# Patient Record
Sex: FEMALE | Race: WHITE | NOT HISPANIC OR LATINO | Employment: OTHER | URBAN - METROPOLITAN AREA
[De-identification: names, ages, dates, MRNs, and addresses within clinical notes are randomized per-mention and may not be internally consistent; named-entity substitution may affect disease eponyms.]

---

## 2017-11-27 RX ORDER — ATORVASTATIN CALCIUM 20 MG/1
TABLET, FILM COATED ORAL
Qty: 90 TABLET | Refills: 0 | Status: SHIPPED | OUTPATIENT
Start: 2017-11-27 | End: 2018-03-01 | Stop reason: SDUPTHER

## 2017-11-27 RX ORDER — METFORMIN HYDROCHLORIDE 500 MG/1
TABLET ORAL
Qty: 90 TABLET | Refills: 0 | Status: SHIPPED | OUTPATIENT
Start: 2017-11-27 | End: 2018-03-29 | Stop reason: SDUPTHER

## 2018-01-22 RX ORDER — ESCITALOPRAM OXALATE 20 MG/1
TABLET ORAL
Qty: 90 TABLET | Refills: 3 | Status: SHIPPED | OUTPATIENT
Start: 2018-01-22 | End: 2018-12-29 | Stop reason: SDUPTHER

## 2018-01-26 ENCOUNTER — OFFICE VISIT (OUTPATIENT)
Dept: PRIMARY CARE CLINIC | Facility: CLINIC | Age: 73
End: 2018-01-26
Payer: MEDICARE

## 2018-01-26 VITALS
BODY MASS INDEX: 27.42 KG/M2 | SYSTOLIC BLOOD PRESSURE: 122 MMHG | HEIGHT: 62 IN | WEIGHT: 149 LBS | RESPIRATION RATE: 16 BRPM | TEMPERATURE: 98 F | DIASTOLIC BLOOD PRESSURE: 71 MMHG | HEART RATE: 80 BPM | OXYGEN SATURATION: 98 %

## 2018-01-26 DIAGNOSIS — E78.5 HYPERLIPIDEMIA, UNSPECIFIED HYPERLIPIDEMIA TYPE: ICD-10-CM

## 2018-01-26 DIAGNOSIS — F41.9 ANXIETY: ICD-10-CM

## 2018-01-26 DIAGNOSIS — R41.3 MEMORY LOSS: ICD-10-CM

## 2018-01-26 DIAGNOSIS — R59.0 SUPRACLAVICULAR ADENOPATHY: Primary | ICD-10-CM

## 2018-01-26 DIAGNOSIS — E11.69 TYPE 2 DIABETES MELLITUS WITH HYPERLIPIDEMIA: ICD-10-CM

## 2018-01-26 DIAGNOSIS — E78.5 TYPE 2 DIABETES MELLITUS WITH HYPERLIPIDEMIA: ICD-10-CM

## 2018-01-26 PROCEDURE — 99214 OFFICE O/P EST MOD 30 MIN: CPT | Mod: S$GLB,,, | Performed by: FAMILY MEDICINE

## 2018-01-26 PROCEDURE — 99999 PR PBB SHADOW E&M-EST. PATIENT-LVL IV: CPT | Mod: PBBFAC,,, | Performed by: FAMILY MEDICINE

## 2018-01-26 NOTE — PROGRESS NOTES
"Subjective:       Patient ID: Melissa Sood is a 72 y.o. female.    Chief Complaint: Mass (Pt. c/o lump to right side of neck/chest x 2 days. Pt. denies pain and change in size. Mobile. No redness or fever noted. )    Discovered nontender lump to right side of neck 2 days ago. No recent illness. Has been generally feeling good. Last mammogram 9/27 in [place], normal.  Patient and family also concerned about progressively worsening memory loss. Has tried Aricept and Namzaric in the past, both of which she could not tolerate due to GI side effects. Saw neurologist in April 2014, felt to have no evidence of dementia at that time.      Review of Systems   Constitutional: Negative for chills, diaphoresis, fever and unexpected weight change.   HENT: Negative for sore throat and trouble swallowing.    Eyes: Negative for visual disturbance.   Respiratory: Negative for shortness of breath.    Cardiovascular: Negative for chest pain.   Gastrointestinal: Negative for diarrhea, nausea and vomiting.   Genitourinary: Negative for difficulty urinating.   Musculoskeletal: Negative for joint swelling.   Skin: Negative for rash.   Neurological: Negative for weakness.   Hematological: Does not bruise/bleed easily.   Psychiatric/Behavioral: Positive for sleep disturbance.       Objective:      Vitals:    01/26/18 1614   BP: 122/71   BP Location: Left arm   Patient Position: Sitting   BP Method: Medium (Automatic)   Pulse: 80   Resp: 16   Temp: 98 °F (36.7 °C)   TempSrc: Oral   SpO2: 98%   Weight: 67.6 kg (149 lb)   Height: 5' 2" (1.575 m)     Physical Exam   Constitutional: She is oriented to person, place, and time. She appears well-developed and well-nourished.   HENT:   Head: Normocephalic and atraumatic.   Neck: Neck supple. No JVD present.   Cardiovascular: Normal rate, regular rhythm and normal heart sounds.    Pulmonary/Chest: Effort normal and breath sounds normal.   Musculoskeletal: She exhibits no edema.   Lymphadenopathy:     "    Right: Supraclavicular (1cm, firm, mobile, nontender) adenopathy present.   Neurological: She is alert and oriented to person, place, and time.   Skin: Skin is warm and dry.   Psychiatric: She has a normal mood and affect. Her behavior is normal.   Nursing note and vitals reviewed.      Assessment:       1. Supraclavicular adenopathy    2. Type 2 diabetes mellitus with hyperlipidemia    3. Anxiety    4. Hyperlipidemia, unspecified hyperlipidemia type    5. Memory loss        Plan:       Supraclavicular adenopathy  -     CT Soft Tissue Neck With Contrast; Future; Expected date: 02/02/2018  -     CBC auto differential; Future; Expected date: 01/26/2018  -     TSH; Future; Expected date: 01/26/2018  -     T4, free; Future; Expected date: 01/26/2018  -     Sedimentation rate, manual; Future; Expected date: 01/26/2018  -     C-reactive protein; Future; Expected date: 01/26/2018    Type 2 diabetes mellitus with hyperlipidemia  Comments:  instructed needs to hold metformin for 24 before and 48 hours after CT scan with IV contrast  Orders:  -     Comprehensive metabolic panel; Future; Expected date: 01/26/2018  -     Hemoglobin A1c; Future; Expected date: 01/26/2018  -     Microalbumin/creatinine urine ratio; Future; Expected date: 01/26/2018  -     TSH; Future; Expected date: 01/26/2018  -     T4, free; Future; Expected date: 01/26/2018    Anxiety    Hyperlipidemia, unspecified hyperlipidemia type  -     Lipid panel; Future; Expected date: 01/26/2018    Memory loss  Comments:  I would like her to get a second opinion regarding her memory problems  Orders:  -     Ambulatory referral to Neurology      Medication List with Changes/Refills   Current Medications    ASPIRIN (ECOTRIN) 81 MG EC TABLET    Take 81 mg by mouth once daily.    ATORVASTATIN (LIPITOR) 20 MG TABLET    TAKE 1 TABLET BY MOUTH DAILY    ESCITALOPRAM OXALATE (LEXAPRO) 20 MG TABLET    TAKE 1 TABLET BY MOUTH EVERY DAY    METFORMIN (GLUCOPHAGE) 500 MG TABLET     TAKE 1 TABLET BY MOUTH DAILY WITH A MEAL

## 2018-01-30 ENCOUNTER — CLINICAL SUPPORT (OUTPATIENT)
Dept: PRIMARY CARE CLINIC | Facility: CLINIC | Age: 73
End: 2018-01-30
Payer: MEDICARE

## 2018-01-30 DIAGNOSIS — E78.5 TYPE 2 DIABETES MELLITUS WITH HYPERLIPIDEMIA: ICD-10-CM

## 2018-01-30 DIAGNOSIS — E78.5 HYPERLIPIDEMIA, UNSPECIFIED HYPERLIPIDEMIA TYPE: ICD-10-CM

## 2018-01-30 DIAGNOSIS — E11.69 TYPE 2 DIABETES MELLITUS WITH HYPERLIPIDEMIA: ICD-10-CM

## 2018-01-30 DIAGNOSIS — R59.0 SUPRACLAVICULAR ADENOPATHY: ICD-10-CM

## 2018-01-30 LAB
ALBUMIN SERPL BCP-MCNC: 3.6 G/DL
ALP SERPL-CCNC: 105 U/L
ALT SERPL W/O P-5'-P-CCNC: 12 U/L
ANION GAP SERPL CALC-SCNC: 8 MMOL/L
AST SERPL-CCNC: 24 U/L
BASOPHILS # BLD AUTO: 0.06 K/UL
BASOPHILS NFR BLD: 1.1 %
BILIRUB SERPL-MCNC: 0.7 MG/DL
BUN SERPL-MCNC: 14 MG/DL
CALCIUM SERPL-MCNC: 9.3 MG/DL
CHLORIDE SERPL-SCNC: 105 MMOL/L
CHOLEST SERPL-MCNC: 146 MG/DL
CHOLEST/HDLC SERPL: 2.9 {RATIO}
CO2 SERPL-SCNC: 27 MMOL/L
CREAT SERPL-MCNC: 0.8 MG/DL
CREAT UR-MCNC: 309 MG/DL
CRP SERPL-MCNC: 1.1 MG/L
DIFFERENTIAL METHOD: NORMAL
EOSINOPHIL # BLD AUTO: 0.2 K/UL
EOSINOPHIL NFR BLD: 3 %
ERYTHROCYTE [DISTWIDTH] IN BLOOD BY AUTOMATED COUNT: 13.1 %
ERYTHROCYTE [SEDIMENTATION RATE] IN BLOOD BY WESTERGREN METHOD: 28 MM/HR
EST. GFR  (AFRICAN AMERICAN): >60 ML/MIN/1.73 M^2
EST. GFR  (NON AFRICAN AMERICAN): >60 ML/MIN/1.73 M^2
ESTIMATED AVG GLUCOSE: 123 MG/DL
GLUCOSE SERPL-MCNC: 116 MG/DL
HBA1C MFR BLD HPLC: 5.9 %
HCT VFR BLD AUTO: 41.1 %
HDLC SERPL-MCNC: 51 MG/DL
HDLC SERPL: 34.9 %
HGB BLD-MCNC: 13.4 G/DL
IMM GRANULOCYTES # BLD AUTO: 0.01 K/UL
IMM GRANULOCYTES NFR BLD AUTO: 0.2 %
LDLC SERPL CALC-MCNC: 78.6 MG/DL
LYMPHOCYTES # BLD AUTO: 1.6 K/UL
LYMPHOCYTES NFR BLD: 28.4 %
MCH RBC QN AUTO: 30 PG
MCHC RBC AUTO-ENTMCNC: 32.6 G/DL
MCV RBC AUTO: 92 FL
MICROALBUMIN UR DL<=1MG/L-MCNC: 26 UG/ML
MICROALBUMIN/CREATININE RATIO: 8.4 UG/MG
MONOCYTES # BLD AUTO: 0.4 K/UL
MONOCYTES NFR BLD: 6.2 %
NEUTROPHILS # BLD AUTO: 3.5 K/UL
NEUTROPHILS NFR BLD: 61.1 %
NONHDLC SERPL-MCNC: 95 MG/DL
NRBC BLD-RTO: 0 /100 WBC
PLATELET # BLD AUTO: 236 K/UL
PMV BLD AUTO: 10 FL
POTASSIUM SERPL-SCNC: 5.2 MMOL/L
PROT SERPL-MCNC: 7.5 G/DL
RBC # BLD AUTO: 4.46 M/UL
SODIUM SERPL-SCNC: 140 MMOL/L
T4 FREE SERPL-MCNC: 1.16 NG/DL
TRIGL SERPL-MCNC: 82 MG/DL
TSH SERPL DL<=0.005 MIU/L-ACNC: 1.35 UIU/ML
WBC # BLD AUTO: 5.67 K/UL

## 2018-01-30 PROCEDURE — 84443 ASSAY THYROID STIM HORMONE: CPT

## 2018-01-30 PROCEDURE — 85651 RBC SED RATE NONAUTOMATED: CPT

## 2018-01-30 PROCEDURE — 99999 PR PBB SHADOW E&M-EST. PATIENT-LVL II: CPT | Mod: PBBFAC,,,

## 2018-01-30 PROCEDURE — 84439 ASSAY OF FREE THYROXINE: CPT

## 2018-01-30 PROCEDURE — 80053 COMPREHEN METABOLIC PANEL: CPT

## 2018-01-30 PROCEDURE — 82043 UR ALBUMIN QUANTITATIVE: CPT

## 2018-01-30 PROCEDURE — 83036 HEMOGLOBIN GLYCOSYLATED A1C: CPT

## 2018-01-30 PROCEDURE — 80061 LIPID PANEL: CPT

## 2018-01-30 PROCEDURE — 85025 COMPLETE CBC W/AUTO DIFF WBC: CPT

## 2018-01-30 PROCEDURE — 86140 C-REACTIVE PROTEIN: CPT

## 2018-02-06 ENCOUNTER — TELEPHONE (OUTPATIENT)
Dept: PRIMARY CARE CLINIC | Facility: CLINIC | Age: 73
End: 2018-02-06

## 2018-02-06 NOTE — TELEPHONE ENCOUNTER
Nurse called patient with results , labs normal but ct showed enlarged lymph nodes and we need to do another ct with contrast. RN instructed patient to hold metformin 1 day prior and two days after ct and to make appt with Dr Lopez 2-3 days after ct to discuss. Nurse further stated that [facility] will call tomorrow to set up date and time for ct. Patient acknowledged.

## 2018-02-06 NOTE — TELEPHONE ENCOUNTER
----- Message from Carla Bynum sent at 2/6/2018  4:41 PM CST -----  Contact: [phone] Britney Vazquez (Daughter)  [phone] Britney Vazquez (Daughter), requesting results

## 2018-02-07 ENCOUNTER — TELEPHONE (OUTPATIENT)
Dept: PRIMARY CARE CLINIC | Facility: CLINIC | Age: 73
End: 2018-02-07

## 2018-02-07 NOTE — TELEPHONE ENCOUNTER
----- Message from Natasha Roa sent at 2/7/2018  8:57 AM CST -----  Contact: Britney  Daughter called regarding the patient's ct scan and lab results. Please contact [phone]

## 2018-02-07 NOTE — TELEPHONE ENCOUNTER
Labs all look okay except minimally elevated sedimentation rate, which is a nonspecific marker of inflammation.  A1c 5.9%, excellent control diabetes.  Lipid profile looks great.  Continue current medications.

## 2018-02-09 ENCOUNTER — TELEPHONE (OUTPATIENT)
Dept: PRIMARY CARE CLINIC | Facility: CLINIC | Age: 73
End: 2018-02-09

## 2018-02-09 NOTE — TELEPHONE ENCOUNTER
----- Message from Carla Bynum sent at 2/9/2018 12:33 PM CST -----  Contact: [phone] Britney Vazquez (Daughter)  [phone] Britney Vazquez (Daughter), requesting results  Patient daughter is upset because nursing staff is calling the patient with the results when she left messages with her phone number to call her and not the patient, the patient have memory issues and don't understand the call, she don't understand why someone calling her, she don't understand results or anything.

## 2018-02-14 DIAGNOSIS — M89.9 LYTIC BONE LESIONS ON XRAY: Primary | ICD-10-CM

## 2018-02-14 DIAGNOSIS — C79.51 BONE METASTASES: ICD-10-CM

## 2018-02-14 DIAGNOSIS — D49.2 MUSCULOSKELETAL TUMOR: ICD-10-CM

## 2018-02-16 ENCOUNTER — TELEPHONE (OUTPATIENT)
Dept: PRIMARY CARE CLINIC | Facility: CLINIC | Age: 73
End: 2018-02-16

## 2018-02-16 NOTE — TELEPHONE ENCOUNTER
----- Message from Vicky Edge sent at 2/16/2018  8:37 AM CST -----  Contact: Daughter  Britney, daughter [phone], Calling to find out if her mother needs to see the Oncologist before she has the bone scan done. Please call her. Thanks.

## 2018-02-16 NOTE — TELEPHONE ENCOUNTER
Spoke with patient's daughter (maximilian), asking how long will it take to get the results back from the bone scan. Patient has it scheduled for Tuesday 2/20/18. Verbalized to daughter results should be back within a few days to make the F/U appointment with oncology for 1 week after scan. Daughter verbalized understanding.

## 2018-02-16 NOTE — TELEPHONE ENCOUNTER
Britney notified that the pt needs to do the bone scan before seeing the Oncologist. She states understanding but wants to talk to the nurse she spoke to last night

## 2018-02-28 ENCOUNTER — TELEPHONE (OUTPATIENT)
Dept: HEMATOLOGY/ONCOLOGY | Facility: CLINIC | Age: 73
End: 2018-02-28

## 2018-02-28 NOTE — TELEPHONE ENCOUNTER
----- Message from Kacie Medel MD sent at 2/27/2018  4:16 PM CST -----  Contact: Arcenio (Son) [phone]   Please get her in to see me   LM  And call son   ----- Message -----  From: RT Anjali  Sent: 2/23/2018   3:45 PM  To: Kacie Medel MD    Arcenio (Son) [phone], requesting an appt for the pt, thanks.

## 2018-03-01 RX ORDER — ATORVASTATIN CALCIUM 20 MG/1
TABLET, FILM COATED ORAL
Qty: 90 TABLET | Refills: 1 | Status: SHIPPED | OUTPATIENT
Start: 2018-03-01 | End: 2018-08-30 | Stop reason: SDUPTHER

## 2018-03-08 ENCOUNTER — TELEPHONE (OUTPATIENT)
Dept: PRIMARY CARE CLINIC | Facility: CLINIC | Age: 73
End: 2018-03-08

## 2018-03-08 DIAGNOSIS — F41.9 ANXIETY: Primary | ICD-10-CM

## 2018-03-08 RX ORDER — ALPRAZOLAM 0.25 MG/1
0.25 TABLET ORAL DAILY PRN
Qty: 30 TABLET | Refills: 1 | Status: SHIPPED | OUTPATIENT
Start: 2018-03-08 | End: 2018-12-17

## 2018-03-08 NOTE — TELEPHONE ENCOUNTER
Spoke to Britney and she states over last month patient has become more anxious.  The more stressed she gets the more she forgets things.  She said she has gotten lost twice in the last couple weeks.  She wants to know if her Lexapro can be increased.

## 2018-03-08 NOTE — TELEPHONE ENCOUNTER
----- Message from Luz Elena Goodman sent at 3/8/2018 11:58 AM CST -----  Contact: Isidra Tan [phone]  Please call her regarding her mother's anxiety.  She thinks the lexapro dose needs to be increased.  Thank you!

## 2018-03-08 NOTE — TELEPHONE ENCOUNTER
Already on max dose Lexapro.  Can try low-dose Xanax as needed, prescription sent to the pharmacy.  Make sure patient sees neurologist as advised in January.

## 2018-03-08 NOTE — TELEPHONE ENCOUNTER
Tried calling Britney, she did not answer and does not have a voicemail set up. I will try again later

## 2018-03-26 ENCOUNTER — OFFICE VISIT (OUTPATIENT)
Dept: HEMATOLOGY/ONCOLOGY | Facility: CLINIC | Age: 73
End: 2018-03-26
Payer: MEDICARE

## 2018-03-26 VITALS
TEMPERATURE: 98 F | RESPIRATION RATE: 18 BRPM | HEART RATE: 79 BPM | DIASTOLIC BLOOD PRESSURE: 61 MMHG | WEIGHT: 148.19 LBS | SYSTOLIC BLOOD PRESSURE: 132 MMHG | BODY MASS INDEX: 27.11 KG/M2

## 2018-03-26 DIAGNOSIS — Z85.3 PERSONAL HISTORY OF BREAST CANCER: ICD-10-CM

## 2018-03-26 DIAGNOSIS — R59.0 SUPRACLAVICULAR LYMPHADENOPATHY: ICD-10-CM

## 2018-03-26 PROCEDURE — 99214 OFFICE O/P EST MOD 30 MIN: CPT | Mod: ,,, | Performed by: INTERNAL MEDICINE

## 2018-03-26 NOTE — ASSESSMENT & PLAN NOTE
Patient has new lymphadenopathy which is palpable and easily visible on CT scan viewed with patient.  Need biopsy asap and will refer to general surgery for this.  Will also get tumor markers and a PET scan while we are doing this.  This was discussed in detail with patient and family.

## 2018-03-29 ENCOUNTER — OFFICE VISIT (OUTPATIENT)
Dept: SURGERY | Facility: CLINIC | Age: 73
End: 2018-03-29
Payer: MEDICARE

## 2018-03-29 VITALS
SYSTOLIC BLOOD PRESSURE: 132 MMHG | DIASTOLIC BLOOD PRESSURE: 61 MMHG | WEIGHT: 148 LBS | HEIGHT: 62 IN | BODY MASS INDEX: 27.23 KG/M2

## 2018-03-29 DIAGNOSIS — R59.0 MEDIASTINAL LYMPHADENOPATHY: ICD-10-CM

## 2018-03-29 DIAGNOSIS — R59.0 SUPRACLAVICULAR LYMPHADENOPATHY: Primary | ICD-10-CM

## 2018-03-29 DIAGNOSIS — Z85.3 PERSONAL HISTORY OF BREAST CANCER: ICD-10-CM

## 2018-03-29 LAB
BUN SERPL-MCNC: 15 MG/DL (ref 8–20)
CALCIUM SERPL-MCNC: 9.5 MG/DL (ref 7.7–10.4)
CEA: 4.2 NG/ML
CHLORIDE: 103 MMOL/L (ref 98–110)
CO2 SERPL-SCNC: 26.4 MMOL/L (ref 22.8–31.6)
CREATININE: 0.74 MG/DL (ref 0.6–1.4)
GLUCOSE: 118 MG/DL (ref 70–99)
HCT VFR BLD AUTO: 41.1 % (ref 36–48)
HGB BLD-MCNC: 13.5 G/DL (ref 12–15)
MCH RBC QN AUTO: 30.5 PG (ref 25–35)
MCHC RBC AUTO-ENTMCNC: 32.8 G/DL (ref 31–36)
MCV RBC AUTO: 93 FL (ref 79–98)
NUCLEATED RBCS: 0 %
PLATELET # BLD AUTO: 220 K/UL (ref 140–440)
POTASSIUM SERPL-SCNC: 4.9 MMOL/L (ref 3.5–5)
RBC # BLD AUTO: 4.42 M/UL (ref 3.5–5.5)
SODIUM: 138 MMOL/L (ref 134–144)
WBC # BLD AUTO: 5.6 K/UL (ref 5–10)

## 2018-03-29 PROCEDURE — 99203 OFFICE O/P NEW LOW 30 MIN: CPT | Mod: ,,, | Performed by: SURGERY

## 2018-03-29 RX ORDER — METFORMIN HYDROCHLORIDE 500 MG/1
TABLET ORAL
Qty: 90 TABLET | Refills: 0 | Status: SHIPPED | OUTPATIENT
Start: 2018-03-29 | End: 2018-04-25 | Stop reason: SDUPTHER

## 2018-03-29 RX ORDER — METFORMIN HYDROCHLORIDE 500 MG/1
TABLET ORAL
Qty: 90 TABLET | Refills: 0 | Status: SHIPPED | OUTPATIENT
Start: 2018-03-29 | End: 2019-04-08 | Stop reason: SDUPTHER

## 2018-03-29 NOTE — LETTER
March 29, 2018      Rudolph Barton MD  [address]  [address]  [place] [address]           [facility]  [address]  [address]  [place] [address]  Phone: [phone]  Fax: [phone]          Patient: Melissa Sood   MR Number: [id number]   YOB: 1945   Date of Visit: 3/29/2018       Dear Dr. Rudolph Barton:    Thank you for referring Melissa Sood to me for evaluation. Attached you will find relevant portions of my assessment and plan of care.    If you have questions, please do not hesitate to call me. I look forward to following Melissa Sood along with you.    Sincerely,    Aster Torres MD    Enclosure  CC:  No Recipients    If you would like to receive this communication electronically, please contact [email or web address] or [phone] to request more information on [employer] [facility] access.    For providers and/or their staff who would like to refer a patient to [facility], please contact us through our one-stop-shop provider referral line, [facility], at [phone].    If you feel you have received this communication in error or would no longer like to receive these types of communications, please e-mail [email or web address]

## 2018-03-29 NOTE — PROGRESS NOTES
Subjective:       Patient ID: Melissa Sood is a 72 y.o. female.    Chief Complaint: Other (Referred by  to [facility] for lymph node biopsy)      HPI:  Pt with hx of R BRCA in 2001, s/p Lump/ALND, s/p chemo/XRT.  C/o 6 wk hx of mass on right side of neck.    CT - enlarged right supraclavicular mediastinal,and right hilar lymph nodes    Referred for bx to establish dx.      Past Medical History:   Diagnosis Date    Anxiety     Diabetes mellitus, type 2     History of breast cancer     Hyperlipidemia      Past Surgical History:   Procedure Laterality Date    BREAST LUMPECTOMY Right 2001    HYSTERECTOMY       Review of patient's allergies indicates:   Allergen Reactions    Codeine      Medication List with Changes/Refills   Current Medications    ALPRAZOLAM (XANAX) 0.25 MG TABLET    Take 1 tablet (0.25 mg total) by mouth daily as needed for Anxiety.    ASPIRIN (ECOTRIN) 81 MG EC TABLET    Take 81 mg by mouth once daily.    ATORVASTATIN (LIPITOR) 20 MG TABLET    TAKE 1 TABLET BY MOUTH ONCE DAILY    ESCITALOPRAM OXALATE (LEXAPRO) 20 MG TABLET    TAKE 1 TABLET BY MOUTH EVERY DAY    METFORMIN (GLUCOPHAGE) 500 MG TABLET    TAKE 1 TABLET BY MOUTH DAILY WITH A MEAL     Family History   Problem Relation Age of Onset    Heart disease Mother     Heart disease Father     Cancer Brother      breast     Social History     Social History    Marital status:      Spouse name: N/A    Number of children: N/A    Years of education: N/A     Occupational History    retired resturant manager       Social History Main Topics    Smoking status: Never Smoker    Smokeless tobacco: Never Used    Alcohol use 0.6 oz/week     1 Glasses of wine per week      Comment: a glass per week    Drug use: No    Sexual activity: Not Currently     Partners: Male     Birth control/ protection: None     Other Topics Concern    None     Social History Narrative    None         Review of Systems   Constitutional: Negative for  appetite change, chills, fever and unexpected weight change.   HENT: Negative for hearing loss, rhinorrhea, sore throat and voice change.    Eyes: Negative for photophobia and visual disturbance.   Respiratory: Negative for cough, choking and shortness of breath.    Cardiovascular: Negative for chest pain, palpitations and leg swelling.   Gastrointestinal: Negative for abdominal pain, blood in stool, constipation, diarrhea, nausea and vomiting.   Endocrine: Negative for cold intolerance, heat intolerance, polydipsia and polyuria.   Musculoskeletal: Negative for arthralgias, back pain, joint swelling and neck stiffness.   Skin: Negative for color change, pallor and rash.   Neurological: Negative for dizziness, seizures, syncope and headaches.   Hematological: Negative for adenopathy. Does not bruise/bleed easily.   Psychiatric/Behavioral: Negative for agitation, behavioral problems and confusion.       Objective:      Physical Exam   Constitutional: She appears well-developed and well-nourished.  Non-toxic appearance. No distress.   HENT:   Head: Normocephalic and atraumatic. Head is without abrasion and without laceration.   Right Ear: External ear normal.   Left Ear: External ear normal.   Nose: Nose normal.   Mouth/Throat: Oropharynx is clear and moist.   Eyes: EOM are normal. Pupils are equal, round, and reactive to light.   Neck: Trachea normal. No tracheal deviation and normal range of motion present. No thyroid mass and no thyromegaly present.   Cardiovascular: Normal rate and regular rhythm.    Pulmonary/Chest: Effort normal. No accessory muscle usage. No tachypnea. No respiratory distress.   Abdominal: Soft. Normal appearance and bowel sounds are normal. She exhibits no distension and no mass. There is no hepatosplenomegaly. There is no tenderness. There is no tenderness at McBurney's point and negative Jaquez's sign. No hernia.   Lymphadenopathy:     She has cervical adenopathy.        Right cervical:  Superficial cervical, deep cervical and posterior cervical adenopathy present.     She has no axillary adenopathy.   Neurological: She is alert. Coordination and gait normal.   Skin: Skin is warm and intact.   Psychiatric: She has a normal mood and affect. Her speech is normal and behavior is normal.       Assessment/Plan:   Supraclavicular lymphadenopathy  -     Ambulatory Referral to External Surgery  -     Basic metabolic panel; Future; Expected date: 03/29/2018  -     CBC Without Differential; Future; Expected date: 03/29/2018  -     X-Ray Chest PA And Lateral    Personal history of breast cancer    Mediastinal lymphadenopathy        Planned procedure: Right supraclavicular lymph node biopsy    Ancef 2 gm IV on call to OR    NPO past midnight    Scott cloth scrub per protocol    SCDs Bilateral Lower Extremities    I discussed the proposed procedures the the patient including risks, benefits, indications, alternatives and special concerns.  The patient appears to understand and agrees to go ahead with surgery.  I have made no promises, warranties or verbal agreements beyond what was discussed above.    No Follow-up on file.

## 2018-03-30 LAB
CA 27.29: 287.1 U/ML (ref 0–38.6)
CANCER AG15-3 SERPL-ACNC: 240.7 U/ML (ref 0–25)

## 2018-04-02 LAB — GLUCOSE SERPL-MCNC: 112 MG/DL (ref 70–99)

## 2018-04-04 LAB — FLOW CYTOMETRY ANTIBODIES ANALYZED: NORMAL

## 2018-04-09 LAB — GLUCOSE SERPL-MCNC: 116 MG/DL (ref 70–99)

## 2018-04-11 ENCOUNTER — OFFICE VISIT (OUTPATIENT)
Dept: HEMATOLOGY/ONCOLOGY | Facility: CLINIC | Age: 73
End: 2018-04-11
Payer: MEDICARE

## 2018-04-11 VITALS
WEIGHT: 146.5 LBS | SYSTOLIC BLOOD PRESSURE: 134 MMHG | DIASTOLIC BLOOD PRESSURE: 72 MMHG | BODY MASS INDEX: 26.8 KG/M2 | RESPIRATION RATE: 18 BRPM | TEMPERATURE: 98 F | HEART RATE: 80 BPM

## 2018-04-11 DIAGNOSIS — Z85.3 PERSONAL HISTORY OF BREAST CANCER: Primary | ICD-10-CM

## 2018-04-11 DIAGNOSIS — C50.911 RECURRENT MALIGNANT NEOPLASM OF RIGHT BREAST: Chronic | ICD-10-CM

## 2018-04-11 DIAGNOSIS — Z85.3 PERSONAL HISTORY OF BREAST CANCER: ICD-10-CM

## 2018-04-11 DIAGNOSIS — C50.911 RECURRENT MALIGNANT NEOPLASM OF RIGHT BREAST: ICD-10-CM

## 2018-04-11 PROCEDURE — 99215 OFFICE O/P EST HI 40 MIN: CPT | Mod: ,,, | Performed by: INTERNAL MEDICINE

## 2018-04-11 RX ORDER — EXEMESTANE 25 MG/1
25 TABLET ORAL DAILY
COMMUNITY
End: 2018-07-29 | Stop reason: SDUPTHER

## 2018-04-11 RX ORDER — HYDROCODONE BITARTRATE AND ACETAMINOPHEN 5; 325 MG/1; MG/1
TABLET ORAL
COMMUNITY
Start: 2018-04-02 | End: 2019-06-24

## 2018-04-11 NOTE — LETTER
April 11, 2018      Cameron Lopez MD  [address]  [address]  [facility] [address]           [facility]  [address]  [address]  [facility] [address]  Phone: [phone]  Fax: [phone]          Patient: Melissa Sood   MR Number: [id number]   YOB: 1945   Date of Visit: 4/11/2018       Dear Dr. Cameron Lopez:    Thank you for referring Melissa Sood to me for evaluation. Attached you will find relevant portions of my assessment and plan of care.    If you have questions, please do not hesitate to call me. I look forward to following Melissa Sood along with you.    Sincerely,    Rudolph Barton MD    Enclosure  CC:  No Recipients    If you would like to receive this communication electronically, please contact [email or web address] or [phone] to request more information on [employer] Link access.    For providers and/or their staff who would like to refer a patient to [facility], please contact us through our one-stop-shop provider referral line, [facility], at [phone].    If you feel you have received this communication in error or would no longer like to receive these types of communications, please e-mail [email or web address]

## 2018-04-11 NOTE — ASSESSMENT & PLAN NOTE
Biopsy is positive for adenocarcinoma.  Final path is pending and I will add ER/WI and her 2 receptors to this.  I discussed the options today with the assumption this will be ER pos/Her 2 negative.  In this situation I feel the use of Ibrance/exemestane is the best approach to begin.  I reviewed the risks, benefits and side effects of these medications. Will also arrange chemo education.  I also discussed the life threatening and terminal/non-curable nature of this disease.  My hope is that we can control this cancer into the foreseeable future with the use of oral and iv cancer medications.  Will prescribe the Exemestane today but will wait on Ibrance until the receptors are known.

## 2018-04-11 NOTE — PROGRESS NOTES
PROGRESS NOTE    Subjective:       Patient ID: Melissa Sood is a 72 y.o. female.    Breast Cancer:  Dx: 2003  T2N1M0 stage II  neoadj AC-T, surgery-XRT Arimidex-Femara till 2/2011  BRCA 1/2 neg  ER pos, Her 2 neg    Chief Complaint:  Follow-up and Results  breast cancer follow up.      History of Present Illness:   Melissa Sood is a 72 y.o. female who presents for follow up after 1.5 years.  Patient was found to have abnormal bone scan done.         Family and Social history reviewed and is unchanged from 9/22/2014      ROS:  Review of Systems   Constitutional: Negative for fever.   Respiratory: Negative for shortness of breath.    Cardiovascular: Negative for chest pain and leg swelling.   Gastrointestinal: Negative for abdominal pain and blood in stool.   Genitourinary: Negative for hematuria.   Skin: Negative for rash.          Current Outpatient Prescriptions:     ALPRAZolam (XANAX) 0.25 MG tablet, Take 1 tablet (0.25 mg total) by mouth daily as needed for Anxiety., Disp: 30 tablet, Rfl: 1    aspirin (ECOTRIN) 81 MG EC tablet, Take 81 mg by mouth once daily., Disp: , Rfl:     atorvastatin (LIPITOR) 20 MG tablet, TAKE 1 TABLET BY MOUTH ONCE DAILY, Disp: 90 tablet, Rfl: 1    escitalopram oxalate (LEXAPRO) 20 MG tablet, TAKE 1 TABLET BY MOUTH EVERY DAY, Disp: 90 tablet, Rfl: 3    hydrocodone-acetaminophen 5-325mg (NORCO) 5-325 mg per tablet, , Disp: , Rfl:     metFORMIN (GLUCOPHAGE) 500 MG tablet, TAKE 1 TABLET BY MOUTH ONCE DAILY WITH A MEAL, Disp: 90 tablet, Rfl: 0    metFORMIN (GLUCOPHAGE) 500 MG tablet, TAKE 1 TABLET BY MOUTH ONCE DAILY WITH A MEAL, Disp: 90 tablet, Rfl: 0    exemestane (AROMASIN) 25 mg tablet, Take 25 mg by mouth once daily., Disp: , Rfl:         Objective:       Physical Examination:     /72   Pulse 80   Temp 97.6 °F (36.4 °C)   Resp 18   Wt 66.5 kg (146 lb 8 oz)   BMI 26.80 kg/m²     Physical Exam   Constitutional:  She appears well-developed and well-nourished.   HENT:   Head: Normocephalic and atraumatic.   Right Ear: External ear normal.   Left Ear: External ear normal.   Mouth/Throat: Oropharynx is clear and moist.   Eyes: Conjunctivae are normal. Pupils are equal, round, and reactive to light.   Neck: No tracheal deviation present. No thyromegaly present.   Cardiovascular: Normal rate, regular rhythm and normal heart sounds.    Pulmonary/Chest: Effort normal and breath sounds normal.   Abdominal: Soft. Bowel sounds are normal. She exhibits no distension and no mass. There is no tenderness.   Musculoskeletal: She exhibits no edema.   Neurological:   Neuro intact througout   Skin: No rash noted.   Psychiatric: She has a normal mood and affect. Her behavior is normal. Judgment and thought content normal.       Labs:   No results found for this or any previous visit (from the past 336 hour(s)).  CMP  Sodium   Date Value Ref Range Status   03/29/2018 138 134 - 144 mmol/L      Potassium   Date Value Ref Range Status   03/29/2018 4.9 3.5 - 5.0 mmol/L      Chloride   Date Value Ref Range Status   03/29/2018 103 98 - 110 mmol/L      CO2   Date Value Ref Range Status   03/29/2018 26.4 22.8 - 31.6 mmol/L      Glucose   Date Value Ref Range Status   03/29/2018 118 (H) 70 - 99 mg/dL      BUN, Bld   Date Value Ref Range Status   03/29/2018 15 8 - 20 mg/dL      Creatinine   Date Value Ref Range Status   03/29/2018 0.74 0.60 - 1.40 mg/dL      Calcium   Date Value Ref Range Status   03/29/2018 9.5 7.7 - 10.4 mg/dL      Total Protein   Date Value Ref Range Status   01/30/2018 7.5 6.0 - 8.4 g/dL Final     Albumin   Date Value Ref Range Status   01/30/2018 3.6 3.5 - 5.2 g/dL Final     Total Bilirubin   Date Value Ref Range Status   01/30/2018 0.7 0.1 - 1.0 mg/dL Final     Comment:     For infants and newborns, interpretation of results should be based  on gestational age, weight and in agreement with clinical  observations.  Premature Infant  recommended reference ranges:  Up to 24 hours.............<8.0 mg/dL  Up to 48 hours............<12.0 mg/dL  3-5 days..................<15.0 mg/dL  6-29 days.................<15.0 mg/dL       Alkaline Phosphatase   Date Value Ref Range Status   01/30/2018 105 55 - 135 U/L Final     AST   Date Value Ref Range Status   01/30/2018 24 10 - 40 U/L Final     ALT   Date Value Ref Range Status   01/30/2018 12 10 - 44 U/L Final     Anion Gap   Date Value Ref Range Status   01/30/2018 8 8 - 16 mmol/L Final     eGFR if    Date Value Ref Range Status   01/30/2018 >60.0 >60 mL/min/1.73 m^2 Final     eGFR if non    Date Value Ref Range Status   01/30/2018 >60.0 >60 mL/min/1.73 m^2 Final     Comment:     Calculation used to obtain the estimated glomerular filtration  rate (eGFR) is the CKD-EPI equation.        Lab Results   Component Value Date    CEA 4.2 03/29/2018     No results found for: PSA        Assessment/Plan:     Problem List Items Addressed This Visit     Recurrent malignant neoplasm of right breast (Chronic)     Biopsy is positive for adenocarcinoma.  Final path is pending and I will add ER/SD and her 2 receptors to this.  I discussed the options today with the assumption this will be ER pos/Her 2 negative.  In this situation I feel the use of Ibrance/exemestane is the best approach to begin.  I reviewed the risks, benefits and side effects of these medications. Will also arrange chemo education.  I also discussed the life threatening and terminal/non-curable nature of this disease.  My hope is that we can control this cancer into the foreseeable future with the use of oral and iv cancer medications.  Will prescribe the Exemestane today but will wait on Ibrance until the receptors are known.           Personal history of breast cancer          Discussion:     Follow-up in about 3 weeks (around 5/2/2018).      Electronically signed by Rudolph Bryan

## 2018-04-12 RX ORDER — EXEMESTANE 25 MG/1
25 TABLET ORAL DAILY
Qty: 30 TABLET | Refills: 2 | Status: CANCELLED | OUTPATIENT
Start: 2018-04-12 | End: 2019-04-12

## 2018-04-16 ENCOUNTER — PATIENT MESSAGE (OUTPATIENT)
Dept: SURGERY | Facility: CLINIC | Age: 73
End: 2018-04-16

## 2018-04-19 ENCOUNTER — TELEPHONE (OUTPATIENT)
Dept: HEMATOLOGY/ONCOLOGY | Facility: CLINIC | Age: 73
End: 2018-04-19

## 2018-04-19 DIAGNOSIS — Z17.0 MALIGNANT NEOPLASM OF UPPER-OUTER QUADRANT OF RIGHT BREAST IN FEMALE, ESTROGEN RECEPTOR POSITIVE: Primary | ICD-10-CM

## 2018-04-19 DIAGNOSIS — C50.411 MALIGNANT NEOPLASM OF UPPER-OUTER QUADRANT OF RIGHT BREAST IN FEMALE, ESTROGEN RECEPTOR POSITIVE: Primary | ICD-10-CM

## 2018-04-19 NOTE — TELEPHONE ENCOUNTER
----- Message from Aster Ortez sent at 4/19/2018 11:12 AM CDT -----  Britney patient's daughter called in stating that the patient was in to see Dr. Barton on 4/11. She said that he prescribed her a medication that was going to be mailed to them (she did not know the name of the medication). She also said that she was told the insurance company would be calling her to find out some more information. She has not heard from anyone regarding this. Please advise and contact Britney at [phone]. Thanks       Contacted Britney and she said the the aromasin was picked up at a local pharmacy but so far they have not heard from the speciality pharmacy about delivery of Ibrance. I told her I will check on it.

## 2018-04-24 ENCOUNTER — TELEPHONE (OUTPATIENT)
Dept: HEMATOLOGY/ONCOLOGY | Facility: CLINIC | Age: 73
End: 2018-04-24

## 2018-04-24 NOTE — TELEPHONE ENCOUNTER
Spoke to daughter and let her know Ibrance has been approved. Waiting on pharmacy to process and send to patient.

## 2018-04-24 NOTE — TELEPHONE ENCOUNTER
Spoke to Britney. I let her know that the Ibrance has been approved. [facility] will call her to set up delivery

## 2018-04-25 ENCOUNTER — OFFICE VISIT (OUTPATIENT)
Dept: HEMATOLOGY/ONCOLOGY | Facility: CLINIC | Age: 73
End: 2018-04-25
Payer: MEDICARE

## 2018-04-25 VITALS
SYSTOLIC BLOOD PRESSURE: 133 MMHG | HEIGHT: 62 IN | TEMPERATURE: 98 F | WEIGHT: 141.5 LBS | BODY MASS INDEX: 26.04 KG/M2 | HEART RATE: 75 BPM | DIASTOLIC BLOOD PRESSURE: 67 MMHG

## 2018-04-25 DIAGNOSIS — C50.911 RECURRENT MALIGNANT NEOPLASM OF RIGHT BREAST: Chronic | ICD-10-CM

## 2018-04-25 PROCEDURE — 99214 OFFICE O/P EST MOD 30 MIN: CPT | Mod: ,,, | Performed by: INTERNAL MEDICINE

## 2018-04-25 NOTE — ASSESSMENT & PLAN NOTE
Patient is doing well on exemestane.  Ibrance is on order and will begin this when it arrives.  I reviewed the PET findings which are consistent with mets in the bones of the spine, pelvis and right supraclav/MS areas.  Discussed this in detail with the patient and family.  Will have her back in six weeks.

## 2018-04-25 NOTE — LETTER
April 25, 2018      Cameron Lopez MD  [address]  [address]  [facility] [address]           [facility]  [address]  [address]  [facility] [address]  Phone: [phone]  Fax: [phone]          Patient: Melissa Sood   MR Number: [id number]   YOB: 1945   Date of Visit: 4/25/2018       Dear Dr. Cameron Lopez:    Thank you for referring Melissa Sood to me for evaluation. Attached you will find relevant portions of my assessment and plan of care.    If you have questions, please do not hesitate to call me. I look forward to following Melissa Sood along with you.    Sincerely,    Rudolph Barton MD    Enclosure  CC:  No Recipients    If you would like to receive this communication electronically, please contact [email or web address] or [phone] to request more information on [employer] Link access.    For providers and/or their staff who would like to refer a patient to [facility], please contact us through our one-stop-shop provider referral line, [facility] [place], at [phone].    If you feel you have received this communication in error or would no longer like to receive these types of communications, please e-mail [email or web address]

## 2018-04-25 NOTE — PROGRESS NOTES
"                                                         PROGRESS NOTE    Subjective:       Patient ID: Melissa Sood is a 72 y.o. female.    Breast Cancer:  Dx: 2003  T2N1M0 stage II  neoadj AC-T, surgery-XRT Arimidex-Femara till 2/2011  BRCA 1/2 neg  ER pos, Her 2 neg    Chief Complaint:  Results and Follow-up  breast cancer follow up.      History of Present Illness:   Melissa Sood is a 72 y.o. female who presents for follow up after 1.5 years.  Patient is doing well.  Started exemestane but not yet Ibrance.       Family and Social history reviewed and is unchanged from 9/22/2014      ROS:  Review of Systems   Constitutional: Negative for fever.   Respiratory: Negative for shortness of breath.    Cardiovascular: Negative for chest pain and leg swelling.   Gastrointestinal: Negative for abdominal pain and blood in stool.   Genitourinary: Negative for hematuria.   Skin: Negative for rash.          Current Outpatient Prescriptions:     ALPRAZolam (XANAX) 0.25 MG tablet, Take 1 tablet (0.25 mg total) by mouth daily as needed for Anxiety., Disp: 30 tablet, Rfl: 1    aspirin (ECOTRIN) 81 MG EC tablet, Take 81 mg by mouth once daily., Disp: , Rfl:     atorvastatin (LIPITOR) 20 MG tablet, TAKE 1 TABLET BY MOUTH ONCE DAILY, Disp: 90 tablet, Rfl: 1    escitalopram oxalate (LEXAPRO) 20 MG tablet, TAKE 1 TABLET BY MOUTH EVERY DAY, Disp: 90 tablet, Rfl: 3    exemestane (AROMASIN) 25 mg tablet, Take 25 mg by mouth once daily., Disp: , Rfl:     hydrocodone-acetaminophen 5-325mg (NORCO) 5-325 mg per tablet, , Disp: , Rfl:     metFORMIN (GLUCOPHAGE) 500 MG tablet, TAKE 1 TABLET BY MOUTH ONCE DAILY WITH A MEAL, Disp: 90 tablet, Rfl: 0    palbociclib 125 mg Cap, Take 125 mg by mouth once daily. Take one tablet daily for 21 days then off for 7 days., Disp: 21 capsule, Rfl: 6        Objective:       Physical Examination:     /67   Pulse 75   Temp 98.2 °F (36.8 °C)   Ht 5' 2" (1.575 m)   Wt 64.2 kg (141 lb 8 oz)   " BMI 25.88 kg/m²     Physical Exam   Constitutional: She appears well-developed and well-nourished.   HENT:   Head: Normocephalic and atraumatic.   Right Ear: External ear normal.   Left Ear: External ear normal.   Mouth/Throat: Oropharynx is clear and moist.   Eyes: Conjunctivae are normal. Pupils are equal, round, and reactive to light.   Neck: No tracheal deviation present. No thyromegaly present.   Cardiovascular: Normal rate, regular rhythm and normal heart sounds.    Pulmonary/Chest: Effort normal and breath sounds normal.   Abdominal: Soft. Bowel sounds are normal. She exhibits no distension and no mass. There is no tenderness.   Musculoskeletal: She exhibits no edema.   Neurological:   Neuro intact througout   Skin: No rash noted.   Psychiatric: She has a normal mood and affect. Her behavior is normal. Judgment and thought content normal.       Labs:   No results found for this or any previous visit (from the past 336 hour(s)).  CMP  Sodium   Date Value Ref Range Status   03/29/2018 138 134 - 144 mmol/L      Potassium   Date Value Ref Range Status   03/29/2018 4.9 3.5 - 5.0 mmol/L      Chloride   Date Value Ref Range Status   03/29/2018 103 98 - 110 mmol/L      CO2   Date Value Ref Range Status   03/29/2018 26.4 22.8 - 31.6 mmol/L      Glucose   Date Value Ref Range Status   03/29/2018 118 (H) 70 - 99 mg/dL      BUN, Bld   Date Value Ref Range Status   03/29/2018 15 8 - 20 mg/dL      Creatinine   Date Value Ref Range Status   03/29/2018 0.74 0.60 - 1.40 mg/dL      Calcium   Date Value Ref Range Status   03/29/2018 9.5 7.7 - 10.4 mg/dL      Total Protein   Date Value Ref Range Status   01/30/2018 7.5 6.0 - 8.4 g/dL Final     Albumin   Date Value Ref Range Status   01/30/2018 3.6 3.5 - 5.2 g/dL Final     Total Bilirubin   Date Value Ref Range Status   01/30/2018 0.7 0.1 - 1.0 mg/dL Final     Comment:     For infants and newborns, interpretation of results should be based  on gestational age, weight and in  agreement with clinical  observations.  Premature Infant recommended reference ranges:  Up to 24 hours.............<8.0 mg/dL  Up to 48 hours............<12.0 mg/dL  3-5 days..................<15.0 mg/dL  6-29 days.................<15.0 mg/dL       Alkaline Phosphatase   Date Value Ref Range Status   01/30/2018 105 55 - 135 U/L Final     AST   Date Value Ref Range Status   01/30/2018 24 10 - 40 U/L Final     ALT   Date Value Ref Range Status   01/30/2018 12 10 - 44 U/L Final     Anion Gap   Date Value Ref Range Status   01/30/2018 8 8 - 16 mmol/L Final     eGFR if    Date Value Ref Range Status   01/30/2018 >60.0 >60 mL/min/1.73 m^2 Final     eGFR if non    Date Value Ref Range Status   01/30/2018 >60.0 >60 mL/min/1.73 m^2 Final     Comment:     Calculation used to obtain the estimated glomerular filtration  rate (eGFR) is the CKD-EPI equation.        Lab Results   Component Value Date    CEA 4.2 03/29/2018     No results found for: PSA        Assessment/Plan:     Problem List Items Addressed This Visit     Recurrent malignant neoplasm of right breast (Chronic)     Patient is doing well on exemestane.  Ibrance is on order and will begin this when it arrives.  I reviewed the PET findings which are consistent with mets in the bones of the spine, pelvis and right supraclav/MS areas.  Discussed this in detail with the patient and family.  Will have her back in six weeks.                 Discussion:     Follow-up in about 6 weeks (around 6/6/2018).      Electronically signed by Rudolph Bryan

## 2018-05-01 ENCOUNTER — TELEPHONE (OUTPATIENT)
Dept: HEMATOLOGY/ONCOLOGY | Facility: CLINIC | Age: 73
End: 2018-05-01

## 2018-05-01 NOTE — TELEPHONE ENCOUNTER
Spoke to [facility] today. The Ibrance shipped out yesterday 4/30/18 for delivery today 5/1/18

## 2018-06-06 ENCOUNTER — OFFICE VISIT (OUTPATIENT)
Dept: HEMATOLOGY/ONCOLOGY | Facility: CLINIC | Age: 73
End: 2018-06-06
Payer: MEDICARE

## 2018-06-06 VITALS
SYSTOLIC BLOOD PRESSURE: 130 MMHG | BODY MASS INDEX: 26.65 KG/M2 | HEART RATE: 69 BPM | DIASTOLIC BLOOD PRESSURE: 64 MMHG | WEIGHT: 144.81 LBS | TEMPERATURE: 98 F | HEIGHT: 62 IN

## 2018-06-06 DIAGNOSIS — F41.9 ANXIETY: ICD-10-CM

## 2018-06-06 DIAGNOSIS — C50.911 RECURRENT MALIGNANT NEOPLASM OF RIGHT BREAST: Chronic | ICD-10-CM

## 2018-06-06 LAB
ALBUMIN SERPL-MCNC: 4.4 G/DL (ref 3.1–4.7)
ALP SERPL-CCNC: 138 IU/L (ref 40–104)
ALT (SGPT): 28 IU/L (ref 3–33)
AST SERPL-CCNC: 26 IU/L (ref 10–40)
BASOPHILS NFR BLD: 0.1 K/UL (ref 0–0.2)
BASOPHILS NFR BLD: 3 %
BILIRUB SERPL-MCNC: 0.5 MG/DL (ref 0.3–1)
BUN SERPL-MCNC: 15 MG/DL (ref 8–20)
CALCIUM SERPL-MCNC: 9.5 MG/DL (ref 7.7–10.4)
CHLORIDE: 104 MMOL/L (ref 98–110)
CO2 SERPL-SCNC: 26.1 MMOL/L (ref 22.8–31.6)
CREATININE: 0.79 MG/DL (ref 0.6–1.4)
EOSINOPHIL NFR BLD: 0 K/UL (ref 0–0.7)
EOSINOPHIL NFR BLD: 1.8 %
ERYTHROCYTE [DISTWIDTH] IN BLOOD BY AUTOMATED COUNT: 15.9 % (ref 12.5–14.5)
GLUCOSE: 111 MG/DL (ref 70–99)
GRAN #: 1.1 K/UL (ref 1.4–6.5)
GRAN%: 49.1 %
HCT VFR BLD AUTO: 35.2 % (ref 36–48)
HGB BLD-MCNC: 11.6 G/DL (ref 12–15)
IMMATURE GRANS (ABS): 0 K/UL (ref 0–1)
IMMATURE GRANULOCYTES: 0.5 %
LYMPH #: 0.9 K/UL (ref 1.2–3.4)
LYMPH%: 39.6 %
MCH RBC QN AUTO: 31.1 PG (ref 25–35)
MCHC RBC AUTO-ENTMCNC: 33 G/DL (ref 31–36)
MCV RBC AUTO: 94.4 FL (ref 79–98)
MONO #: 0.1 K/UL (ref 0.1–0.6)
MONO%: 5.4 %
NUCLEATED RBCS: 0 %
NUCLEATED RED BLOOD CELLS: 0 /100 WBC
PERFORMED BY:: ABNORMAL
PLATELET # BLD AUTO: 221 K/UL (ref 140–440)
PMV BLD AUTO: 8.7 FL (ref 8.8–12.7)
POTASSIUM SERPL-SCNC: 4.4 MMOL/L (ref 3.5–5)
PROT SERPL-MCNC: 7.7 G/DL (ref 6–8.2)
RBC # BLD AUTO: 3.73 M/UL (ref 3.5–5.5)
SODIUM: 138 MMOL/L (ref 134–144)
WBC # BLD: 2.2 K/UL (ref 5–10)

## 2018-06-06 PROCEDURE — 99214 OFFICE O/P EST MOD 30 MIN: CPT | Mod: ,,, | Performed by: INTERNAL MEDICINE

## 2018-06-06 NOTE — LETTER
June 6, 2018      Cameron Lopez MD  [address]  [address]  [facility] [address]           [facility] - Hematology On[facility]  [address]  [address]  [facility] [address]  Phone: [phone]  Fax: [phone]          Patient: Melissa Sood   MR Number: [id number]   YOB: 1945   Date of Visit: 6/6/2018       Dear Dr. Cameron Lopez:    Thank you for referring Melissa Sood to me for evaluation. Attached you will find relevant portions of my assessment and plan of care.    If you have questions, please do not hesitate to call me. I look forward to following Melissa Sood along with you.    Sincerely,    Rudolph Barton MD    Enclosure  CC:  No Recipients    If you would like to receive this communication electronically, please contact [email or web address] or [phone] to request more information on [employer] Link access.    For providers and/or their staff who would like to refer a patient to [facility], please contact us through our one-stop-shop provider referral line, [facility], at [phone].    If you feel you have received this communication in error or would no longer like to receive these types of communications, please e-mail [email or web address]

## 2018-06-06 NOTE — ASSESSMENT & PLAN NOTE
Patient is doing well on Ibrance and exemestane.  Will continue to treat her with this regimen as she is tolerating this well. Need labs today and discussed this.  Will see her again in 6 weeks at which time we will likely plan repeat scanning.     Neck exam is improved.  LAD feels decreased!!!

## 2018-06-06 NOTE — PROGRESS NOTES
"                                                         PROGRESS NOTE    Subjective:       Patient ID: Melissa Sood is a 72 y.o. female.    Breast Cancer:  Dx: 2003  T2N1M0 stage II  neoadj AC-T, surgery-XRT Arimidex-Femara till 2/2011  BRCA 1/2 neg  ER pos, Her 2 neg  Recurrence 3/2018  Started Ibrance/Exemestane 4/30/2018.     Chief Complaint:  Follow-up and Results  breast cancer follow up.      History of Present Illness:   Melissa Sood is a 72 y.o. female who presents for follow up after 1.5 years.  Patient is doing well.  No on Ibrance and exemestane since 4/30/2018.  Tolerating this well.        Family and Social history reviewed and is unchanged from 9/22/2014      ROS:  Review of Systems   Constitutional: Negative for fever.   Respiratory: Negative for shortness of breath.    Cardiovascular: Negative for chest pain and leg swelling.   Gastrointestinal: Negative for abdominal pain and blood in stool.   Genitourinary: Negative for hematuria.   Skin: Negative for rash.          Current Outpatient Prescriptions:     ALPRAZolam (XANAX) 0.25 MG tablet, Take 1 tablet (0.25 mg total) by mouth daily as needed for Anxiety., Disp: 30 tablet, Rfl: 1    aspirin (ECOTRIN) 81 MG EC tablet, Take 81 mg by mouth once daily., Disp: , Rfl:     atorvastatin (LIPITOR) 20 MG tablet, TAKE 1 TABLET BY MOUTH ONCE DAILY, Disp: 90 tablet, Rfl: 1    escitalopram oxalate (LEXAPRO) 20 MG tablet, TAKE 1 TABLET BY MOUTH EVERY DAY, Disp: 90 tablet, Rfl: 3    exemestane (AROMASIN) 25 mg tablet, Take 25 mg by mouth once daily., Disp: , Rfl:     hydrocodone-acetaminophen 5-325mg (NORCO) 5-325 mg per tablet, , Disp: , Rfl:     metFORMIN (GLUCOPHAGE) 500 MG tablet, TAKE 1 TABLET BY MOUTH ONCE DAILY WITH A MEAL, Disp: 90 tablet, Rfl: 0        Objective:       Physical Examination:     /64   Pulse 69   Temp 98.3 °F (36.8 °C)   Ht 5' 2" (1.575 m)   Wt 65.7 kg (144 lb 12.8 oz)   BMI 26.48 kg/m²     Physical Exam   Constitutional: " She appears well-developed and well-nourished.   HENT:   Head: Normocephalic and atraumatic.   Right Ear: External ear normal.   Left Ear: External ear normal.   Mouth/Throat: Oropharynx is clear and moist.   Eyes: Conjunctivae are normal. Pupils are equal, round, and reactive to light.   Neck: No tracheal deviation present. No thyromegaly present.   Cardiovascular: Normal rate, regular rhythm and normal heart sounds.    Pulmonary/Chest: Effort normal and breath sounds normal.   Abdominal: Soft. Bowel sounds are normal. She exhibits no distension and no mass. There is no tenderness.   Musculoskeletal: She exhibits no edema.   Neurological:   Neuro intact througout   Skin: No rash noted.   Psychiatric: She has a normal mood and affect. Her behavior is normal. Judgment and thought content normal.       Labs:   No results found for this or any previous visit (from the past 336 hour(s)).  CMP  Sodium   Date Value Ref Range Status   03/29/2018 138 134 - 144 mmol/L      Potassium   Date Value Ref Range Status   03/29/2018 4.9 3.5 - 5.0 mmol/L      Chloride   Date Value Ref Range Status   03/29/2018 103 98 - 110 mmol/L      CO2   Date Value Ref Range Status   03/29/2018 26.4 22.8 - 31.6 mmol/L      Glucose   Date Value Ref Range Status   03/29/2018 118 (H) 70 - 99 mg/dL      BUN, Bld   Date Value Ref Range Status   03/29/2018 15 8 - 20 mg/dL      Creatinine   Date Value Ref Range Status   03/29/2018 0.74 0.60 - 1.40 mg/dL      Calcium   Date Value Ref Range Status   03/29/2018 9.5 7.7 - 10.4 mg/dL      Total Protein   Date Value Ref Range Status   01/30/2018 7.5 6.0 - 8.4 g/dL Final     Albumin   Date Value Ref Range Status   01/30/2018 3.6 3.5 - 5.2 g/dL Final     Total Bilirubin   Date Value Ref Range Status   01/30/2018 0.7 0.1 - 1.0 mg/dL Final     Comment:     For infants and newborns, interpretation of results should be based  on gestational age, weight and in agreement with clinical  observations.  Premature Infant  recommended reference ranges:  Up to 24 hours.............<8.0 mg/dL  Up to 48 hours............<12.0 mg/dL  3-5 days..................<15.0 mg/dL  6-29 days.................<15.0 mg/dL       Alkaline Phosphatase   Date Value Ref Range Status   01/30/2018 105 55 - 135 U/L Final     AST   Date Value Ref Range Status   01/30/2018 24 10 - 40 U/L Final     ALT   Date Value Ref Range Status   01/30/2018 12 10 - 44 U/L Final     Anion Gap   Date Value Ref Range Status   01/30/2018 8 8 - 16 mmol/L Final     eGFR if    Date Value Ref Range Status   01/30/2018 >60.0 >60 mL/min/1.73 m^2 Final     eGFR if non    Date Value Ref Range Status   01/30/2018 >60.0 >60 mL/min/1.73 m^2 Final     Comment:     Calculation used to obtain the estimated glomerular filtration  rate (eGFR) is the CKD-EPI equation.        Lab Results   Component Value Date    CEA 4.2 03/29/2018     No results found for: PSA        Assessment/Plan:     Problem List Items Addressed This Visit     Recurrent malignant neoplasm of right breast (Chronic)     Patient is doing well on Ibrance and exemestane.  Will continue to treat her with this regimen as she is tolerating this well. Need labs today and discussed this.  Will see her again in 6 weeks at which time we will likely plan repeat scanning.     Neck exam is improved.  LAD feels decreased!!!         Relevant Orders    CBC auto differential    Comprehensive metabolic panel    Cancer antigen 15-3    Cancer antigen 27.29    Anxiety     Patient doing well. Continue current care.                 Discussion:   High complexity, high risk medications.   Follow-up in about 6 weeks (around 7/18/2018).      Electronically signed by Rudolph Bryan

## 2018-06-07 LAB
CA 27.29: 123.7 U/ML (ref 0–38.6)
CANCER AG15-3 SERPL-ACNC: 112.3 U/ML (ref 0–25)

## 2018-06-12 ENCOUNTER — TELEPHONE (OUTPATIENT)
Dept: HEMATOLOGY/ONCOLOGY | Facility: CLINIC | Age: 73
End: 2018-06-12

## 2018-06-12 NOTE — TELEPHONE ENCOUNTER
----- Message from Aster Ortez sent at 6/12/2018  2:59 PM CDT -----  Patient's daughter Britney called in requesting nurse please call her with BW results done last Wednesday next door in Infusion. Please advise and contact Britney at [phone]. Thanks       Spoke to Britney. Labs look good. The cancer antigens are much better than they were 2 months ago. Advised to continue present medication.

## 2018-06-18 RX ORDER — METFORMIN HYDROCHLORIDE 500 MG/1
TABLET ORAL
Qty: 90 TABLET | Refills: 1 | Status: SHIPPED | OUTPATIENT
Start: 2018-06-18 | End: 2018-12-10 | Stop reason: SDUPTHER

## 2018-07-19 ENCOUNTER — OFFICE VISIT (OUTPATIENT)
Dept: HEMATOLOGY/ONCOLOGY | Facility: CLINIC | Age: 73
End: 2018-07-19
Payer: MEDICARE

## 2018-07-19 VITALS
BODY MASS INDEX: 26.54 KG/M2 | HEART RATE: 67 BPM | TEMPERATURE: 99 F | DIASTOLIC BLOOD PRESSURE: 75 MMHG | WEIGHT: 145.13 LBS | SYSTOLIC BLOOD PRESSURE: 123 MMHG | RESPIRATION RATE: 18 BRPM

## 2018-07-19 DIAGNOSIS — R94.8 ABNORMAL PET SCAN OF MEDIASTINUM: Primary | ICD-10-CM

## 2018-07-19 DIAGNOSIS — C50.911 RECURRENT MALIGNANT NEOPLASM OF RIGHT BREAST: Chronic | ICD-10-CM

## 2018-07-19 DIAGNOSIS — F41.9 ANXIETY: ICD-10-CM

## 2018-07-19 LAB
ALBUMIN SERPL-MCNC: 4.5 G/DL (ref 3.1–4.7)
ALP SERPL-CCNC: 84 IU/L (ref 40–104)
ALT (SGPT): 32 IU/L (ref 3–33)
AST SERPL-CCNC: 31 IU/L (ref 10–40)
BASOPHILS NFR BLD: 0.1 K/UL (ref 0–0.2)
BASOPHILS NFR BLD: 1.7 %
BILIRUB SERPL-MCNC: 0.6 MG/DL (ref 0.3–1)
BUN SERPL-MCNC: 15 MG/DL (ref 8–20)
CALCIUM SERPL-MCNC: 9.3 MG/DL (ref 7.7–10.4)
CEA: 1.8 NG/ML
CHLORIDE: 103 MMOL/L (ref 98–110)
CO2 SERPL-SCNC: 27.2 MMOL/L (ref 22.8–31.6)
CREATININE: 0.71 MG/DL (ref 0.6–1.4)
EOSINOPHIL NFR BLD: 0 K/UL (ref 0–0.7)
EOSINOPHIL NFR BLD: 1 %
ERYTHROCYTE [DISTWIDTH] IN BLOOD BY AUTOMATED COUNT: 19.3 % (ref 12.5–14.5)
GLUCOSE: 83 MG/DL (ref 70–99)
GRAN #: 1.3 K/UL (ref 1.4–6.5)
GRAN%: 45.6 %
HCT VFR BLD AUTO: 33.9 % (ref 36–48)
HGB BLD-MCNC: 11.3 G/DL (ref 12–15)
IMMATURE GRANS (ABS): 0 K/UL (ref 0–1)
IMMATURE GRANULOCYTES: 0.3 %
LYMPH #: 1.1 K/UL (ref 1.2–3.4)
LYMPH%: 37.6 %
MCH RBC QN AUTO: 34.2 PG (ref 25–35)
MCHC RBC AUTO-ENTMCNC: 33.3 G/DL (ref 31–36)
MCV RBC AUTO: 102.7 FL (ref 79–98)
MONO #: 0.4 K/UL (ref 0.1–0.6)
MONO%: 13.8 %
NUCLEATED RBCS: 0 %
NUCLEATED RED BLOOD CELLS: 0 /100 WBC
PERFORMED BY:: ABNORMAL
PLATELET # BLD AUTO: 107 K/UL (ref 140–440)
PMV BLD AUTO: 8.5 FL (ref 8.8–12.7)
POTASSIUM SERPL-SCNC: 4.3 MMOL/L (ref 3.5–5)
PROT SERPL-MCNC: 7.5 G/DL (ref 6–8.2)
RBC # BLD AUTO: 3.3 M/UL (ref 3.5–5.5)
SODIUM: 138 MMOL/L (ref 134–144)
WBC # BLD: 2.9 K/UL (ref 5–10)

## 2018-07-19 PROCEDURE — 99214 OFFICE O/P EST MOD 30 MIN: CPT | Mod: ,,, | Performed by: INTERNAL MEDICINE

## 2018-07-19 NOTE — PROGRESS NOTES
PROGRESS NOTE    Subjective:       Patient ID: Melissa Sood is a 72 y.o. female.    Breast Cancer:  Dx: 2003  T2N1M0 stage II  neoadj AC-T, surgery-XRT Arimidex-Femara till 2/2011  BRCA 1/2 neg  ER pos, Her 2 neg  Recurrence 3/2018  Started Ibrance/Exemestane 4/30/2018.     Chief Complaint:  Follow-up and Results  breast cancer follow up.      History of Present Illness:   Melissa Sood is a 72 y.o. female who presents for follow up after 1.5 years.  Patient is doing well.  No on Ibrance and exemestane since 4/30/2018.  No new complaints at this time.  Seems to be tolerating the Ibrance and exe well.        Family and Social history reviewed and is unchanged from 9/22/2014      ROS:  Review of Systems   Constitutional: Negative for fever.   Respiratory: Negative for shortness of breath.    Cardiovascular: Negative for chest pain and leg swelling.   Gastrointestinal: Negative for abdominal pain and blood in stool.   Genitourinary: Negative for hematuria.   Skin: Negative for rash.          Current Outpatient Prescriptions:     ALPRAZolam (XANAX) 0.25 MG tablet, Take 1 tablet (0.25 mg total) by mouth daily as needed for Anxiety., Disp: 30 tablet, Rfl: 1    aspirin (ECOTRIN) 81 MG EC tablet, Take 81 mg by mouth once daily., Disp: , Rfl:     atorvastatin (LIPITOR) 20 MG tablet, TAKE 1 TABLET BY MOUTH ONCE DAILY, Disp: 90 tablet, Rfl: 1    escitalopram oxalate (LEXAPRO) 20 MG tablet, TAKE 1 TABLET BY MOUTH EVERY DAY, Disp: 90 tablet, Rfl: 3    exemestane (AROMASIN) 25 mg tablet, Take 25 mg by mouth once daily., Disp: , Rfl:     hydrocodone-acetaminophen 5-325mg (NORCO) 5-325 mg per tablet, , Disp: , Rfl:     metFORMIN (GLUCOPHAGE) 500 MG tablet, TAKE 1 TABLET BY MOUTH ONCE DAILY WITH A MEAL, Disp: 90 tablet, Rfl: 0    metFORMIN (GLUCOPHAGE) 500 MG tablet, TAKE 1 TABLET BY MOUTH ONCE DAILY WITH A MEAL, Disp: 90 tablet, Rfl: 1        Objective:       Physical  Examination:     /75   Pulse 67   Temp 99 °F (37.2 °C)   Resp 18   Wt 65.8 kg (145 lb 1.6 oz)   BMI 26.54 kg/m²     Physical Exam   Constitutional: She appears well-developed and well-nourished.   HENT:   Head: Normocephalic and atraumatic.   Right Ear: External ear normal.   Left Ear: External ear normal.   Mouth/Throat: Oropharynx is clear and moist.   Eyes: Conjunctivae are normal. Pupils are equal, round, and reactive to light.   Neck: No tracheal deviation present. No thyromegaly present.   Cardiovascular: Normal rate, regular rhythm and normal heart sounds.    Pulmonary/Chest: Effort normal and breath sounds normal.   Abdominal: Soft. Bowel sounds are normal. She exhibits no distension and no mass. There is no tenderness.   Musculoskeletal: She exhibits no edema.   Neurological:   Neuro intact througout   Skin: No rash noted.   Psychiatric: She has a normal mood and affect. Her behavior is normal. Judgment and thought content normal.       Labs:   No results found for this or any previous visit (from the past 336 hour(s)).  CMP  Sodium   Date Value Ref Range Status   06/06/2018 138 134 - 144 mmol/L      Potassium   Date Value Ref Range Status   06/06/2018 4.4 3.5 - 5.0 mmol/L      Chloride   Date Value Ref Range Status   06/06/2018 104 98 - 110 mmol/L      CO2   Date Value Ref Range Status   06/06/2018 26.1 22.8 - 31.6 mmol/L      Glucose   Date Value Ref Range Status   06/06/2018 111 (H) 70 - 99 mg/dL      BUN, Bld   Date Value Ref Range Status   06/06/2018 15 8 - 20 mg/dL      Creatinine   Date Value Ref Range Status   06/06/2018 0.79 0.60 - 1.40 mg/dL      Calcium   Date Value Ref Range Status   06/06/2018 9.5 7.7 - 10.4 mg/dL      Total Protein   Date Value Ref Range Status   06/06/2018 7.7 6.0 - 8.2 g/dL      Albumin   Date Value Ref Range Status   06/06/2018 4.4 3.1 - 4.7 g/dL      Total Bilirubin   Date Value Ref Range Status   06/06/2018 0.5 0.3 - 1.0 mg/dL      Alkaline Phosphatase   Date  Value Ref Range Status   06/06/2018 138 (H) 40 - 104 IU/L      AST   Date Value Ref Range Status   06/06/2018 26 10 - 40 IU/L      ALT   Date Value Ref Range Status   01/30/2018 12 10 - 44 U/L Final     Anion Gap   Date Value Ref Range Status   01/30/2018 8 8 - 16 mmol/L Final     eGFR if    Date Value Ref Range Status   01/30/2018 >60.0 >60 mL/min/1.73 m^2 Final     eGFR if non    Date Value Ref Range Status   01/30/2018 >60.0 >60 mL/min/1.73 m^2 Final     Comment:     Calculation used to obtain the estimated glomerular filtration  rate (eGFR) is the CKD-EPI equation.        Lab Results   Component Value Date    CEA 4.2 03/29/2018     No results found for: PSA        Assessment/Plan:     Problem List Items Addressed This Visit     Recurrent malignant neoplasm of right breast (Chronic)     Patient is tolerating these medications well and is due for scanning in the next four weeks. Will arrange a PET as she had several active LNs on last scan from April of this year.  Continue treatment and I will arrange this.          Relevant Orders    CBC auto differential    Comprehensive metabolic panel    Cancer antigen 27.29    Cancer antigen 15-3    CEA    Anxiety     Patient is doing well.  Will continue xanax which seems to be helping.            Other Visit Diagnoses     Abnormal PET scan of mediastinum    -  Primary          Discussion:   High complexity, high risk medications.   Follow-up in about 4 weeks (around 8/16/2018).      Electronically signed by Rudolph Bryan

## 2018-07-19 NOTE — ASSESSMENT & PLAN NOTE
Patient is tolerating these medications well and is due for scanning in the next four weeks. Will arrange a PET as she had several active LNs on last scan from April of this year.  Continue treatment and I will arrange this.

## 2018-07-19 NOTE — LETTER
July 19, 2018      Cameron Lopez MD  [address]  [address]  [facility] [address]           [facility] - Hematology Oncology  [address]  [address]  [facility] [address]  Phone: [phone]  Fax: [phone]          Patient: Melissa Sood   MR Number: [id number]   YOB: 1945   Date of Visit: 7/19/2018       Dear Dr. Cameron Lopez:    Thank you for referring Melissa Sood to me for evaluation. Attached you will find relevant portions of my assessment and plan of care.    If you have questions, please do not hesitate to call me. I look forward to following Melissa Sood along with you.    Sincerely,    Rudolph Barton MD    Enclosure  CC:  No Recipients    If you would like to receive this communication electronically, please contact [email or web address] or [phone] to request more information on [employer] Link access.    For providers and/or their staff who would like to refer a patient to [facility], please contact us through our one-stop-shop provider referral line, [facility], at [phone].    If you feel you have received this communication in error or would no longer like to receive these types of communications, please e-mail [email or web address]

## 2018-07-20 LAB
CA 27.29: 73.2 U/ML (ref 0–38.6)
CANCER AG15-3 SERPL-ACNC: 68.9 U/ML (ref 0–25)

## 2018-07-29 DIAGNOSIS — Z85.3 PERSONAL HISTORY OF MALIGNANT NEOPLASM OF BREAST: Primary | ICD-10-CM

## 2018-07-31 RX ORDER — EXEMESTANE 25 MG/1
TABLET ORAL
Qty: 30 TABLET | Refills: 0 | Status: SHIPPED | OUTPATIENT
Start: 2018-07-31 | End: 2018-08-30 | Stop reason: SDUPTHER

## 2018-07-31 RX ORDER — EXEMESTANE 25 MG/1
TABLET ORAL
Qty: 30 TABLET | Refills: 0 | Status: SHIPPED | OUTPATIENT
Start: 2018-07-31 | End: 2018-07-31

## 2018-08-30 DIAGNOSIS — Z85.3 PERSONAL HISTORY OF MALIGNANT NEOPLASM OF BREAST: ICD-10-CM

## 2018-08-30 RX ORDER — ATORVASTATIN CALCIUM 20 MG/1
TABLET, FILM COATED ORAL
Qty: 90 TABLET | Refills: 1 | Status: SHIPPED | OUTPATIENT
Start: 2018-08-30 | End: 2019-02-18 | Stop reason: SDUPTHER

## 2018-09-05 RX ORDER — EXEMESTANE 25 MG/1
TABLET ORAL
Qty: 30 TABLET | Refills: 0 | Status: SHIPPED | OUTPATIENT
Start: 2018-09-05 | End: 2019-04-05

## 2018-09-06 DIAGNOSIS — Z85.3 PERSONAL HISTORY OF MALIGNANT NEOPLASM OF BREAST: ICD-10-CM

## 2018-09-06 DIAGNOSIS — R94.8 ABNORMAL POSITRON EMISSION TOMOGRAPHY (PET) OF MEDIASTINUM: Primary | ICD-10-CM

## 2018-09-17 ENCOUNTER — OFFICE VISIT (OUTPATIENT)
Dept: HEMATOLOGY/ONCOLOGY | Facility: CLINIC | Age: 73
End: 2018-09-17
Payer: MEDICARE

## 2018-09-17 VITALS
BODY MASS INDEX: 26.37 KG/M2 | SYSTOLIC BLOOD PRESSURE: 112 MMHG | HEART RATE: 76 BPM | RESPIRATION RATE: 18 BRPM | WEIGHT: 144.19 LBS | TEMPERATURE: 98 F | DIASTOLIC BLOOD PRESSURE: 68 MMHG

## 2018-09-17 DIAGNOSIS — C50.911 RECURRENT MALIGNANT NEOPLASM OF RIGHT BREAST: Chronic | ICD-10-CM

## 2018-09-17 DIAGNOSIS — E11.69 TYPE 2 DIABETES MELLITUS WITH HYPERLIPIDEMIA: ICD-10-CM

## 2018-09-17 DIAGNOSIS — E78.5 TYPE 2 DIABETES MELLITUS WITH HYPERLIPIDEMIA: ICD-10-CM

## 2018-09-17 DIAGNOSIS — R59.0 SUPRACLAVICULAR LYMPHADENOPATHY: ICD-10-CM

## 2018-09-17 LAB
ALBUMIN SERPL-MCNC: 4 G/DL (ref 3.1–4.7)
ALP SERPL-CCNC: 79 IU/L (ref 40–104)
ALT (SGPT): 27 IU/L (ref 3–33)
AST SERPL-CCNC: 27 IU/L (ref 10–40)
BASOPHILS NFR BLD: 0.1 K/UL (ref 0–0.2)
BASOPHILS NFR BLD: 2.6 %
BILIRUB SERPL-MCNC: 0.7 MG/DL (ref 0.3–1)
BUN SERPL-MCNC: 16 MG/DL (ref 8–20)
CALCIUM SERPL-MCNC: 9.4 MG/DL (ref 7.7–10.4)
CHLORIDE: 104 MMOL/L (ref 98–110)
CO2 SERPL-SCNC: 29.6 MMOL/L (ref 22.8–31.6)
CREATININE: 0.87 MG/DL (ref 0.6–1.4)
EOSINOPHIL NFR BLD: 0.1 K/UL (ref 0–0.7)
EOSINOPHIL NFR BLD: 2.6 %
ERYTHROCYTE [DISTWIDTH] IN BLOOD BY AUTOMATED COUNT: 12.9 % (ref 12.5–14.5)
GLUCOSE: 95 MG/DL (ref 70–99)
GRAN #: 1.5 K/UL (ref 1.4–6.5)
GRAN%: 55.4 %
HCT VFR BLD AUTO: 33.3 % (ref 36–48)
HGB BLD-MCNC: 11.4 G/DL (ref 12–15)
IMMATURE GRANS (ABS): 0 K/UL (ref 0–1)
IMMATURE GRANULOCYTES: 0.4 %
LYMPH #: 0.9 K/UL (ref 1.2–3.4)
LYMPH%: 32.4 %
MCH RBC QN AUTO: 37.3 PG (ref 25–35)
MCHC RBC AUTO-ENTMCNC: 34.2 G/DL (ref 31–36)
MCV RBC AUTO: 108.8 FL (ref 79–98)
MONO #: 0.2 K/UL (ref 0.1–0.6)
MONO%: 6.6 %
NUCLEATED RBCS: 0 %
NUCLEATED RED BLOOD CELLS: 0 /100 WBC
PERFORMED BY:: ABNORMAL
PLATELET # BLD AUTO: 157 K/UL (ref 140–440)
PMV BLD AUTO: 8.7 FL (ref 8.8–12.7)
POTASSIUM SERPL-SCNC: 4.2 MMOL/L (ref 3.5–5)
PROT SERPL-MCNC: 7.5 G/DL (ref 6–8.2)
RBC # BLD AUTO: 3.06 M/UL (ref 3.5–5.5)
SODIUM: 141 MMOL/L (ref 134–144)
WBC # BLD: 2.7 K/UL (ref 5–10)

## 2018-09-17 PROCEDURE — 99214 OFFICE O/P EST MOD 30 MIN: CPT | Mod: ,,, | Performed by: INTERNAL MEDICINE

## 2018-09-17 PROCEDURE — 3044F HG A1C LEVEL LT 7.0%: CPT | Mod: ,,, | Performed by: INTERNAL MEDICINE

## 2018-09-17 PROCEDURE — 1101F PT FALLS ASSESS-DOCD LE1/YR: CPT | Mod: ,,, | Performed by: INTERNAL MEDICINE

## 2018-09-17 NOTE — LETTER
September 17, 2018      Cameron Lopez MD  [address]  [address]  [facility] [address]           [facility] - Hematology Oncology  [address]  [address]  [facility] [address]  Phone: [phone]  Fax: [phone]          Patient: Melissa Sood   MR Number: [id number]   YOB: 1945   Date of Visit: 9/17/2018       Dear Dr. Cameron Lopez:    Thank you for referring Melissa Sood to me for evaluation. Attached you will find relevant portions of my assessment and plan of care.    If you have questions, please do not hesitate to call me. I look forward to following Melissa Sood along with you.    Sincerely,    Rudolph Barton MD    Enclosure  CC:  No Recipients    If you would like to receive this communication electronically, please contact [email or web address] or [phone] to request more information on [employer] Link access.    For providers and/or their staff who would like to refer a patient to [facility], please contact us through our one-stop-shop provider referral line, [facility], at [phone].    If you feel you have received this communication in error or would no longer like to receive these types of communications, please e-mail [email or web address]

## 2018-09-17 NOTE — ASSESSMENT & PLAN NOTE
Sugars look good and this does not seem to be impacted by the cancer treatments.  Continue to watch.

## 2018-09-17 NOTE — ASSESSMENT & PLAN NOTE
Patient continues to do well on Ibrance and Exemestane and tolerating this well.  Labs look ok and tumor markers have improved significantly. PET scan looks great and there has been resolution of the LAD.   Will continue for as long as they are working and patient is tolerating them.

## 2018-09-17 NOTE — PROGRESS NOTES
PROGRESS NOTE    Subjective:       Patient ID: Melissa Sood is a 73 y.o. female.    Breast Cancer:  Dx: 2003  T2N1M0 stage II  neoadj AC-T, surgery-XRT Arimidex-Femara till 2/2011  BRCA 1/2 neg  ER pos, Her 2 neg  Recurrence 3/2018  Started Ibrance/Exemestane 4/30/2018.     Chief Complaint:  Follow-up and Results  breast cancer follow up.      History of Present Illness:   Melissa Sood is a 73 y.o. female who presents for follow up after 1.5 years.  Patient is doing well.  No on Ibrance and exemestane since 4/30/2018.  No new complaints at this time.  Seems to be tolerating the Ibrance and exe well.        Family and Social history reviewed and is unchanged from 9/22/2014      ROS:  Review of Systems   Constitutional: Negative for fever.   Respiratory: Negative for shortness of breath.    Cardiovascular: Negative for chest pain and leg swelling.   Gastrointestinal: Negative for abdominal pain and blood in stool.   Genitourinary: Negative for hematuria.   Skin: Negative for rash.          Current Outpatient Medications:     ALPRAZolam (XANAX) 0.25 MG tablet, Take 1 tablet (0.25 mg total) by mouth daily as needed for Anxiety., Disp: 30 tablet, Rfl: 1    aspirin (ECOTRIN) 81 MG EC tablet, Take 81 mg by mouth once daily., Disp: , Rfl:     atorvastatin (LIPITOR) 20 MG tablet, TAKE 1 TABLET BY MOUTH ONCE DAILY, Disp: 90 tablet, Rfl: 1    escitalopram oxalate (LEXAPRO) 20 MG tablet, TAKE 1 TABLET BY MOUTH EVERY DAY, Disp: 90 tablet, Rfl: 3    exemestane (AROMASIN) 25 mg tablet, TAKE 1 TABLET BY MOUTH EVERY DAY, Disp: 30 tablet, Rfl: 0    hydrocodone-acetaminophen 5-325mg (NORCO) 5-325 mg per tablet, , Disp: , Rfl:     metFORMIN (GLUCOPHAGE) 500 MG tablet, TAKE 1 TABLET BY MOUTH ONCE DAILY WITH A MEAL, Disp: 90 tablet, Rfl: 0    metFORMIN (GLUCOPHAGE) 500 MG tablet, TAKE 1 TABLET BY MOUTH ONCE DAILY WITH A MEAL, Disp: 90 tablet, Rfl: 1        Objective:        Physical Examination:     /68   Pulse 76   Temp 98 °F (36.7 °C)   Resp 18   Wt 65.4 kg (144 lb 3.2 oz)   BMI 26.37 kg/m²     Physical Exam   Constitutional: She appears well-developed and well-nourished.   HENT:   Head: Normocephalic and atraumatic.   Right Ear: External ear normal.   Left Ear: External ear normal.   Mouth/Throat: Oropharynx is clear and moist.   Eyes: Conjunctivae are normal. Pupils are equal, round, and reactive to light.   Neck: No tracheal deviation present. No thyromegaly present.   Cardiovascular: Normal rate, regular rhythm and normal heart sounds.   Pulmonary/Chest: Effort normal and breath sounds normal.   Abdominal: Soft. Bowel sounds are normal. She exhibits no distension and no mass. There is no tenderness.   Musculoskeletal: She exhibits no edema.   Neurological:   Neuro intact througout   Skin: No rash noted.   Psychiatric: She has a normal mood and affect. Her behavior is normal. Judgment and thought content normal.       Labs:   No results found for this or any previous visit (from the past 336 hour(s)).  CMP  Sodium   Date Value Ref Range Status   07/19/2018 138 134 - 144 mmol/L      Potassium   Date Value Ref Range Status   07/19/2018 4.3 3.5 - 5.0 mmol/L      Chloride   Date Value Ref Range Status   07/19/2018 103 98 - 110 mmol/L      CO2   Date Value Ref Range Status   07/19/2018 27.2 22.8 - 31.6 mmol/L      Glucose   Date Value Ref Range Status   07/19/2018 83 70 - 99 mg/dL      BUN, Bld   Date Value Ref Range Status   07/19/2018 15 8 - 20 mg/dL      Creatinine   Date Value Ref Range Status   07/19/2018 0.71 0.60 - 1.40 mg/dL      Calcium   Date Value Ref Range Status   07/19/2018 9.3 7.7 - 10.4 mg/dL      Total Protein   Date Value Ref Range Status   07/19/2018 7.5 6.0 - 8.2 g/dL      Albumin   Date Value Ref Range Status   07/19/2018 4.5 3.1 - 4.7 g/dL      Total Bilirubin   Date Value Ref Range Status   07/19/2018 0.6 0.3 - 1.0 mg/dL      Alkaline Phosphatase    Date Value Ref Range Status   07/19/2018 84 40 - 104 IU/L      AST   Date Value Ref Range Status   07/19/2018 31 10 - 40 IU/L      ALT   Date Value Ref Range Status   01/30/2018 12 10 - 44 U/L Final     Anion Gap   Date Value Ref Range Status   01/30/2018 8 8 - 16 mmol/L Final     eGFR if    Date Value Ref Range Status   01/30/2018 >60.0 >60 mL/min/1.73 m^2 Final     eGFR if non    Date Value Ref Range Status   01/30/2018 >60.0 >60 mL/min/1.73 m^2 Final     Comment:     Calculation used to obtain the estimated glomerular filtration  rate (eGFR) is the CKD-EPI equation.        Lab Results   Component Value Date    CEA 1.8 07/19/2018     No results found for: PSA        Assessment/Plan:     Problem List Items Addressed This Visit     Recurrent malignant neoplasm of right breast (Chronic)     Patient continues to do well on Ibrance and Exemestane and tolerating this well.  Labs look ok and tumor markers have improved significantly. PET scan looks great and there has been resolution of the LAD.   Will continue for as long as they are working and patient is tolerating them.           Relevant Orders    CBC auto differential    Comprehensive metabolic panel    Cancer antigen 15-3    Cancer antigen 27.29    Supraclavicular lymphadenopathy     This has resolved on PET scan.  Will continue treatment.          Type 2 diabetes mellitus with hyperlipidemia     Sugars look good and this does not seem to be impacted by the cancer treatments.  Continue to watch.                 Discussion:   High complexity, high risk medications.   Follow-up in about 3 months (around 12/17/2018).      Electronically signed by Rudolph Bryan

## 2018-09-18 LAB
CA 27.29: 69.8 U/ML (ref 0–38.6)
CANCER AG15-3 SERPL-ACNC: 55.6 U/ML (ref 0–25)

## 2018-09-27 DIAGNOSIS — Z85.3 PERSONAL HISTORY OF MALIGNANT NEOPLASM OF BREAST: ICD-10-CM

## 2018-09-28 RX ORDER — EXEMESTANE 25 MG/1
TABLET ORAL
Qty: 30 TABLET | Refills: 0 | Status: SHIPPED | OUTPATIENT
Start: 2018-09-28 | End: 2018-10-25 | Stop reason: SDUPTHER

## 2018-10-03 NOTE — TELEPHONE ENCOUNTER
----- Message from Aster Ortez sent at 10/3/2018 12:29 PM CDT -----  Patient's daughter Britney called in requesting nurse please contact her with patient's BW results from a couple of weeks ago done in [facility]. Please advise and contact Britney at [phone]. Thanks       Spoke to Britney with the lab results.

## 2018-10-13 DIAGNOSIS — Z85.3 HISTORY OF BREAST CANCER: Primary | ICD-10-CM

## 2018-10-17 RX ORDER — PALBOCICLIB 125 MG/1
CAPSULE ORAL
Qty: 21 CAPSULE | Refills: 5 | Status: SHIPPED | OUTPATIENT
Start: 2018-10-17 | End: 2020-06-12

## 2018-10-25 DIAGNOSIS — Z85.3 PERSONAL HISTORY OF MALIGNANT NEOPLASM OF BREAST: ICD-10-CM

## 2018-10-25 RX ORDER — EXEMESTANE 25 MG/1
TABLET ORAL
Qty: 30 TABLET | Refills: 0 | Status: SHIPPED | OUTPATIENT
Start: 2018-10-25 | End: 2018-11-20 | Stop reason: SDUPTHER

## 2018-11-20 DIAGNOSIS — Z85.3 PERSONAL HISTORY OF MALIGNANT NEOPLASM OF BREAST: ICD-10-CM

## 2018-11-25 RX ORDER — EXEMESTANE 25 MG/1
TABLET ORAL
Qty: 30 TABLET | Refills: 0 | Status: SHIPPED | OUTPATIENT
Start: 2018-11-25 | End: 2018-12-22 | Stop reason: SDUPTHER

## 2018-12-10 RX ORDER — METFORMIN HYDROCHLORIDE 500 MG/1
TABLET ORAL
Qty: 90 TABLET | Refills: 0 | Status: SHIPPED | OUTPATIENT
Start: 2018-12-10 | End: 2019-03-06 | Stop reason: SDUPTHER

## 2018-12-17 ENCOUNTER — OFFICE VISIT (OUTPATIENT)
Dept: HEMATOLOGY/ONCOLOGY | Facility: CLINIC | Age: 73
End: 2018-12-17
Payer: MEDICARE

## 2018-12-17 VITALS
DIASTOLIC BLOOD PRESSURE: 65 MMHG | TEMPERATURE: 98 F | RESPIRATION RATE: 20 BRPM | HEART RATE: 80 BPM | BODY MASS INDEX: 27.38 KG/M2 | WEIGHT: 149.69 LBS | SYSTOLIC BLOOD PRESSURE: 111 MMHG

## 2018-12-17 DIAGNOSIS — C50.411 MALIGNANT NEOPLASM OF UPPER-OUTER QUADRANT OF RIGHT BREAST IN FEMALE, ESTROGEN RECEPTOR POSITIVE: ICD-10-CM

## 2018-12-17 DIAGNOSIS — Z85.3 PERSONAL HISTORY OF BREAST CANCER: Primary | ICD-10-CM

## 2018-12-17 DIAGNOSIS — F41.9 ANXIETY: ICD-10-CM

## 2018-12-17 DIAGNOSIS — Z17.0 MALIGNANT NEOPLASM OF UPPER-OUTER QUADRANT OF RIGHT BREAST IN FEMALE, ESTROGEN RECEPTOR POSITIVE: ICD-10-CM

## 2018-12-17 DIAGNOSIS — C50.911 RECURRENT MALIGNANT NEOPLASM OF RIGHT BREAST: Chronic | ICD-10-CM

## 2018-12-17 LAB
ALBUMIN SERPL-MCNC: 4.2 G/DL (ref 3.1–4.7)
ALP SERPL-CCNC: 63 IU/L (ref 40–104)
ALT (SGPT): 16 IU/L (ref 3–33)
AST SERPL-CCNC: 21 IU/L (ref 10–40)
BASOPHILS NFR BLD: 0.1 K/UL (ref 0–0.2)
BASOPHILS NFR BLD: 2.5 %
BILIRUB SERPL-MCNC: 0.9 MG/DL (ref 0.3–1)
BUN SERPL-MCNC: 17 MG/DL (ref 8–20)
CALCIUM SERPL-MCNC: 9.2 MG/DL (ref 7.7–10.4)
CHLORIDE: 102 MMOL/L (ref 98–110)
CO2 SERPL-SCNC: 29.6 MMOL/L (ref 22.8–31.6)
CREATININE: 0.71 MG/DL (ref 0.6–1.4)
EOSINOPHIL NFR BLD: 0 K/UL (ref 0–0.7)
EOSINOPHIL NFR BLD: 0.8 %
ERYTHROCYTE [DISTWIDTH] IN BLOOD BY AUTOMATED COUNT: 13.2 % (ref 12.5–14.5)
GLUCOSE: 95 MG/DL (ref 70–99)
GRAN #: 1.4 K/UL (ref 1.4–6.5)
GRAN%: 57.4 %
HCT VFR BLD AUTO: 36.1 % (ref 36–48)
HGB BLD-MCNC: 11.8 G/DL (ref 12–15)
IMMATURE GRANS (ABS): 0 K/UL (ref 0–1)
IMMATURE GRANULOCYTES: 0.4 %
LYMPH #: 0.8 K/UL (ref 1.2–3.4)
LYMPH%: 32.6 %
MCH RBC QN AUTO: 36.6 PG (ref 25–35)
MCHC RBC AUTO-ENTMCNC: 32.7 G/DL (ref 31–36)
MCV RBC AUTO: 112.1 FL (ref 79–98)
MONO #: 0.2 K/UL (ref 0.1–0.6)
MONO%: 6.3 %
NUCLEATED RBCS: 0 %
NUCLEATED RED BLOOD CELLS: 0 /100 WBC
PERFORMED BY:: ABNORMAL
PLATELET # BLD AUTO: 175 K/UL (ref 140–440)
PMV BLD AUTO: 9.2 FL (ref 8.8–12.7)
POTASSIUM SERPL-SCNC: 4.3 MMOL/L (ref 3.5–5)
PROT SERPL-MCNC: 7 G/DL (ref 6–8.2)
RBC # BLD AUTO: 3.22 M/UL (ref 3.5–5.5)
SODIUM: 140 MMOL/L (ref 134–144)
WBC # BLD: 2.4 K/UL (ref 5–10)

## 2018-12-17 PROCEDURE — 99214 OFFICE O/P EST MOD 30 MIN: CPT | Mod: ,,, | Performed by: INTERNAL MEDICINE

## 2018-12-17 PROCEDURE — 1101F PT FALLS ASSESS-DOCD LE1/YR: CPT | Mod: ,,, | Performed by: INTERNAL MEDICINE

## 2018-12-17 RX ORDER — ALPRAZOLAM 0.25 MG/1
0.25 TABLET ORAL 3 TIMES DAILY PRN
Qty: 30 TABLET | Refills: 0 | Status: SHIPPED | OUTPATIENT
Start: 2018-12-17 | End: 2019-05-20 | Stop reason: SDUPTHER

## 2018-12-17 RX ORDER — DONEPEZIL HYDROCHLORIDE 5 MG/1
5 TABLET, FILM COATED ORAL NIGHTLY
COMMUNITY
End: 2019-04-08

## 2018-12-17 NOTE — LETTER
December 17, 2018      Cameron Lopez MD  [address]  [address]  [facility] [address]           [facility] - Hematology Oncology  [address]  [address]  [facility] [address]  Phone: [phone]  Fax: [phone]          Patient: Melissa Sood   MR Number: [id number]   YOB: 1945   Date of Visit: 12/17/2018       Dear Dr. Cameron Lopez:    Thank you for referring Melissa Sood to me for evaluation. Attached you will find relevant portions of my assessment and plan of care.    If you have questions, please do not hesitate to call me. I look forward to following Melissa Sood along with you.    Sincerely,    Rudolph Barton MD    Enclosure  CC:  No Recipients    If you would like to receive this communication electronically, please contact [email or web address] or [phone] to request more information on [employer] access.    For providers and/or their staff who would like to refer a patient to [facility], please contact us through our one-stop-shop provider referral line, [facility], at [phone].    If you feel you have received this communication in error or would no longer like to receive these types of communications, please e-mail [email or web address]

## 2018-12-17 NOTE — ASSESSMENT & PLAN NOTE
Patient is on lexapro but states she is still having significant episodes of anxiety.  Will add low-dose Xanax to use when she has these episodes.

## 2018-12-17 NOTE — PROGRESS NOTES
PROGRESS NOTE    Subjective:       Patient ID: Melissa Sood is a 73 y.o. female.    Breast Cancer:  Dx: 2003  T2N1M0 stage II  neoadj AC-T, surgery-XRT Arimidex-Femara till 2/2011  BRCA 1/2 neg  ER pos, Her 2 neg  Recurrence 3/2018  Started Ibrance/Exemestane 4/30/2018.     Chief Complaint:  Follow-up  breast cancer follow up.      History of Present Illness:   Melissa Sood is a 73 y.o. female who presents for follow up after 1.5 years.  Patient is doing well.  No on Ibrance and exemestane since 4/30/2018.     Patient has no new complaints at this time. She is feeling well.    Seeing neuro for memory issues.      Family and Social history reviewed and is unchanged from 9/22/2014      ROS:  Review of Systems   Constitutional: Negative for fever.   Respiratory: Negative for shortness of breath.    Cardiovascular: Negative for chest pain and leg swelling.   Gastrointestinal: Negative for abdominal pain and blood in stool.   Genitourinary: Negative for hematuria.   Skin: Negative for rash.          Current Outpatient Medications:     aspirin (ECOTRIN) 81 MG EC tablet, Take 81 mg by mouth once daily., Disp: , Rfl:     atorvastatin (LIPITOR) 20 MG tablet, TAKE 1 TABLET BY MOUTH ONCE DAILY, Disp: 90 tablet, Rfl: 1    donepezil (ARICEPT) 5 MG tablet, Take 5 mg by mouth every evening., Disp: , Rfl:     escitalopram oxalate (LEXAPRO) 20 MG tablet, TAKE 1 TABLET BY MOUTH EVERY DAY, Disp: 90 tablet, Rfl: 3    exemestane (AROMASIN) 25 mg tablet, TAKE 1 TABLET BY MOUTH EVERY DAY, Disp: 30 tablet, Rfl: 0    exemestane (AROMASIN) 25 mg tablet, TAKE 1 TABLET BY MOUTH EVERY DAY, Disp: 30 tablet, Rfl: 0    hydrocodone-acetaminophen 5-325mg (NORCO) 5-325 mg per tablet, , Disp: , Rfl:     IBRANCE 125 mg Cap, TAKE 1 CAPSULE (125MG) BY MOUTH DAILY FOR 21 DAYS THEN 7 DAYS OFF, Disp: 21 capsule, Rfl: 5    metFORMIN (GLUCOPHAGE) 500 MG tablet, TAKE 1 TABLET BY MOUTH ONCE DAILY  WITH A MEAL, Disp: 90 tablet, Rfl: 0    metFORMIN (GLUCOPHAGE) 500 MG tablet, TAKE 1 TABLET BY MOUTH ONCE DAILY WITH A MEAL, Disp: 90 tablet, Rfl: 0    ALPRAZolam (XANAX) 0.25 MG tablet, Take 1 tablet (0.25 mg total) by mouth 3 (three) times daily as needed for Anxiety., Disp: 30 tablet, Rfl: 0        Objective:       Physical Examination:     /65   Pulse 80   Temp 98.1 °F (36.7 °C)   Resp 20   Wt 67.9 kg (149 lb 11.2 oz)   BMI 27.38 kg/m²     Physical Exam   Constitutional: She appears well-developed and well-nourished.   HENT:   Head: Normocephalic and atraumatic.   Right Ear: External ear normal.   Left Ear: External ear normal.   Mouth/Throat: Oropharynx is clear and moist.   Eyes: Conjunctivae are normal. Pupils are equal, round, and reactive to light.   Neck: No tracheal deviation present. No thyromegaly present.   Cardiovascular: Normal rate, regular rhythm and normal heart sounds.   Pulmonary/Chest: Effort normal and breath sounds normal.   Abdominal: Soft. Bowel sounds are normal. She exhibits no distension and no mass. There is no tenderness.   Musculoskeletal: She exhibits no edema.   Neurological:   Neuro intact througout   Skin: No rash noted.   Psychiatric: She has a normal mood and affect. Her behavior is normal. Judgment and thought content normal.       Labs:   No results found for this or any previous visit (from the past 336 hour(s)).  CMP  Sodium   Date Value Ref Range Status   09/17/2018 141 134 - 144 mmol/L      Potassium   Date Value Ref Range Status   09/17/2018 4.2 3.5 - 5.0 mmol/L      Chloride   Date Value Ref Range Status   09/17/2018 104 98 - 110 mmol/L      CO2   Date Value Ref Range Status   09/17/2018 29.6 22.8 - 31.6 mmol/L      Glucose   Date Value Ref Range Status   09/17/2018 95 70 - 99 mg/dL      BUN, Bld   Date Value Ref Range Status   09/17/2018 16 8 - 20 mg/dL      Creatinine   Date Value Ref Range Status   09/17/2018 0.87 0.60 - 1.40 mg/dL      Calcium   Date  Value Ref Range Status   09/17/2018 9.4 7.7 - 10.4 mg/dL      Total Protein   Date Value Ref Range Status   09/17/2018 7.5 6.0 - 8.2 g/dL      Albumin   Date Value Ref Range Status   09/17/2018 4.0 3.1 - 4.7 g/dL      Total Bilirubin   Date Value Ref Range Status   09/17/2018 0.7 0.3 - 1.0 mg/dL      Alkaline Phosphatase   Date Value Ref Range Status   09/17/2018 79 40 - 104 IU/L      AST   Date Value Ref Range Status   09/17/2018 27 10 - 40 IU/L      ALT   Date Value Ref Range Status   01/30/2018 12 10 - 44 U/L Final     Anion Gap   Date Value Ref Range Status   01/30/2018 8 8 - 16 mmol/L Final     eGFR if    Date Value Ref Range Status   01/30/2018 >60.0 >60 mL/min/1.73 m^2 Final     eGFR if non    Date Value Ref Range Status   01/30/2018 >60.0 >60 mL/min/1.73 m^2 Final     Comment:     Calculation used to obtain the estimated glomerular filtration  rate (eGFR) is the CKD-EPI equation.        Lab Results   Component Value Date    CEA 1.8 07/19/2018     No results found for: PSA        Assessment/Plan:     Problem List Items Addressed This Visit     Recurrent malignant neoplasm of right breast (Chronic)     Patient continues on Ibrance and exemestane and is tolerating these medications well.  PET scan in September showed some improvement in the disease.  Will continue to follow this and will plan another PET in three months.  Labs look good as well.           Relevant Orders    NM PET CT Routine Skull to Mid Thigh    CBC auto differential    Comprehensive metabolic panel    Anxiety     Patient is on lexapro but states she is still having significant episodes of anxiety.  Will add low-dose Xanax to use when she has these episodes.           Relevant Medications    ALPRAZolam (XANAX) 0.25 MG tablet    Malignant neoplasm of upper-outer quadrant of right breast in female, estrogen receptor positive    Relevant Orders    NM PET CT Routine Skull to Mid Thigh    Personal history of breast  cancer - Primary    Relevant Orders    NM PET CT Routine Skull to Mid Thigh          Discussion:   High complexity, high risk medications.   Follow-up in about 3 months (around 3/17/2019).      Electronically signed by Rudolph Bryan

## 2018-12-17 NOTE — ASSESSMENT & PLAN NOTE
Patient continues on Ibrance and exemestane and is tolerating these medications well.  PET scan in September showed some improvement in the disease.  Will continue to follow this and will plan another PET in three months.  Labs look good as well.

## 2018-12-22 DIAGNOSIS — Z85.3 PERSONAL HISTORY OF MALIGNANT NEOPLASM OF BREAST: ICD-10-CM

## 2018-12-24 RX ORDER — EXEMESTANE 25 MG/1
TABLET ORAL
Qty: 30 TABLET | Refills: 0 | Status: SHIPPED | OUTPATIENT
Start: 2018-12-24 | End: 2019-01-16 | Stop reason: SDUPTHER

## 2018-12-31 RX ORDER — ESCITALOPRAM OXALATE 20 MG/1
TABLET ORAL
Qty: 90 TABLET | Refills: 0 | Status: SHIPPED | OUTPATIENT
Start: 2018-12-31 | End: 2019-03-28 | Stop reason: SDUPTHER

## 2019-01-16 DIAGNOSIS — Z85.3 PERSONAL HISTORY OF MALIGNANT NEOPLASM OF BREAST: ICD-10-CM

## 2019-01-17 RX ORDER — EXEMESTANE 25 MG/1
TABLET ORAL
Qty: 30 TABLET | Refills: 0 | Status: SHIPPED | OUTPATIENT
Start: 2019-01-17 | End: 2019-03-10 | Stop reason: SDUPTHER

## 2019-02-08 DIAGNOSIS — Z12.11 COLON CANCER SCREENING: ICD-10-CM

## 2019-02-18 RX ORDER — ATORVASTATIN CALCIUM 20 MG/1
TABLET, FILM COATED ORAL
Qty: 90 TABLET | Refills: 1 | Status: SHIPPED | OUTPATIENT
Start: 2019-02-18 | End: 2019-08-14 | Stop reason: SDUPTHER

## 2019-03-06 RX ORDER — METFORMIN HYDROCHLORIDE 500 MG/1
TABLET ORAL
Qty: 90 TABLET | Refills: 0 | Status: SHIPPED | OUTPATIENT
Start: 2019-03-06 | End: 2019-06-09 | Stop reason: SDUPTHER

## 2019-03-10 DIAGNOSIS — Z85.3 PERSONAL HISTORY OF MALIGNANT NEOPLASM OF BREAST: ICD-10-CM

## 2019-03-11 RX ORDER — EXEMESTANE 25 MG/1
TABLET ORAL
Qty: 30 TABLET | Refills: 0 | Status: SHIPPED | OUTPATIENT
Start: 2019-03-11 | End: 2019-04-04 | Stop reason: SDUPTHER

## 2019-03-14 ENCOUNTER — TELEPHONE (OUTPATIENT)
Dept: HEMATOLOGY/ONCOLOGY | Facility: CLINIC | Age: 74
End: 2019-03-14

## 2019-03-14 NOTE — TELEPHONE ENCOUNTER
Patients son called back to inform me that she is going to [facility] tomorrow to get PET completed before appointment on Monday.

## 2019-03-18 ENCOUNTER — OFFICE VISIT (OUTPATIENT)
Dept: HEMATOLOGY/ONCOLOGY | Facility: CLINIC | Age: 74
End: 2019-03-18
Payer: MEDICARE

## 2019-03-18 VITALS
SYSTOLIC BLOOD PRESSURE: 124 MMHG | HEART RATE: 84 BPM | RESPIRATION RATE: 20 BRPM | WEIGHT: 149.63 LBS | BODY MASS INDEX: 27.36 KG/M2 | TEMPERATURE: 99 F | DIASTOLIC BLOOD PRESSURE: 64 MMHG

## 2019-03-18 DIAGNOSIS — C50.911 RECURRENT MALIGNANT NEOPLASM OF RIGHT BREAST: Chronic | ICD-10-CM

## 2019-03-18 DIAGNOSIS — K59.03 DRUG-INDUCED CONSTIPATION: ICD-10-CM

## 2019-03-18 LAB
ALBUMIN SERPL-MCNC: 4.2 G/DL (ref 3.1–4.7)
ALP SERPL-CCNC: 64 IU/L (ref 40–104)
ALT (SGPT): 19 IU/L (ref 3–33)
AST SERPL-CCNC: 24 IU/L (ref 10–40)
BASOPHILS NFR BLD: 0.1 K/UL (ref 0–0.2)
BASOPHILS NFR BLD: 2.8 %
BILIRUB SERPL-MCNC: 1 MG/DL (ref 0.3–1)
BUN SERPL-MCNC: 16 MG/DL (ref 8–20)
CALCIUM SERPL-MCNC: 9.2 MG/DL (ref 7.7–10.4)
CHLORIDE: 104 MMOL/L (ref 98–110)
CO2 SERPL-SCNC: 28.1 MMOL/L (ref 22.8–31.6)
CREATININE: 0.75 MG/DL (ref 0.6–1.4)
EOSINOPHIL NFR BLD: 0 K/UL (ref 0–0.7)
EOSINOPHIL NFR BLD: 1.2 %
ERYTHROCYTE [DISTWIDTH] IN BLOOD BY AUTOMATED COUNT: 12.9 % (ref 12.5–14.5)
GLUCOSE: 95 MG/DL (ref 70–99)
GRAN #: 1.5 K/UL (ref 1.4–6.5)
GRAN%: 61.2 %
HCT VFR BLD AUTO: 34.9 % (ref 36–48)
HGB BLD-MCNC: 11.7 G/DL (ref 12–15)
IMMATURE GRANS (ABS): 0 K/UL (ref 0–1)
IMMATURE GRANULOCYTES: 0.4 %
LYMPH #: 0.8 K/UL (ref 1.2–3.4)
LYMPH%: 30.8 %
MCH RBC QN AUTO: 36.7 PG (ref 25–35)
MCHC RBC AUTO-ENTMCNC: 33.5 G/DL (ref 31–36)
MCV RBC AUTO: 109.4 FL (ref 79–98)
MONO #: 0.1 K/UL (ref 0.1–0.6)
MONO%: 3.6 %
NUCLEATED RBCS: 0 %
NUCLEATED RED BLOOD CELLS: 0 /100 WBC
PERFORMED BY:: ABNORMAL
PLATELET # BLD AUTO: 142 K/UL (ref 140–440)
PMV BLD AUTO: 9.8 FL (ref 8.8–12.7)
POTASSIUM SERPL-SCNC: 4.2 MMOL/L (ref 3.5–5)
PROT SERPL-MCNC: 7.2 G/DL (ref 6–8.2)
RBC # BLD AUTO: 3.19 M/UL (ref 3.5–5.5)
SODIUM: 139 MMOL/L (ref 134–144)
WBC # BLD: 2.5 K/UL (ref 5–10)

## 2019-03-18 PROCEDURE — 1101F PT FALLS ASSESS-DOCD LE1/YR: CPT | Mod: ,,, | Performed by: INTERNAL MEDICINE

## 2019-03-18 PROCEDURE — 99214 OFFICE O/P EST MOD 30 MIN: CPT | Mod: ,,, | Performed by: INTERNAL MEDICINE

## 2019-03-18 PROCEDURE — 99214 PR OFFICE/OUTPT VISIT, EST, LEVL IV, 30-39 MIN: ICD-10-PCS | Mod: ,,, | Performed by: INTERNAL MEDICINE

## 2019-03-18 PROCEDURE — 1101F PR PT FALLS ASSESS DOC 0-1 FALLS W/OUT INJ PAST YR: ICD-10-PCS | Mod: ,,, | Performed by: INTERNAL MEDICINE

## 2019-03-18 NOTE — LETTER
March 18, 2019      Cameron Lopez MD  [address]  [address]  [facility] [address]           [facility] - Hematology Oncology  [address]  [address]  [place] [facility] [place] [address]  Phone: [phone]  Fax: [phone]          Patient: Melissa Sood   MR Number: [id number]   YOB: 1945   Date of Visit: 3/18/2019       Dear Dr. Cameron Lopez:    Thank you for referring Melissa Sood to me for evaluation. Attached you will find relevant portions of my assessment and plan of care.    If you have questions, please do not hesitate to call me. I look forward to following Melissa Sood along with you.    Sincerely,    Rudolph Barton MD    Enclosure  CC:  No Recipients    If you would like to receive this communication electronically, please contact [email or web address] or [phone] to request more information on [employer] Link access.    For providers and/or their staff who would like to refer a patient to [facility], please contact us through our one-stop-shop provider referral line, [facility], at [phone].    If you feel you have received this communication in error or would no longer like to receive these types of communications, please e-mail [email or web address]

## 2019-03-18 NOTE — ASSESSMENT & PLAN NOTE
Patient continues on Ibrance and is doing well.  Tolerating the medication well and PET shows no new disease and appears to be in remission.  Discussed this today and will continue this medication as long as it continues to work.

## 2019-03-18 NOTE — PROGRESS NOTES
PROGRESS NOTE    Subjective:       Patient ID: Melissa Sood is a 73 y.o. female.    Breast Cancer:  Dx: 2003  T2N1M0 stage II  neoadj AC-T, surgery-XRT Arimidex-Femara till 2/2011  BRCA 1/2 neg  ER pos, Her 2 neg  Recurrence 3/2018  Started Ibrance/Exemestane 4/30/2018.     Chief Complaint:  Follow-up  breast cancer follow up.      History of Present Illness:   Melissa Sood is a 73 y.o. female who presents for follow up after 1.5 years.  Patient is doing well.  Now on Ibrance and exemestane since 4/30/2018.   Patient is tolerating this well.  Complains of constipation.      Family and Social history reviewed and is unchanged from 9/22/2014      ROS:  Review of Systems   Constitutional: Negative for fever.   Respiratory: Negative for shortness of breath.    Cardiovascular: Negative for chest pain and leg swelling.   Gastrointestinal: Negative for abdominal pain and blood in stool.   Genitourinary: Negative for hematuria.   Skin: Negative for rash.          Current Outpatient Medications:     ALPRAZolam (XANAX) 0.25 MG tablet, Take 1 tablet (0.25 mg total) by mouth 3 (three) times daily as needed for Anxiety., Disp: 30 tablet, Rfl: 0    aspirin (ECOTRIN) 81 MG EC tablet, Take 81 mg by mouth once daily., Disp: , Rfl:     atorvastatin (LIPITOR) 20 MG tablet, TAKE 1 TABLET BY MOUTH ONCE DAILY, Disp: 90 tablet, Rfl: 1    donepezil (ARICEPT) 5 MG tablet, Take 5 mg by mouth every evening., Disp: , Rfl:     escitalopram oxalate (LEXAPRO) 20 MG tablet, TAKE 1 TABLET BY MOUTH ONCE DAILY( GENERIC EQUIVALENT TO LEXAPRO), Disp: 90 tablet, Rfl: 0    exemestane (AROMASIN) 25 mg tablet, TAKE 1 TABLET BY MOUTH EVERY DAY, Disp: 30 tablet, Rfl: 0    exemestane (AROMASIN) 25 mg tablet, TAKE 1 TABLET BY MOUTH EVERY DAY, Disp: 30 tablet, Rfl: 0    hydrocodone-acetaminophen 5-325mg (NORCO) 5-325 mg per tablet, , Disp: , Rfl:     IBRANCE 125 mg Cap, TAKE 1 CAPSULE (125MG) BY  MOUTH DAILY FOR 21 DAYS THEN 7 DAYS OFF, Disp: 21 capsule, Rfl: 5    metFORMIN (GLUCOPHAGE) 500 MG tablet, TAKE 1 TABLET BY MOUTH ONCE DAILY WITH A MEAL, Disp: 90 tablet, Rfl: 0    metFORMIN (GLUCOPHAGE) 500 MG tablet, TAKE 1 TABLET BY MOUTH EVERY DAY WITH A MEAL, Disp: 90 tablet, Rfl: 0        Objective:       Physical Examination:     /64   Pulse 84   Temp 98.8 °F (37.1 °C)   Resp 20   Wt 67.9 kg (149 lb 9.6 oz)   BMI 27.36 kg/m²     Physical Exam   Constitutional: She appears well-developed and well-nourished.   HENT:   Head: Normocephalic and atraumatic.   Right Ear: External ear normal.   Left Ear: External ear normal.   Mouth/Throat: Oropharynx is clear and moist.   Eyes: Conjunctivae are normal. Pupils are equal, round, and reactive to light.   Neck: No tracheal deviation present. No thyromegaly present.   Cardiovascular: Normal rate, regular rhythm and normal heart sounds.   Pulmonary/Chest: Effort normal and breath sounds normal.   Abdominal: Soft. Bowel sounds are normal. She exhibits no distension and no mass. There is no tenderness.   Musculoskeletal: She exhibits no edema.   Neurological:   Neuro intact througout   Skin: No rash noted.   Psychiatric: She has a normal mood and affect. Her behavior is normal. Judgment and thought content normal.       Labs:   No results found for this or any previous visit (from the past 336 hour(s)).  CMP  Sodium   Date Value Ref Range Status   12/17/2018 140 134 - 144 mmol/L      Potassium   Date Value Ref Range Status   12/17/2018 4.3 3.5 - 5.0 mmol/L      Chloride   Date Value Ref Range Status   12/17/2018 102 98 - 110 mmol/L      CO2   Date Value Ref Range Status   12/17/2018 29.6 22.8 - 31.6 mmol/L      Glucose   Date Value Ref Range Status   12/17/2018 95 70 - 99 mg/dL      BUN, Bld   Date Value Ref Range Status   12/17/2018 17 8 - 20 mg/dL      Creatinine   Date Value Ref Range Status   12/17/2018 0.71 0.60 - 1.40 mg/dL      Calcium   Date Value Ref  Range Status   12/17/2018 9.2 7.7 - 10.4 mg/dL      Total Protein   Date Value Ref Range Status   12/17/2018 7.0 6.0 - 8.2 g/dL      Albumin   Date Value Ref Range Status   12/17/2018 4.2 3.1 - 4.7 g/dL      Total Bilirubin   Date Value Ref Range Status   12/17/2018 0.9 0.3 - 1.0 mg/dL      Alkaline Phosphatase   Date Value Ref Range Status   12/17/2018 63 40 - 104 IU/L      AST   Date Value Ref Range Status   12/17/2018 21 10 - 40 IU/L      ALT   Date Value Ref Range Status   01/30/2018 12 10 - 44 U/L Final     Anion Gap   Date Value Ref Range Status   01/30/2018 8 8 - 16 mmol/L Final     eGFR if    Date Value Ref Range Status   01/30/2018 >60.0 >60 mL/min/1.73 m^2 Final     eGFR if non    Date Value Ref Range Status   01/30/2018 >60.0 >60 mL/min/1.73 m^2 Final     Comment:     Calculation used to obtain the estimated glomerular filtration  rate (eGFR) is the CKD-EPI equation.        Lab Results   Component Value Date    CEA 1.8 07/19/2018     No results found for: PSA        Assessment/Plan:     Problem List Items Addressed This Visit     Recurrent malignant neoplasm of right breast (Chronic)     Patient continues on Ibrance and is doing well.  Tolerating the medication well and PET shows no new disease and appears to be in remission.  Discussed this today and will continue this medication as long as it continues to work.           Relevant Orders    Cancer antigen 15-3    Cancer antigen 27.29    Drug-induced constipation     This is a new problem and I'm Recommending stool softeners OTC at this time.  Will continue to watch this and treat as needed.           Relevant Orders    CBC auto differential    Comprehensive metabolic panel    Cancer antigen 15-3    Cancer antigen 27.29          Discussion:   High complexity, high risk medications.   Follow-up in about 3 months (around 6/18/2019).      Electronically signed by Rudolph Bryan

## 2019-03-18 NOTE — ASSESSMENT & PLAN NOTE
This is a new problem and I'm Recommending stool softeners OTC at this time.  Will continue to watch this and treat as needed.

## 2019-03-19 LAB — CANCER AG15-3 SERPL-ACNC: 44.1 U/ML (ref 0–25)

## 2019-03-20 LAB — CA 27.29: 68.3 U/ML (ref 0–38.6)

## 2019-03-28 RX ORDER — ESCITALOPRAM OXALATE 20 MG/1
TABLET ORAL
Qty: 90 TABLET | Refills: 0 | Status: SHIPPED | OUTPATIENT
Start: 2019-03-28 | End: 2019-06-24

## 2019-04-04 DIAGNOSIS — Z85.3 PERSONAL HISTORY OF MALIGNANT NEOPLASM OF BREAST: ICD-10-CM

## 2019-04-05 RX ORDER — EXEMESTANE 25 MG/1
TABLET ORAL
Qty: 30 TABLET | Refills: 0 | Status: SHIPPED | OUTPATIENT
Start: 2019-04-05 | End: 2019-05-02 | Stop reason: SDUPTHER

## 2019-04-08 ENCOUNTER — OFFICE VISIT (OUTPATIENT)
Dept: PRIMARY CARE CLINIC | Facility: CLINIC | Age: 74
End: 2019-04-08
Payer: MEDICARE

## 2019-04-08 VITALS
SYSTOLIC BLOOD PRESSURE: 111 MMHG | TEMPERATURE: 98 F | RESPIRATION RATE: 18 BRPM | WEIGHT: 146.31 LBS | BODY MASS INDEX: 26.93 KG/M2 | HEART RATE: 81 BPM | HEIGHT: 62 IN | DIASTOLIC BLOOD PRESSURE: 67 MMHG

## 2019-04-08 DIAGNOSIS — Z23 NEED FOR VACCINATION: ICD-10-CM

## 2019-04-08 DIAGNOSIS — G30.9 ALZHEIMER'S DEMENTIA WITHOUT BEHAVIORAL DISTURBANCE, UNSPECIFIED TIMING OF DEMENTIA ONSET: ICD-10-CM

## 2019-04-08 DIAGNOSIS — M89.9 DISORDER OF BONE: ICD-10-CM

## 2019-04-08 DIAGNOSIS — E78.5 HYPERLIPIDEMIA, UNSPECIFIED HYPERLIPIDEMIA TYPE: ICD-10-CM

## 2019-04-08 DIAGNOSIS — E11.69 TYPE 2 DIABETES MELLITUS WITH HYPERLIPIDEMIA: Primary | ICD-10-CM

## 2019-04-08 DIAGNOSIS — F02.80 ALZHEIMER'S DEMENTIA WITHOUT BEHAVIORAL DISTURBANCE, UNSPECIFIED TIMING OF DEMENTIA ONSET: ICD-10-CM

## 2019-04-08 DIAGNOSIS — E78.5 TYPE 2 DIABETES MELLITUS WITH HYPERLIPIDEMIA: Primary | ICD-10-CM

## 2019-04-08 PROCEDURE — 99214 PR OFFICE/OUTPT VISIT, EST, LEVL IV, 30-39 MIN: ICD-10-PCS | Mod: 25,S$GLB,, | Performed by: FAMILY MEDICINE

## 2019-04-08 PROCEDURE — 90714 TD VACC NO PRESV 7 YRS+ IM: CPT | Mod: S$GLB,,, | Performed by: FAMILY MEDICINE

## 2019-04-08 PROCEDURE — G0009 ADMIN PNEUMOCOCCAL VACCINE: HCPCS | Mod: 59,S$GLB,, | Performed by: FAMILY MEDICINE

## 2019-04-08 PROCEDURE — 99999 PR PBB SHADOW E&M-EST. PATIENT-LVL IV: ICD-10-PCS | Mod: PBBFAC,,, | Performed by: FAMILY MEDICINE

## 2019-04-08 PROCEDURE — 90732 PPSV23 VACC 2 YRS+ SUBQ/IM: CPT | Mod: S$GLB,,, | Performed by: FAMILY MEDICINE

## 2019-04-08 PROCEDURE — 90471 PNEUMOCOCCAL POLYSACCHARIDE VACCINE 23-VALENT =>2YO SQ IM: ICD-10-PCS | Mod: S$GLB,,, | Performed by: FAMILY MEDICINE

## 2019-04-08 PROCEDURE — 90471 IMMUNIZATION ADMIN: CPT | Mod: S$GLB,,, | Performed by: FAMILY MEDICINE

## 2019-04-08 PROCEDURE — 99214 OFFICE O/P EST MOD 30 MIN: CPT | Mod: 25,S$GLB,, | Performed by: FAMILY MEDICINE

## 2019-04-08 PROCEDURE — 90732 PNEUMOCOCCAL POLYSACCHARIDE VACCINE 23-VALENT =>2YO SQ IM: ICD-10-PCS | Mod: S$GLB,,, | Performed by: FAMILY MEDICINE

## 2019-04-08 PROCEDURE — 1101F PT FALLS ASSESS-DOCD LE1/YR: CPT | Mod: CPTII,S$GLB,, | Performed by: FAMILY MEDICINE

## 2019-04-08 PROCEDURE — 90714 TD VACCINE GREATER THAN OR EQUAL TO 7YO PRESERVATIVE FREE IM: ICD-10-PCS | Mod: S$GLB,,, | Performed by: FAMILY MEDICINE

## 2019-04-08 PROCEDURE — 1101F PR PT FALLS ASSESS DOC 0-1 FALLS W/OUT INJ PAST YR: ICD-10-PCS | Mod: CPTII,S$GLB,, | Performed by: FAMILY MEDICINE

## 2019-04-08 PROCEDURE — G0009 TD VACCINE GREATER THAN OR EQUAL TO 7YO PRESERVATIVE FREE IM: ICD-10-PCS | Mod: 59,S$GLB,, | Performed by: FAMILY MEDICINE

## 2019-04-08 PROCEDURE — 99999 PR PBB SHADOW E&M-EST. PATIENT-LVL IV: CPT | Mod: PBBFAC,,, | Performed by: FAMILY MEDICINE

## 2019-04-08 RX ORDER — DONEPEZIL HYDROCHLORIDE 10 MG/1
1 TABLET, FILM COATED ORAL NIGHTLY
COMMUNITY

## 2019-04-08 RX ORDER — MEMANTINE HYDROCHLORIDE 28 MG/1
1 CAPSULE, EXTENDED RELEASE ORAL DAILY
Refills: 6 | COMMUNITY
Start: 2019-01-22 | End: 2019-06-24

## 2019-04-08 RX ORDER — ESCITALOPRAM OXALATE 20 MG/1
TABLET ORAL
Qty: 90 TABLET | Refills: 0 | Status: SHIPPED | OUTPATIENT
Start: 2019-04-08

## 2019-04-08 NOTE — PROGRESS NOTES
Verified pt ID using name and . Verified pt allergies. Administered pneumonia 23 in left deltoid and Td in right deltoid per physician order using aseptic technique. Aspirated and no blood return noted. PT tolerated well with no adverse reactions noted

## 2019-04-08 NOTE — PROGRESS NOTES
"Subjective:       Patient ID: Melissa Sood is a 73 y.o. female.    Chief Complaint: Follow-up    Patient here for regular checkup, no specific complaints.  Not checking her sugar, but compliant with all of her medications.  Breast cancer appears to be in remission.  Seeing neurologist for treatment of dementia, which is gradually worsening.    Review of Systems   Constitutional: Negative for chills, fatigue and fever.   HENT: Negative for congestion.    Eyes: Negative for visual disturbance.   Respiratory: Negative for cough and shortness of breath.    Cardiovascular: Negative for chest pain.   Gastrointestinal: Negative for abdominal pain, nausea and vomiting.   Genitourinary: Negative for difficulty urinating.   Musculoskeletal: Negative for arthralgias.   Skin: Negative for rash.   Neurological: Negative for dizziness.   Psychiatric/Behavioral: Positive for confusion. Negative for sleep disturbance.       Objective:      Vitals:    04/08/19 1321   BP: 111/67   BP Location: Right arm   Patient Position: Sitting   BP Method: Medium (Automatic)   Pulse: 81   Resp: 18   Temp: 97.6 °F (36.4 °C)   TempSrc: Oral   Weight: 66.4 kg (146 lb 4.8 oz)   Height: 5' 2" (1.575 m)     Lab Results   Component Value Date    WBC 2.5 (L) 03/18/2019    HGB 11.7 (L) 03/18/2019    HCT 34.9 (L) 03/18/2019     03/18/2019    CHOL 146 01/30/2018    TRIG 82 01/30/2018    HDL 51 01/30/2018    ALT 12 01/30/2018    AST 24 03/18/2019     03/18/2019    K 4.2 03/18/2019     03/18/2019    CREATININE 0.75 03/18/2019    BUN 16 03/18/2019    CO2 28.1 03/18/2019    TSH 1.354 01/30/2018    HGBA1C 5.9 (H) 01/30/2018     Physical Exam   Constitutional: She appears well-developed and well-nourished.   HENT:   Head: Normocephalic and atraumatic.   Eyes: Pupils are equal, round, and reactive to light. EOM are normal.   Neck: Neck supple. No JVD present. Carotid bruit is not present.   Cardiovascular: Normal rate, regular rhythm and normal " heart sounds.   Pulses:       Radial pulses are 2+ on the right side, and 2+ on the left side.        Dorsalis pedis pulses are 2+ on the right side, and 2+ on the left side.   Pulmonary/Chest: Effort normal and breath sounds normal.   Abdominal: Soft. Bowel sounds are normal. She exhibits no distension. There is no tenderness.   Musculoskeletal: She exhibits no edema.   Feet:   Right Foot:   Protective Sensation: 5 sites tested. 5 sites sensed.   Skin Integrity: Negative for ulcer, blister or skin breakdown.   Left Foot:   Protective Sensation: 5 sites tested. 5 sites sensed.   Skin Integrity: Negative for ulcer, blister or skin breakdown.   Neurological: She is alert. She is disoriented (oriented to person and place).   Skin: Skin is warm and dry.   Psychiatric: She has a normal mood and affect. Her behavior is normal.   Nursing note and vitals reviewed.      Assessment:       1. Type 2 diabetes mellitus with hyperlipidemia    2. Hyperlipidemia, unspecified hyperlipidemia type    3. Alzheimer's dementia without behavioral disturbance, unspecified timing of dementia onset    4. Disorder of bone    5. Need for vaccination        Plan:       Type 2 diabetes mellitus with hyperlipidemia  -      DIABETES FOOT EXAM  -     Comprehensive metabolic panel; Future; Expected date: 04/08/2019  -     Hemoglobin A1c; Future; Expected date: 04/08/2019  -     Microalbumin/creatinine urine ratio; Future; Expected date: 04/08/2019  Continue current meds, needs updated labs  Hyperlipidemia, unspecified hyperlipidemia type  -     Comprehensive metabolic panel; Future; Expected date: 04/08/2019  -     Lipid panel; Future; Expected date: 04/08/2019  Needs updated labs  Alzheimer's dementia without behavioral disturbance, unspecified timing of dementia onset  Continue present medications, follow up with Neurology  Disorder of bone  -     DXA Bone Density Spine And Hip; Future; Expected date: 04/15/2019    Need for vaccination  -     Td  Vaccine (Adult) - Preservative Free  -     Pneumococcal Polysaccharide Vaccine (23 Valent) (SQ/IM)      Medication List with Changes/Refills   Current Medications    ALPRAZOLAM (XANAX) 0.25 MG TABLET    Take 1 tablet (0.25 mg total) by mouth 3 (three) times daily as needed for Anxiety.    ASPIRIN (ECOTRIN) 81 MG EC TABLET    Take 81 mg by mouth once daily.    ATORVASTATIN (LIPITOR) 20 MG TABLET    TAKE 1 TABLET BY MOUTH ONCE DAILY    DONEPEZIL (ARICEPT) 10 MG TABLET    Take 1 tablet by mouth every evening.    ESCITALOPRAM OXALATE (LEXAPRO) 20 MG TABLET    TAKE 1 TABLET BY MOUTH EVERY DAY    ESCITALOPRAM OXALATE (LEXAPRO) 20 MG TABLET    TAKE 1 TABLET BY MOUTH EVERY DAY    EXEMESTANE (AROMASIN) 25 MG TABLET    TAKE 1 TABLET BY MOUTH EVERY DAY    HYDROCODONE-ACETAMINOPHEN 5-325MG (NORCO) 5-325 MG PER TABLET        IBRANCE 125 MG CAP    TAKE 1 CAPSULE (125MG) BY MOUTH DAILY FOR 21 DAYS THEN 7 DAYS OFF    MEMANTINE (NAMENDA XR) 28 MG CSPX    Take 1 capsule by mouth once daily.    METFORMIN (GLUCOPHAGE) 500 MG TABLET    TAKE 1 TABLET BY MOUTH EVERY DAY WITH A MEAL   Discontinued Medications    DONEPEZIL (ARICEPT) 5 MG TABLET    Take 5 mg by mouth every evening.    METFORMIN (GLUCOPHAGE) 500 MG TABLET    TAKE 1 TABLET BY MOUTH ONCE DAILY WITH A MEAL

## 2019-04-16 ENCOUNTER — CLINICAL SUPPORT (OUTPATIENT)
Dept: PRIMARY CARE CLINIC | Facility: CLINIC | Age: 74
End: 2019-04-16
Payer: MEDICARE

## 2019-04-16 DIAGNOSIS — E78.5 TYPE 2 DIABETES MELLITUS WITH HYPERLIPIDEMIA: ICD-10-CM

## 2019-04-16 DIAGNOSIS — E78.5 HYPERLIPIDEMIA, UNSPECIFIED HYPERLIPIDEMIA TYPE: ICD-10-CM

## 2019-04-16 DIAGNOSIS — E11.69 TYPE 2 DIABETES MELLITUS WITH HYPERLIPIDEMIA: ICD-10-CM

## 2019-04-16 LAB
ALBUMIN SERPL BCP-MCNC: 4.3 G/DL (ref 3.5–5.2)
ALBUMIN/CREAT UR: 9.3 UG/MG (ref 0–30)
ALP SERPL-CCNC: 68 U/L (ref 38–126)
ALT SERPL W/O P-5'-P-CCNC: 25 U/L (ref 14–54)
ANION GAP SERPL CALC-SCNC: 9 MMOL/L (ref 8–16)
AST SERPL-CCNC: 25 U/L (ref 15–41)
BILIRUB SERPL-MCNC: 1.1 MG/DL (ref 0.3–1.2)
BUN SERPL-MCNC: 12 MG/DL (ref 8–23)
CALCIUM SERPL-MCNC: 9.4 MG/DL (ref 8.6–10)
CHLORIDE SERPL-SCNC: 101 MMOL/L (ref 101–111)
CHOLEST SERPL-MCNC: 107 MG/DL (ref 80–200)
CHOLEST/HDLC SERPL: 2.1 {RATIO} (ref 2–5)
CO2 SERPL-SCNC: 28 MMOL/L (ref 23–29)
CREAT SERPL-MCNC: 0.8 MG/DL (ref 0.5–1.4)
CREAT UR-MCNC: 86 MG/DL (ref 15–325)
EST. GFR  (AFRICAN AMERICAN): >60 ML/MIN/1.73 M^2
EST. GFR  (NON AFRICAN AMERICAN): >60 ML/MIN/1.73 M^2
ESTIMATED AVG GLUCOSE: 123 MG/DL (ref 68–131)
GLUCOSE SERPL-MCNC: 119 MG/DL (ref 74–118)
HBA1C MFR BLD HPLC: 5.9 % (ref 4–5.6)
HDLC SERPL-MCNC: 52 MG/DL (ref 40–75)
HDLC SERPL: 48.6 % (ref 20–50)
LDLC SERPL CALC-MCNC: 44 MG/DL
MICROALBUMIN UR DL<=1MG/L-MCNC: 8 UG/ML
NONHDLC SERPL-MCNC: 55 MG/DL
POTASSIUM SERPL-SCNC: 4.4 MMOL/L (ref 3.5–5.1)
PROT SERPL-MCNC: 7.7 G/DL (ref 6–8.4)
SODIUM SERPL-SCNC: 138 MMOL/L (ref 136–145)
TRIGL SERPL-MCNC: 53 MG/DL (ref 30–150)

## 2019-04-16 PROCEDURE — 80061 LIPID PANEL: CPT

## 2019-04-16 PROCEDURE — 80053 COMPREHEN METABOLIC PANEL: CPT

## 2019-04-16 PROCEDURE — 82043 UR ALBUMIN QUANTITATIVE: CPT

## 2019-04-16 PROCEDURE — 83036 HEMOGLOBIN GLYCOSYLATED A1C: CPT

## 2019-05-02 DIAGNOSIS — Z85.3 PERSONAL HISTORY OF MALIGNANT NEOPLASM OF BREAST: ICD-10-CM

## 2019-05-02 RX ORDER — EXEMESTANE 25 MG/1
TABLET ORAL
Qty: 30 TABLET | Refills: 0 | Status: SHIPPED | OUTPATIENT
Start: 2019-05-02 | End: 2019-05-29 | Stop reason: SDUPTHER

## 2019-05-20 DIAGNOSIS — F41.9 ANXIETY: ICD-10-CM

## 2019-05-21 ENCOUNTER — TELEPHONE (OUTPATIENT)
Dept: HEMATOLOGY/ONCOLOGY | Facility: CLINIC | Age: 74
End: 2019-05-21

## 2019-05-21 RX ORDER — ALPRAZOLAM 0.25 MG/1
TABLET ORAL
Qty: 30 TABLET | Refills: 0 | Status: SHIPPED | OUTPATIENT
Start: 2019-05-21

## 2019-05-21 NOTE — TELEPHONE ENCOUNTER
----- Message from Rudolph Barton MD sent at 5/21/2019  8:54 AM CDT -----  Please call the patient regarding her abnormal result. She has a UTI.  Make sure it is being addressed.      Per Dr. Barton I called in Bactrim DS 1 po bid x 5 days to [facility] in [place]. This is the pharmacy Melissa asked me to call it to.

## 2019-05-29 DIAGNOSIS — Z85.3 PERSONAL HISTORY OF MALIGNANT NEOPLASM OF BREAST: ICD-10-CM

## 2019-05-29 RX ORDER — EXEMESTANE 25 MG/1
TABLET ORAL
Qty: 30 TABLET | Refills: 0 | Status: SHIPPED | OUTPATIENT
Start: 2019-05-29 | End: 2019-07-11 | Stop reason: SDUPTHER

## 2019-06-10 RX ORDER — METFORMIN HYDROCHLORIDE 500 MG/1
TABLET ORAL
Qty: 90 TABLET | Refills: 0 | Status: SHIPPED | OUTPATIENT
Start: 2019-06-10 | End: 2019-09-08 | Stop reason: SDUPTHER

## 2019-06-24 ENCOUNTER — OFFICE VISIT (OUTPATIENT)
Dept: HEMATOLOGY/ONCOLOGY | Facility: CLINIC | Age: 74
End: 2019-06-24
Payer: MEDICARE

## 2019-06-24 VITALS
WEIGHT: 145.5 LBS | TEMPERATURE: 98 F | HEART RATE: 66 BPM | RESPIRATION RATE: 20 BRPM | SYSTOLIC BLOOD PRESSURE: 117 MMHG | BODY MASS INDEX: 26.61 KG/M2 | DIASTOLIC BLOOD PRESSURE: 66 MMHG

## 2019-06-24 DIAGNOSIS — C50.911 RECURRENT MALIGNANT NEOPLASM OF RIGHT BREAST: Chronic | ICD-10-CM

## 2019-06-24 DIAGNOSIS — F02.80 ALZHEIMER'S DEMENTIA WITHOUT BEHAVIORAL DISTURBANCE, UNSPECIFIED TIMING OF DEMENTIA ONSET: ICD-10-CM

## 2019-06-24 DIAGNOSIS — G30.9 ALZHEIMER'S DEMENTIA WITHOUT BEHAVIORAL DISTURBANCE, UNSPECIFIED TIMING OF DEMENTIA ONSET: ICD-10-CM

## 2019-06-24 LAB
ALBUMIN SERPL-MCNC: 4.3 G/DL (ref 3.1–4.7)
ALP SERPL-CCNC: 60 IU/L (ref 40–104)
ALT (SGPT): 15 IU/L (ref 3–33)
AST SERPL-CCNC: 20 IU/L (ref 10–40)
BASOPHILS NFR BLD: 0.1 K/UL (ref 0–0.2)
BASOPHILS NFR BLD: 2.8 %
BILIRUB SERPL-MCNC: 0.9 MG/DL (ref 0.3–1)
BUN SERPL-MCNC: 17 MG/DL (ref 8–20)
CALCIUM SERPL-MCNC: 9.1 MG/DL (ref 7.7–10.4)
CEA: 2.6 NG/ML
CHLORIDE: 103 MMOL/L (ref 98–110)
CO2 SERPL-SCNC: 29.7 MMOL/L (ref 22.8–31.6)
CREATININE: 0.82 MG/DL (ref 0.6–1.4)
EOSINOPHIL NFR BLD: 0.1 K/UL (ref 0–0.7)
EOSINOPHIL NFR BLD: 2 %
ERYTHROCYTE [DISTWIDTH] IN BLOOD BY AUTOMATED COUNT: 13.7 % (ref 12.5–14.5)
GLUCOSE: 87 MG/DL (ref 70–99)
GRAN #: 1.3 K/UL (ref 1.4–6.5)
GRAN%: 53.3 %
HCT VFR BLD AUTO: 34.6 % (ref 36–48)
HGB BLD-MCNC: 11.6 G/DL (ref 12–15)
IMMATURE GRANS (ABS): 0 K/UL (ref 0–1)
IMMATURE GRANULOCYTES: 0.4 %
LYMPH #: 0.9 K/UL (ref 1.2–3.4)
LYMPH%: 35.5 %
MCH RBC QN AUTO: 36.9 PG (ref 25–35)
MCHC RBC AUTO-ENTMCNC: 33.5 G/DL (ref 31–36)
MCV RBC AUTO: 110.2 FL (ref 79–98)
MONO #: 0.2 K/UL (ref 0.1–0.6)
MONO%: 6 %
NUCLEATED RBCS: 0 %
NUCLEATED RED BLOOD CELLS: 0 /100 WBC
PERFORMED BY:: ABNORMAL
PLATELET # BLD AUTO: 100 K/UL (ref 140–440)
PMV BLD AUTO: 9.7 FL (ref 8.8–12.7)
POTASSIUM SERPL-SCNC: 3.8 MMOL/L (ref 3.5–5)
PROT SERPL-MCNC: 7.4 G/DL (ref 6–8.2)
RBC # BLD AUTO: 3.14 M/UL (ref 3.5–5.5)
SODIUM: 141 MMOL/L (ref 134–144)
WBC # BLD: 2.5 K/UL (ref 5–10)

## 2019-06-24 PROCEDURE — 99214 OFFICE O/P EST MOD 30 MIN: CPT | Mod: ,,, | Performed by: INTERNAL MEDICINE

## 2019-06-24 PROCEDURE — 1101F PT FALLS ASSESS-DOCD LE1/YR: CPT | Mod: ,,, | Performed by: INTERNAL MEDICINE

## 2019-06-24 PROCEDURE — 99214 PR OFFICE/OUTPT VISIT, EST, LEVL IV, 30-39 MIN: ICD-10-PCS | Mod: ,,, | Performed by: INTERNAL MEDICINE

## 2019-06-24 PROCEDURE — 1101F PR PT FALLS ASSESS DOC 0-1 FALLS W/OUT INJ PAST YR: ICD-10-PCS | Mod: ,,, | Performed by: INTERNAL MEDICINE

## 2019-06-24 RX ORDER — CYANOCOBALAMIN 1000 UG/ML
INJECTION, SOLUTION INTRAMUSCULAR; SUBCUTANEOUS
Refills: 6 | COMMUNITY
Start: 2019-06-20 | End: 2020-09-08

## 2019-06-24 RX ORDER — MEMANTINE HYDROCHLORIDE 28 MG/1
CAPSULE, EXTENDED RELEASE ORAL
COMMUNITY

## 2019-06-24 NOTE — PROGRESS NOTES
PROGRESS NOTE    Subjective:       Patient ID: Melissa Sood is a 73 y.o. female.    Breast Cancer:  Dx: 2003  T2N1M0 stage II  neoadj AC-T, surgery-XRT Arimidex-Femara till 2/2011  BRCA 1/2 neg  ER pos, Her 2 neg  Recurrence 3/2018  Started Ibrance/Exemestane 4/30/2018.     Chief Complaint:  Follow-up and Results  breast cancer follow up.      History of Present Illness:   Melissa Sood is a 73 y.o. female who presents for follow up after 1.5 years.  Patient is doing well.  Now on Ibrance and exemestane since 4/30/2018.   Patient is tolerating this well.     Patient complains of increasing depression and has started on lexapro daily.      Family and Social history reviewed and is unchanged from 9/22/2014      ROS:  Review of Systems   Constitutional: Negative for fever.   Respiratory: Negative for shortness of breath.    Cardiovascular: Negative for chest pain and leg swelling.   Gastrointestinal: Negative for abdominal pain and blood in stool.   Genitourinary: Negative for hematuria.   Skin: Negative for rash.          Current Outpatient Medications:     ALPRAZolam (XANAX) 0.25 MG tablet, TAKE 1 TABLET BY MOUTH THREE TIMES DAILY AS NEEDED FOR ANXIETY, Disp: 30 tablet, Rfl: 0    aspirin (ECOTRIN) 81 MG EC tablet, Take 81 mg by mouth once daily., Disp: , Rfl:     atorvastatin (LIPITOR) 20 MG tablet, TAKE 1 TABLET BY MOUTH ONCE DAILY, Disp: 90 tablet, Rfl: 1    cyanocobalamin 1,000 mcg/mL injection, ADM 1 ML IM Q WK, Disp: , Rfl: 6    donepezil (ARICEPT) 10 MG tablet, Take 1 tablet by mouth every evening., Disp: , Rfl:     escitalopram oxalate (LEXAPRO) 20 MG tablet, TAKE 1 TABLET BY MOUTH EVERY DAY, Disp: 90 tablet, Rfl: 0    exemestane (AROMASIN) 25 mg tablet, TAKE 1 TABLET BY MOUTH EVERY DAY, Disp: 30 tablet, Rfl: 0    IBRANCE 125 mg Cap, TAKE 1 CAPSULE (125MG) BY MOUTH DAILY FOR 21 DAYS THEN 7 DAYS OFF, Disp: 21 capsule, Rfl: 5    memantine (NAMENDA  XR) 28 mg CSpX, memantine 28 mg capsule sprinkle,extended release 24hr  TAKE 1 CAPSULE BY MOUTH EVERY DAY, Disp: , Rfl:     metFORMIN (GLUCOPHAGE) 500 MG tablet, TAKE 1 TABLET BY MOUTH EVERY DAY WITH A MEAL, Disp: 90 tablet, Rfl: 0        Objective:       Physical Examination:     /66   Pulse 66   Temp 97.9 °F (36.6 °C)   Resp 20   Wt 66 kg (145 lb 8 oz)   BMI 26.61 kg/m²     Physical Exam   Constitutional: She appears well-developed and well-nourished.   HENT:   Head: Normocephalic and atraumatic.   Right Ear: External ear normal.   Left Ear: External ear normal.   Mouth/Throat: Oropharynx is clear and moist.   Eyes: Pupils are equal, round, and reactive to light. Conjunctivae are normal.   Neck: No tracheal deviation present. No thyromegaly present.   Cardiovascular: Normal rate, regular rhythm and normal heart sounds.   Pulmonary/Chest: Effort normal and breath sounds normal.   Abdominal: Soft. Bowel sounds are normal. She exhibits no distension and no mass. There is no tenderness.   Musculoskeletal: She exhibits no edema.   Neurological:   Neuro intact througout   Skin: No rash noted.   Psychiatric: She has a normal mood and affect. Her behavior is normal. Judgment and thought content normal.       Labs:   No results found for this or any previous visit (from the past 336 hour(s)).  CMP  Sodium   Date Value Ref Range Status   05/08/2019 139 136 - 145 mmol/L Final   03/18/2019 139 134 - 144 mmol/L      Potassium   Date Value Ref Range Status   05/08/2019 3.8 3.5 - 5.1 mmol/L Final     Chloride   Date Value Ref Range Status   05/08/2019 104 101 - 111 mmol/L Final   03/18/2019 104 98 - 110 mmol/L      CO2   Date Value Ref Range Status   05/08/2019 27 23 - 29 mmol/L Final     Glucose   Date Value Ref Range Status   05/08/2019 144 (H) 74 - 118 mg/dL Final   03/18/2019 95 70 - 99 mg/dL      BUN, Bld   Date Value Ref Range Status   05/08/2019 15 8 - 23 mg/dL Final     Creatinine   Date Value Ref Range  Status   05/08/2019 0.8 0.5 - 1.4 mg/dL Final   03/18/2019 0.75 0.60 - 1.40 mg/dL      Calcium   Date Value Ref Range Status   05/08/2019 9.2 8.6 - 10.0 mg/dL Final     Total Protein   Date Value Ref Range Status   05/08/2019 7.3 6.0 - 8.4 g/dL Final     Albumin   Date Value Ref Range Status   05/08/2019 4.0 3.5 - 5.2 g/dL Final   03/18/2019 4.2 3.1 - 4.7 g/dL      Total Bilirubin   Date Value Ref Range Status   05/08/2019 0.9 0.3 - 1.2 mg/dL Final     Comment:     For infants and newborns, interpretation of results should be based  on gestational age, weight and in agreement with clinical  observations.  Premature Infant recommended reference ranges:  Up to 24 hours.............<8.0 mg/dL  Up to 48 hours............<12.0 mg/dL  3-5 days..................<15.0 mg/dL  6-29 days.................<15.0 mg/dL       Alkaline Phosphatase   Date Value Ref Range Status   05/08/2019 64 38 - 126 U/L Final     AST   Date Value Ref Range Status   05/08/2019 20 15 - 41 U/L Final     ALT   Date Value Ref Range Status   05/08/2019 14 14 - 54 U/L Final     Anion Gap   Date Value Ref Range Status   05/08/2019 8 8 - 16 mmol/L Final     eGFR if    Date Value Ref Range Status   05/08/2019 >60.0 >60 mL/min/1.73 m^2 Final     eGFR if non    Date Value Ref Range Status   05/08/2019 >60.0 >60 mL/min/1.73 m^2 Final     Comment:     Calculation used to obtain the estimated glomerular filtration  rate (eGFR) is the CKD-EPI equation.        Lab Results   Component Value Date    CEA 1.8 07/19/2018     No results found for: PSA        Assessment/Plan:     Problem List Items Addressed This Visit     Recurrent malignant neoplasm of right breast (Chronic)     This has been working very well for the cancer and her tumor markers were stable in March of this year and will recheck these now.  Will continue the Ibrance and exemestane.           Relevant Orders    Cancer antigen 15-3    Cancer antigen 27.29    CEA    CBC  auto differential    Comprehensive metabolic panel    Alzheimer's disease     Patient is being treated by Dr. Steven and will ask him to review her medications to see if there is something that can be added.                 Discussion:   High complexity, high risk medications.   Follow up in about 3 months (around 9/24/2019).      Electronically signed by Rudolph Bryan

## 2019-06-24 NOTE — ASSESSMENT & PLAN NOTE
Patient is being treated by Dr. Steven and will ask him to review her medications to see if there is something that can be added.

## 2019-06-24 NOTE — LETTER
June 24, 2019      Cameron Lopez MD  [address]  [address]  [facility] [address]           [facility]  [address]  [address]  [facility] [address]  Phone: [phone]  Fax: [phone]          Patient: Melissa Sood   MR Number: [id number]   YOB: 1945   Date of Visit: 6/24/2019       Dear Dr. Cameron Lopez:    Thank you for referring Melissa Sood to me for evaluation. Attached you will find relevant portions of my assessment and plan of care.    If you have questions, please do not hesitate to call me. I look forward to following Melissa Sood along with you.    Sincerely,    Rudolph Barton MD    Enclosure  CC:  No Recipients    If you would like to receive this communication electronically, please contact [email or web address] or [phone] to request more information on [employer] access.    For providers and/or their staff who would like to refer a patient to [facility], please contact us through our one-stop-shop provider referral line, [facility], at [phone].    If you feel you have received this communication in error or would no longer like to receive these types of communications, please e-mail [email or web address]

## 2019-06-25 LAB
CA 27.29: 62.5 U/ML (ref 0–38.6)
CANCER AG15-3 SERPL-ACNC: 42.9 U/ML (ref 0–25)

## 2019-07-11 DIAGNOSIS — Z85.3 PERSONAL HISTORY OF MALIGNANT NEOPLASM OF BREAST: ICD-10-CM

## 2019-07-12 RX ORDER — EXEMESTANE 25 MG/1
TABLET ORAL
Qty: 30 TABLET | Refills: 0 | Status: SHIPPED | OUTPATIENT
Start: 2019-07-12 | End: 2019-08-08 | Stop reason: SDUPTHER

## 2019-08-08 DIAGNOSIS — Z85.3 PERSONAL HISTORY OF MALIGNANT NEOPLASM OF BREAST: ICD-10-CM

## 2019-08-08 RX ORDER — EXEMESTANE 25 MG/1
TABLET ORAL
Qty: 30 TABLET | Refills: 0 | Status: SHIPPED | OUTPATIENT
Start: 2019-08-08 | End: 2019-09-08 | Stop reason: SDUPTHER

## 2019-08-14 RX ORDER — ATORVASTATIN CALCIUM 20 MG/1
TABLET, FILM COATED ORAL
Qty: 90 TABLET | Refills: 3 | Status: SHIPPED | OUTPATIENT
Start: 2019-08-14 | End: 2020-08-13

## 2019-09-08 DIAGNOSIS — Z85.3 PERSONAL HISTORY OF MALIGNANT NEOPLASM OF BREAST: ICD-10-CM

## 2019-09-09 RX ORDER — EXEMESTANE 25 MG/1
TABLET ORAL
Qty: 30 TABLET | Refills: 0 | Status: SHIPPED | OUTPATIENT
Start: 2019-09-09 | End: 2019-10-10 | Stop reason: SDUPTHER

## 2019-09-10 RX ORDER — METFORMIN HYDROCHLORIDE 500 MG/1
TABLET ORAL
Qty: 90 TABLET | Refills: 0 | Status: SHIPPED | OUTPATIENT
Start: 2019-09-10 | End: 2019-12-10 | Stop reason: SDUPTHER

## 2019-09-16 ENCOUNTER — OFFICE VISIT (OUTPATIENT)
Dept: HEMATOLOGY/ONCOLOGY | Facility: CLINIC | Age: 74
End: 2019-09-16
Payer: MEDICARE

## 2019-09-16 ENCOUNTER — LAB VISIT (OUTPATIENT)
Dept: LAB | Facility: HOSPITAL | Age: 74
End: 2019-09-16
Attending: INTERNAL MEDICINE
Payer: MEDICARE

## 2019-09-16 VITALS
TEMPERATURE: 98 F | RESPIRATION RATE: 18 BRPM | DIASTOLIC BLOOD PRESSURE: 67 MMHG | SYSTOLIC BLOOD PRESSURE: 111 MMHG | HEART RATE: 71 BPM | WEIGHT: 146.5 LBS | BODY MASS INDEX: 26.8 KG/M2

## 2019-09-16 DIAGNOSIS — F02.80 ALZHEIMER'S DEMENTIA WITHOUT BEHAVIORAL DISTURBANCE, UNSPECIFIED TIMING OF DEMENTIA ONSET: ICD-10-CM

## 2019-09-16 DIAGNOSIS — C50.911 RECURRENT MALIGNANT NEOPLASM OF RIGHT BREAST: Chronic | ICD-10-CM

## 2019-09-16 DIAGNOSIS — F41.9 ANXIETY: ICD-10-CM

## 2019-09-16 DIAGNOSIS — G30.9 ALZHEIMER'S DEMENTIA WITHOUT BEHAVIORAL DISTURBANCE, UNSPECIFIED TIMING OF DEMENTIA ONSET: ICD-10-CM

## 2019-09-16 DIAGNOSIS — C50.411 MALIGNANT NEOPLASM OF UPPER-OUTER QUADRANT OF RIGHT BREAST IN FEMALE, ESTROGEN RECEPTOR POSITIVE: Primary | ICD-10-CM

## 2019-09-16 DIAGNOSIS — Z17.0 MALIGNANT NEOPLASM OF UPPER-OUTER QUADRANT OF RIGHT BREAST IN FEMALE, ESTROGEN RECEPTOR POSITIVE: Primary | ICD-10-CM

## 2019-09-16 LAB
ALBUMIN SERPL BCP-MCNC: 4.4 G/DL (ref 3.5–5.2)
ALP SERPL-CCNC: 57 U/L (ref 55–135)
ALT SERPL W/O P-5'-P-CCNC: 17 U/L (ref 10–44)
ANION GAP SERPL CALC-SCNC: 7 MMOL/L (ref 8–16)
AST SERPL-CCNC: 21 U/L (ref 10–40)
BASOPHILS # BLD AUTO: 0.07 K/UL (ref 0–0.2)
BASOPHILS NFR BLD: 2.6 % (ref 0–1.9)
BILIRUB SERPL-MCNC: 0.7 MG/DL (ref 0.1–1)
BUN SERPL-MCNC: 18 MG/DL (ref 8–23)
CALCIUM SERPL-MCNC: 9.3 MG/DL (ref 8.7–10.5)
CHLORIDE SERPL-SCNC: 103 MMOL/L (ref 95–110)
CO2 SERPL-SCNC: 29 MMOL/L (ref 23–29)
CREAT SERPL-MCNC: 0.8 MG/DL (ref 0.5–1.4)
DIFFERENTIAL METHOD: ABNORMAL
EOSINOPHIL # BLD AUTO: 0 K/UL (ref 0–0.5)
EOSINOPHIL NFR BLD: 1.5 % (ref 0–8)
ERYTHROCYTE [DISTWIDTH] IN BLOOD BY AUTOMATED COUNT: 14 % (ref 11.5–14.5)
EST. GFR  (AFRICAN AMERICAN): >60 ML/MIN/1.73 M^2
EST. GFR  (NON AFRICAN AMERICAN): >60 ML/MIN/1.73 M^2
GLUCOSE SERPL-MCNC: 85 MG/DL (ref 70–110)
HCT VFR BLD AUTO: 34.3 % (ref 37–48.5)
HGB BLD-MCNC: 11.3 G/DL (ref 12–16)
IMM GRANULOCYTES # BLD AUTO: 0.01 K/UL (ref 0–0.04)
IMM GRANULOCYTES NFR BLD AUTO: 0.4 % (ref 0–0.5)
LYMPHOCYTES # BLD AUTO: 0.9 K/UL (ref 1–4.8)
LYMPHOCYTES NFR BLD: 32.7 % (ref 18–48)
MCH RBC QN AUTO: 36 PG (ref 27–31)
MCHC RBC AUTO-ENTMCNC: 32.9 G/DL (ref 32–36)
MCV RBC AUTO: 109 FL (ref 82–98)
MONOCYTES # BLD AUTO: 0.2 K/UL (ref 0.3–1)
MONOCYTES NFR BLD: 7 % (ref 4–15)
NEUTROPHILS # BLD AUTO: 1.5 K/UL (ref 1.8–7.7)
NEUTROPHILS NFR BLD: 55.8 % (ref 38–73)
NRBC BLD-RTO: 0 /100 WBC
PLATELET # BLD AUTO: 113 K/UL (ref 150–350)
PMV BLD AUTO: 9.9 FL (ref 9.2–12.9)
POTASSIUM SERPL-SCNC: 4 MMOL/L (ref 3.5–5.1)
PROT SERPL-MCNC: 7.4 G/DL (ref 6–8.4)
RBC # BLD AUTO: 3.14 M/UL (ref 4–5.4)
SODIUM SERPL-SCNC: 139 MMOL/L (ref 136–145)
WBC # BLD AUTO: 2.72 K/UL (ref 3.9–12.7)

## 2019-09-16 PROCEDURE — 1101F PT FALLS ASSESS-DOCD LE1/YR: CPT | Mod: S$GLB,,, | Performed by: INTERNAL MEDICINE

## 2019-09-16 PROCEDURE — 1101F PR PT FALLS ASSESS DOC 0-1 FALLS W/OUT INJ PAST YR: ICD-10-PCS | Mod: S$GLB,,, | Performed by: INTERNAL MEDICINE

## 2019-09-16 PROCEDURE — 85025 COMPLETE CBC W/AUTO DIFF WBC: CPT

## 2019-09-16 PROCEDURE — 86300 IMMUNOASSAY TUMOR CA 15-3: CPT | Mod: 91

## 2019-09-16 PROCEDURE — 86300 IMMUNOASSAY TUMOR CA 15-3: CPT

## 2019-09-16 PROCEDURE — 99214 PR OFFICE/OUTPT VISIT, EST, LEVL IV, 30-39 MIN: ICD-10-PCS | Mod: S$GLB,,, | Performed by: INTERNAL MEDICINE

## 2019-09-16 PROCEDURE — 99214 OFFICE O/P EST MOD 30 MIN: CPT | Mod: S$GLB,,, | Performed by: INTERNAL MEDICINE

## 2019-09-16 PROCEDURE — 80053 COMPREHEN METABOLIC PANEL: CPT

## 2019-09-16 RX ORDER — SERTRALINE HYDROCHLORIDE 50 MG/1
TABLET, FILM COATED ORAL
COMMUNITY

## 2019-09-16 NOTE — ASSESSMENT & PLAN NOTE
Patient seems to be doing ok from this standpoint.  Will continue to monitor, continue current treatment.

## 2019-09-16 NOTE — ASSESSMENT & PLAN NOTE
Patient seems stable at this time.  Will continue to follow this for now and will see neurology in the next month.

## 2019-09-16 NOTE — PROGRESS NOTES
PROGRESS NOTE    Subjective:       Patient ID: Melissa Sood is a 74 y.o. female.    Breast Cancer:  Dx: 2003  T2N1M0 stage II  neoadj AC-T, surgery-XRT Arimidex-Femara till 2/2011  BRCA 1/2 neg  ER pos, Her 2 neg  Recurrence 3/2018  Started Ibrance/Exemestane 4/30/2018.     Chief Complaint:  Follow-up and Results  breast cancer follow up.      History of Present Illness:   Melissa Sood is a 74 y.o. female who presents for follow up after 1.5 years.  Patient is doing well.  Now on Ibrance and exemestane since 4/30/2018.   Patient is tolerating this well.     Patient complains of increasing depression and has started on lexapro daily.      Labs 6/24/2019:  WBC 2.5(ANC 1.3)  Hb 11.6  Plt: 110--228  CMP unremark  CA 15-3:  42.9--44--55  CA 27.29: 62.5--68--69      PET scan impression 3/15/2019:  IMPRESSION: Stable sclerotic foci throughout the axial skeleton with no FDG  activity compatible with treated metastasis    No evidence of recurrent or new metastatic disease    Prior cholecystectomy and hysterectomy    Family and Social history reviewed and is unchanged from 9/22/2014      ROS:  Review of Systems   Constitutional: Negative for fever.   Respiratory: Negative for shortness of breath.    Cardiovascular: Negative for chest pain and leg swelling.   Gastrointestinal: Negative for abdominal pain and blood in stool.   Genitourinary: Negative for hematuria.   Skin: Negative for rash.          Current Outpatient Medications:     ALPRAZolam (XANAX) 0.25 MG tablet, TAKE 1 TABLET BY MOUTH THREE TIMES DAILY AS NEEDED FOR ANXIETY, Disp: 30 tablet, Rfl: 0    aspirin (ECOTRIN) 81 MG EC tablet, Take 81 mg by mouth once daily., Disp: , Rfl:     atorvastatin (LIPITOR) 20 MG tablet, TAKE 1 TABLET BY MOUTH ONCE DAILY, Disp: 90 tablet, Rfl: 3    donepezil (ARICEPT) 10 MG tablet, Take 1 tablet by mouth every evening., Disp: , Rfl:     exemestane (AROMASIN) 25 mg tablet,  TAKE 1 TABLET BY MOUTH EVERY DAY, Disp: 30 tablet, Rfl: 0    IBRANCE 125 mg Cap, TAKE 1 CAPSULE (125MG) BY MOUTH DAILY FOR 21 DAYS THEN 7 DAYS OFF, Disp: 21 capsule, Rfl: 5    memantine (NAMENDA XR) 28 mg CSpX, memantine 28 mg capsule sprinkle,extended release 24hr  TAKE 1 CAPSULE BY MOUTH EVERY DAY, Disp: , Rfl:     metFORMIN (GLUCOPHAGE) 500 MG tablet, TAKE 1 TABLET BY MOUTH EVERY DAY WITH A MEAL, Disp: 90 tablet, Rfl: 0    sertraline (ZOLOFT) 50 MG tablet, sertraline 50 mg tablet  TK 1 T PO QD FOR 2 WKS THEN TK 1 T PO BID, Disp: , Rfl:     cyanocobalamin 1,000 mcg/mL injection, ADM 1 ML IM Q WK, Disp: , Rfl: 6    escitalopram oxalate (LEXAPRO) 20 MG tablet, TAKE 1 TABLET BY MOUTH EVERY DAY, Disp: 90 tablet, Rfl: 0        Objective:       Physical Examination:     /67   Pulse 71   Temp 97.8 °F (36.6 °C)   Resp 18   Wt 66.5 kg (146 lb 8 oz)   BMI 26.80 kg/m²     Physical Exam   Constitutional: She appears well-developed and well-nourished.   HENT:   Head: Normocephalic and atraumatic.   Right Ear: External ear normal.   Left Ear: External ear normal.   Mouth/Throat: Oropharynx is clear and moist.   Eyes: Pupils are equal, round, and reactive to light. Conjunctivae are normal.   Neck: No tracheal deviation present. No thyromegaly present.   Cardiovascular: Normal rate, regular rhythm and normal heart sounds.   Pulmonary/Chest: Effort normal and breath sounds normal.   Abdominal: Soft. Bowel sounds are normal. She exhibits no distension and no mass. There is no tenderness.   Musculoskeletal: She exhibits no edema.   Neurological:   Neuro intact througout   Skin: No rash noted.   Psychiatric: She has a normal mood and affect. Her behavior is normal. Judgment and thought content normal.       Labs:   No results found for this or any previous visit (from the past 336 hour(s)).  CMP  Sodium   Date Value Ref Range Status   06/24/2019 141 134 - 144 mmol/L      Potassium   Date Value Ref Range Status    06/24/2019 3.8 3.5 - 5.0 mmol/L      Chloride   Date Value Ref Range Status   06/24/2019 103 98 - 110 mmol/L      CO2   Date Value Ref Range Status   06/24/2019 29.7 22.8 - 31.6 mmol/L      Glucose   Date Value Ref Range Status   06/24/2019 87 70 - 99 mg/dL      BUN, Bld   Date Value Ref Range Status   06/24/2019 17 8 - 20 mg/dL      Creatinine   Date Value Ref Range Status   06/24/2019 0.82 0.60 - 1.40 mg/dL      Calcium   Date Value Ref Range Status   06/24/2019 9.1 7.7 - 10.4 mg/dL      Total Protein   Date Value Ref Range Status   06/24/2019 7.4 6.0 - 8.2 g/dL      Albumin   Date Value Ref Range Status   06/24/2019 4.3 3.1 - 4.7 g/dL      Total Bilirubin   Date Value Ref Range Status   06/24/2019 0.9 0.3 - 1.0 mg/dL      Alkaline Phosphatase   Date Value Ref Range Status   06/24/2019 60 40 - 104 IU/L      AST   Date Value Ref Range Status   06/24/2019 20 10 - 40 IU/L      ALT   Date Value Ref Range Status   05/08/2019 14 14 - 54 U/L Final     Anion Gap   Date Value Ref Range Status   05/08/2019 8 8 - 16 mmol/L Final     eGFR if    Date Value Ref Range Status   05/08/2019 >60.0 >60 mL/min/1.73 m^2 Final     eGFR if non    Date Value Ref Range Status   05/08/2019 >60.0 >60 mL/min/1.73 m^2 Final     Comment:     Calculation used to obtain the estimated glomerular filtration  rate (eGFR) is the CKD-EPI equation.        Lab Results   Component Value Date    CEA 2.6 06/24/2019     No results found for: PSA        Assessment/Plan:     Problem List Items Addressed This Visit     Recurrent malignant neoplasm of right breast (Chronic)     Patient seems to be doing well and her tumor markers continue to slowly decline in number.  Her PET was improved back in March of this year and will check this again prior to next visit.  Will check labs now as well and discussed this today.          Relevant Orders    CBC auto differential    Comprehensive metabolic panel    NM PET CT Routine Skull to  Mid Thigh    Cancer antigen 15-3    Cancer antigen 27.29    Malignant neoplasm of upper-outer quadrant of right breast in female, estrogen receptor positive - Primary    Relevant Orders    NM PET CT Routine Skull to Mid Thigh    Anxiety     Patient seems to be doing ok from this standpoint.  Will continue to monitor, continue current treatment.          Alzheimer's disease     Patient seems stable at this time.  Will continue to follow this for now and will see neurology in the next month.                 Discussion:   High complexity, high risk medications.   Follow up in about 3 months (around 12/16/2019).      Electronically signed by Rudolph Bryan

## 2019-09-16 NOTE — ASSESSMENT & PLAN NOTE
Patient seems to be doing well and her tumor markers continue to slowly decline in number.  Her PET was improved back in March of this year and will check this again prior to next visit.  Will check labs now as well and discussed this today.

## 2019-09-17 LAB
CANCER AG15-3 SERPL-ACNC: 41.5 U/ML (ref 0–25)
CANCER AG27-29 SERPL-ACNC: 69.8 U/ML (ref 0–38.6)

## 2019-10-10 DIAGNOSIS — Z85.3 PERSONAL HISTORY OF MALIGNANT NEOPLASM OF BREAST: ICD-10-CM

## 2019-10-13 RX ORDER — EXEMESTANE 25 MG/1
TABLET ORAL
Qty: 30 TABLET | Refills: 0 | Status: SHIPPED | OUTPATIENT
Start: 2019-10-13 | End: 2019-11-11 | Stop reason: SDUPTHER

## 2019-10-22 RX ORDER — ESCITALOPRAM OXALATE 20 MG/1
TABLET ORAL
Qty: 90 TABLET | Refills: 0 | Status: SHIPPED | OUTPATIENT
Start: 2019-10-22 | End: 2020-09-08 | Stop reason: SDUPTHER

## 2019-11-11 DIAGNOSIS — Z85.3 PERSONAL HISTORY OF MALIGNANT NEOPLASM OF BREAST: ICD-10-CM

## 2019-11-11 RX ORDER — EXEMESTANE 25 MG/1
TABLET ORAL
Qty: 30 TABLET | Refills: 0 | Status: SHIPPED | OUTPATIENT
Start: 2019-11-11 | End: 2019-12-14 | Stop reason: SDUPTHER

## 2019-11-29 NOTE — TELEPHONE ENCOUNTER
One refill approved, but patient needs follow up appointment. Please call to schedule.  
Patients daughter, Britney, notified and states understanding. appointment made   
PROVIDER:[TOKEN:[[id number]]]

## 2019-12-10 RX ORDER — METFORMIN HYDROCHLORIDE 500 MG/1
TABLET ORAL
Qty: 90 TABLET | Refills: 0 | Status: SHIPPED | OUTPATIENT
Start: 2019-12-10 | End: 2020-03-12

## 2019-12-13 ENCOUNTER — HOSPITAL ENCOUNTER (OUTPATIENT)
Dept: RADIOLOGY | Facility: HOSPITAL | Age: 74
Discharge: HOME | End: 2019-12-13
Attending: INTERNAL MEDICINE
Payer: MEDICARE

## 2019-12-13 VITALS — WEIGHT: 146 LBS | BODY MASS INDEX: 27.56 KG/M2 | HEIGHT: 61 IN

## 2019-12-13 DIAGNOSIS — Z17.0 MALIGNANT NEOPLASM OF UPPER-OUTER QUADRANT OF RIGHT BREAST IN FEMALE, ESTROGEN RECEPTOR POSITIVE: ICD-10-CM

## 2019-12-13 DIAGNOSIS — C50.911 RECURRENT MALIGNANT NEOPLASM OF RIGHT BREAST: ICD-10-CM

## 2019-12-13 DIAGNOSIS — C50.411 MALIGNANT NEOPLASM OF UPPER-OUTER QUADRANT OF RIGHT BREAST IN FEMALE, ESTROGEN RECEPTOR POSITIVE: ICD-10-CM

## 2019-12-13 LAB — GLUCOSE SERPL-MCNC: 114 MG/DL (ref 70–110)

## 2019-12-13 PROCEDURE — A9552 F18 FDG: HCPCS | Mod: PO

## 2019-12-13 PROCEDURE — 78815 PET IMAGE W/CT SKULL-THIGH: CPT | Mod: TC,PO

## 2019-12-14 DIAGNOSIS — Z85.3 PERSONAL HISTORY OF MALIGNANT NEOPLASM OF BREAST: ICD-10-CM

## 2019-12-16 RX ORDER — EXEMESTANE 25 MG/1
TABLET ORAL
Qty: 30 TABLET | Refills: 0 | Status: SHIPPED | OUTPATIENT
Start: 2019-12-16 | End: 2020-01-13

## 2019-12-18 ENCOUNTER — OFFICE VISIT (OUTPATIENT)
Dept: HEMATOLOGY/ONCOLOGY | Facility: CLINIC | Age: 74
End: 2019-12-18
Payer: MEDICARE

## 2019-12-18 VITALS
WEIGHT: 147.88 LBS | SYSTOLIC BLOOD PRESSURE: 133 MMHG | HEART RATE: 85 BPM | RESPIRATION RATE: 18 BRPM | TEMPERATURE: 98 F | BODY MASS INDEX: 27.95 KG/M2 | DIASTOLIC BLOOD PRESSURE: 65 MMHG

## 2019-12-18 DIAGNOSIS — Z17.0 MALIGNANT NEOPLASM OF UPPER-OUTER QUADRANT OF RIGHT BREAST IN FEMALE, ESTROGEN RECEPTOR POSITIVE: Primary | ICD-10-CM

## 2019-12-18 DIAGNOSIS — C50.911 RECURRENT MALIGNANT NEOPLASM OF RIGHT BREAST: Chronic | ICD-10-CM

## 2019-12-18 DIAGNOSIS — C50.411 MALIGNANT NEOPLASM OF UPPER-OUTER QUADRANT OF RIGHT BREAST IN FEMALE, ESTROGEN RECEPTOR POSITIVE: Primary | ICD-10-CM

## 2019-12-18 PROCEDURE — 1101F PR PT FALLS ASSESS DOC 0-1 FALLS W/OUT INJ PAST YR: ICD-10-PCS | Mod: S$GLB,,, | Performed by: INTERNAL MEDICINE

## 2019-12-18 PROCEDURE — 99214 PR OFFICE/OUTPT VISIT, EST, LEVL IV, 30-39 MIN: ICD-10-PCS | Mod: S$GLB,,, | Performed by: INTERNAL MEDICINE

## 2019-12-18 PROCEDURE — 99214 OFFICE O/P EST MOD 30 MIN: CPT | Mod: S$GLB,,, | Performed by: INTERNAL MEDICINE

## 2019-12-18 PROCEDURE — 1101F PT FALLS ASSESS-DOCD LE1/YR: CPT | Mod: S$GLB,,, | Performed by: INTERNAL MEDICINE

## 2019-12-18 PROCEDURE — 1159F PR MEDICATION LIST DOCUMENTED IN MEDICAL RECORD: ICD-10-PCS | Mod: S$GLB,,, | Performed by: INTERNAL MEDICINE

## 2019-12-18 PROCEDURE — 1159F MED LIST DOCD IN RCRD: CPT | Mod: S$GLB,,, | Performed by: INTERNAL MEDICINE

## 2019-12-18 NOTE — PROGRESS NOTES
PROGRESS NOTE    Subjective:       Patient ID: Melissa Sood is a 74 y.o. female.    Breast Cancer:  Dx: 2003  T2N1M0 stage II  neoadj AC-T, surgery-XRT Arimidex-Femara till 2/2011  BRCA 1/2 neg  ER pos, Her 2 neg  Recurrence 3/2018  Started Ibrance/Exemestane 4/30/2018.     Chief Complaint:  Follow-up and Results  breast cancer follow up.      History of Present Illness:   Melissa Sood is a 74 y.o. female who presents for follow up after 1.5 years.  Patient is doing well.  Now on Ibrance and exemestane since 4/30/2018.   Patient is tolerating this well.     PIK3CA--path 4/2/2018    PET impression 12/13/2019:  1. Two new small hypermetabolic masses in the right supraclavicular region and in the superior mediastinum, suspicious for new sites of metastatic disease.  2. No additional evidence of active metastatic disease.  3. Stable diffuse osseous metastases without FDG hypermetabolism, compatible with treated or quiescent disease.    Labs 6/24/2019:  WBC 2.5(ANC 1.3)  Hb 11.6  Plt: 110--228  CMP unremark  CA 15-3:  42.9--44--55  CA 27.29: 62.5--68--69      PET scan impression 3/15/2019:  IMPRESSION: Stable sclerotic foci throughout the axial skeleton with no FDG  activity compatible with treated metastasis    No evidence of recurrent or new metastatic disease    Prior cholecystectomy and hysterectomy    Family and Social history reviewed and is unchanged from 9/22/2014      ROS:  Review of Systems   Constitutional: Negative for fever.   Respiratory: Negative for shortness of breath.    Cardiovascular: Negative for chest pain and leg swelling.   Gastrointestinal: Negative for abdominal pain and blood in stool.   Genitourinary: Negative for hematuria.   Skin: Negative for rash.          Current Outpatient Medications:     ALPRAZolam (XANAX) 0.25 MG tablet, TAKE 1 TABLET BY MOUTH THREE TIMES DAILY AS NEEDED FOR ANXIETY, Disp: 30 tablet, Rfl: 0    aspirin  (ECOTRIN) 81 MG EC tablet, Take 81 mg by mouth once daily., Disp: , Rfl:     atorvastatin (LIPITOR) 20 MG tablet, TAKE 1 TABLET BY MOUTH ONCE DAILY, Disp: 90 tablet, Rfl: 3    cyanocobalamin 1,000 mcg/mL injection, ADM 1 ML IM Q WK, Disp: , Rfl: 6    donepezil (ARICEPT) 10 MG tablet, Take 1 tablet by mouth every evening., Disp: , Rfl:     escitalopram oxalate (LEXAPRO) 20 MG tablet, TAKE 1 TABLET BY MOUTH EVERY DAY, Disp: 90 tablet, Rfl: 0    escitalopram oxalate (LEXAPRO) 20 MG tablet, TAKE 1 TABLET BY MOUTH EVERY DAY, Disp: 90 tablet, Rfl: 0    exemestane (AROMASIN) 25 mg tablet, TAKE 1 TABLET BY MOUTH EVERY DAY, Disp: 30 tablet, Rfl: 0    IBRANCE 125 mg Cap, TAKE 1 CAPSULE (125MG) BY MOUTH DAILY FOR 21 DAYS THEN 7 DAYS OFF, Disp: 21 capsule, Rfl: 5    memantine (NAMENDA XR) 28 mg CSpX, memantine 28 mg capsule sprinkle,extended release 24hr  TAKE 1 CAPSULE BY MOUTH EVERY DAY, Disp: , Rfl:     metFORMIN (GLUCOPHAGE) 500 MG tablet, TAKE 1 TABLET BY MOUTH EVERY DAY WITH A MEAL, Disp: 90 tablet, Rfl: 0    sertraline (ZOLOFT) 50 MG tablet, sertraline 50 mg tablet  TK 1 T PO QD FOR 2 WKS THEN TK 1 T PO BID, Disp: , Rfl:         Objective:       Physical Examination:     /65   Pulse 85   Temp 98.2 °F (36.8 °C)   Resp 18   Wt 67.1 kg (147 lb 14.4 oz)   BMI 27.95 kg/m²     Physical Exam   Constitutional: She appears well-developed and well-nourished.   HENT:   Head: Normocephalic and atraumatic.   Right Ear: External ear normal.   Left Ear: External ear normal.   Mouth/Throat: Oropharynx is clear and moist.   Eyes: Pupils are equal, round, and reactive to light. Conjunctivae are normal.   Neck: No tracheal deviation present. No thyromegaly present.   Cardiovascular: Normal rate, regular rhythm and normal heart sounds.   Pulmonary/Chest: Effort normal and breath sounds normal.   Abdominal: Soft. Bowel sounds are normal. She exhibits no distension and no mass. There is no tenderness.   Musculoskeletal:  She exhibits no edema.   Neurological:   Neuro intact througout   Skin: No rash noted.   Psychiatric: She has a normal mood and affect. Her behavior is normal. Judgment and thought content normal.       Labs:   No results found for this or any previous visit (from the past 336 hour(s)).  CMP  Sodium   Date Value Ref Range Status   09/16/2019 139 136 - 145 mmol/L Final   06/24/2019 141 134 - 144 mmol/L      Potassium   Date Value Ref Range Status   09/16/2019 4.0 3.5 - 5.1 mmol/L Final     Chloride   Date Value Ref Range Status   09/16/2019 103 95 - 110 mmol/L Final   06/24/2019 103 98 - 110 mmol/L      CO2   Date Value Ref Range Status   09/16/2019 29 23 - 29 mmol/L Final     Glucose   Date Value Ref Range Status   09/16/2019 85 70 - 110 mg/dL Final   06/24/2019 87 70 - 99 mg/dL      BUN, Bld   Date Value Ref Range Status   09/16/2019 18 8 - 23 mg/dL Final     Creatinine   Date Value Ref Range Status   09/16/2019 0.8 0.5 - 1.4 mg/dL Final   06/24/2019 0.82 0.60 - 1.40 mg/dL      Calcium   Date Value Ref Range Status   09/16/2019 9.3 8.7 - 10.5 mg/dL Final     Total Protein   Date Value Ref Range Status   09/16/2019 7.4 6.0 - 8.4 g/dL Final     Albumin   Date Value Ref Range Status   09/16/2019 4.4 3.5 - 5.2 g/dL Final   06/24/2019 4.3 3.1 - 4.7 g/dL      Total Bilirubin   Date Value Ref Range Status   09/16/2019 0.7 0.1 - 1.0 mg/dL Final     Comment:     For infants and newborns, interpretation of results should be based  on gestational age, weight and in agreement with clinical  observations.  Premature Infant recommended reference ranges:  Up to 24 hours.............<8.0 mg/dL  Up to 48 hours............<12.0 mg/dL  3-5 days..................<15.0 mg/dL  6-29 days.................<15.0 mg/dL       Alkaline Phosphatase   Date Value Ref Range Status   09/16/2019 57 55 - 135 U/L Final     AST   Date Value Ref Range Status   09/16/2019 21 10 - 40 U/L Final     ALT   Date Value Ref Range Status   09/16/2019 17 10 - 44  U/L Final     Anion Gap   Date Value Ref Range Status   09/16/2019 7 (L) 8 - 16 mmol/L Final     eGFR if    Date Value Ref Range Status   09/16/2019 >60.0 >60 mL/min/1.73 m^2 Final     eGFR if non    Date Value Ref Range Status   09/16/2019 >60.0 >60 mL/min/1.73 m^2 Final     Comment:     Calculation used to obtain the estimated glomerular filtration  rate (eGFR) is the CKD-EPI equation.        Lab Results   Component Value Date    CEA 2.6 06/24/2019     No results found for: PSA        Assessment/Plan:     Problem List Items Addressed This Visit     Recurrent malignant neoplasm of right breast (Chronic)     Patient's PET scan shows 2 very small abnormal areas in the mediastinum and right subclavicular.  At this point I'm not certain that this represents a clear recurrence.  Her tumor markers have been elevated for some time but have been stable.  Will check these again now and if there has been a sharp rise then I will change her therapy and discussed the possible use of apelisib.  If not PIK3CA positive then would consider affinitor or perhaps abemaciclib.  Will have her back with me in three months with scan otherwise.           Relevant Orders    Cancer antigen 15-3    Cancer antigen 27.29    NM PET CT Routine Skull to Mid Thigh    Malignant neoplasm of upper-outer quadrant of right breast in female, estrogen receptor positive - Primary    Relevant Orders    Cancer antigen 15-3    Cancer antigen 27.29    NM PET CT Routine Skull to Mid Thigh          Discussion:   High complexity, high risk medications.   No follow-ups on file.      Electronically signed by Rudolph Bryan

## 2019-12-18 NOTE — ASSESSMENT & PLAN NOTE
Patient's PET scan shows 2 very small abnormal areas in the mediastinum and right subclavicular.  At this point I'm not certain that this represents a clear recurrence.  Her tumor markers have been elevated for some time but have been stable.  Will check these again now and if there has been a sharp rise then I will change her therapy and discussed the possible use of apelisib.  If not PIK3CA positive then would consider affinitor or perhaps abemaciclib.  Will have her back with me in three months with scan otherwise.

## 2019-12-19 ENCOUNTER — LAB VISIT (OUTPATIENT)
Dept: LAB | Facility: HOSPITAL | Age: 74
End: 2019-12-19
Attending: INTERNAL MEDICINE
Payer: MEDICARE

## 2019-12-19 DIAGNOSIS — Z17.0 MALIGNANT NEOPLASM OF UPPER-OUTER QUADRANT OF RIGHT BREAST IN FEMALE, ESTROGEN RECEPTOR POSITIVE: ICD-10-CM

## 2019-12-19 DIAGNOSIS — C50.911 RECURRENT MALIGNANT NEOPLASM OF RIGHT BREAST: Chronic | ICD-10-CM

## 2019-12-19 DIAGNOSIS — C50.411 MALIGNANT NEOPLASM OF UPPER-OUTER QUADRANT OF RIGHT BREAST IN FEMALE, ESTROGEN RECEPTOR POSITIVE: ICD-10-CM

## 2019-12-19 PROCEDURE — 86300 IMMUNOASSAY TUMOR CA 15-3: CPT

## 2019-12-19 PROCEDURE — 86300 IMMUNOASSAY TUMOR CA 15-3: CPT | Mod: 91

## 2019-12-19 PROCEDURE — 36415 COLL VENOUS BLD VENIPUNCTURE: CPT

## 2019-12-20 LAB
CANCER AG15-3 SERPL-ACNC: 43.5 U/ML (ref 0–25)
CANCER AG27-29 SERPL-ACNC: 66.5 U/ML (ref 0–38.6)

## 2020-01-08 ENCOUNTER — PATIENT MESSAGE (OUTPATIENT)
Dept: HEMATOLOGY/ONCOLOGY | Facility: CLINIC | Age: 75
End: 2020-01-08

## 2020-01-09 DIAGNOSIS — Z85.3 PERSONAL HISTORY OF MALIGNANT NEOPLASM OF BREAST: ICD-10-CM

## 2020-01-10 RX ORDER — EXEMESTANE 25 MG/1
TABLET ORAL
OUTPATIENT
Start: 2020-01-10

## 2020-01-12 DIAGNOSIS — Z85.3 PERSONAL HISTORY OF MALIGNANT NEOPLASM OF BREAST: ICD-10-CM

## 2020-01-13 DIAGNOSIS — C50.911 RECURRENT MALIGNANT NEOPLASM OF RIGHT BREAST: Primary | Chronic | ICD-10-CM

## 2020-01-13 RX ORDER — EXEMESTANE 25 MG/1
TABLET ORAL
Qty: 30 TABLET | Refills: 11 | Status: SHIPPED | OUTPATIENT
Start: 2020-01-13 | End: 2021-01-04

## 2020-01-13 RX ORDER — HYDROCODONE BITARTRATE AND ACETAMINOPHEN 5; 325 MG/1; MG/1
1 TABLET ORAL EVERY 6 HOURS PRN
Qty: 30 TABLET | Refills: 0 | Status: SHIPPED | OUTPATIENT
Start: 2020-01-13 | End: 2020-01-23 | Stop reason: SDUPTHER

## 2020-01-23 DIAGNOSIS — C50.911 RECURRENT MALIGNANT NEOPLASM OF RIGHT BREAST: Chronic | ICD-10-CM

## 2020-01-23 RX ORDER — HYDROCODONE BITARTRATE AND ACETAMINOPHEN 5; 325 MG/1; MG/1
1 TABLET ORAL EVERY 6 HOURS PRN
Qty: 60 TABLET | Refills: 0 | Status: SHIPPED | OUTPATIENT
Start: 2020-01-23 | End: 2020-06-16 | Stop reason: SDUPTHER

## 2020-03-11 ENCOUNTER — HOSPITAL ENCOUNTER (OUTPATIENT)
Dept: RADIOLOGY | Facility: HOSPITAL | Age: 75
Discharge: HOME | End: 2020-03-11
Attending: INTERNAL MEDICINE
Payer: MEDICARE

## 2020-03-11 VITALS — WEIGHT: 135 LBS | BODY MASS INDEX: 24.84 KG/M2 | HEIGHT: 62 IN

## 2020-03-11 DIAGNOSIS — Z17.0 MALIGNANT NEOPLASM OF UPPER-OUTER QUADRANT OF RIGHT BREAST IN FEMALE, ESTROGEN RECEPTOR POSITIVE: ICD-10-CM

## 2020-03-11 DIAGNOSIS — C50.411 MALIGNANT NEOPLASM OF UPPER-OUTER QUADRANT OF RIGHT BREAST IN FEMALE, ESTROGEN RECEPTOR POSITIVE: ICD-10-CM

## 2020-03-11 DIAGNOSIS — C50.911 RECURRENT MALIGNANT NEOPLASM OF RIGHT BREAST: ICD-10-CM

## 2020-03-11 LAB — GLUCOSE SERPL-MCNC: 118 MG/DL (ref 70–110)

## 2020-03-11 PROCEDURE — A9552 F18 FDG: HCPCS | Mod: PO

## 2020-03-11 PROCEDURE — 78815 PET IMAGE W/CT SKULL-THIGH: CPT | Mod: TC,PO,PS

## 2020-03-12 ENCOUNTER — PATIENT MESSAGE (OUTPATIENT)
Dept: PRIMARY CARE CLINIC | Facility: CLINIC | Age: 75
End: 2020-03-12

## 2020-03-12 RX ORDER — METFORMIN HYDROCHLORIDE 500 MG/1
TABLET ORAL
Qty: 90 TABLET | Refills: 0 | Status: SHIPPED | OUTPATIENT
Start: 2020-03-12 | End: 2020-06-11

## 2020-03-16 ENCOUNTER — OFFICE VISIT (OUTPATIENT)
Dept: HEMATOLOGY/ONCOLOGY | Facility: CLINIC | Age: 75
End: 2020-03-16
Payer: MEDICARE

## 2020-03-16 ENCOUNTER — TELEPHONE (OUTPATIENT)
Dept: HEMATOLOGY/ONCOLOGY | Facility: CLINIC | Age: 75
End: 2020-03-16

## 2020-03-16 ENCOUNTER — LAB VISIT (OUTPATIENT)
Dept: LAB | Facility: HOSPITAL | Age: 75
End: 2020-03-16
Attending: INTERNAL MEDICINE
Payer: MEDICARE

## 2020-03-16 ENCOUNTER — TELEPHONE (OUTPATIENT)
Dept: PHARMACY | Facility: CLINIC | Age: 75
End: 2020-03-16

## 2020-03-16 VITALS
BODY MASS INDEX: 25.84 KG/M2 | TEMPERATURE: 98 F | SYSTOLIC BLOOD PRESSURE: 122 MMHG | WEIGHT: 141.31 LBS | RESPIRATION RATE: 18 BRPM | HEART RATE: 81 BPM | DIASTOLIC BLOOD PRESSURE: 70 MMHG

## 2020-03-16 DIAGNOSIS — R30.0 DYSURIA: ICD-10-CM

## 2020-03-16 DIAGNOSIS — C50.911 RECURRENT MALIGNANT NEOPLASM OF RIGHT BREAST: Primary | ICD-10-CM

## 2020-03-16 DIAGNOSIS — E11.69 TYPE 2 DIABETES MELLITUS WITH HYPERLIPIDEMIA: ICD-10-CM

## 2020-03-16 DIAGNOSIS — C50.911 RECURRENT MALIGNANT NEOPLASM OF RIGHT BREAST: Primary | Chronic | ICD-10-CM

## 2020-03-16 DIAGNOSIS — C50.911 RECURRENT MALIGNANT NEOPLASM OF RIGHT BREAST: ICD-10-CM

## 2020-03-16 DIAGNOSIS — E78.5 TYPE 2 DIABETES MELLITUS WITH HYPERLIPIDEMIA: ICD-10-CM

## 2020-03-16 LAB
BACTERIA #/AREA URNS HPF: ABNORMAL /HPF
BILIRUB UR QL STRIP: NEGATIVE
CLARITY UR: CLEAR
COLOR UR: YELLOW
GLUCOSE UR QL STRIP: NEGATIVE
HGB UR QL STRIP: ABNORMAL
HYALINE CASTS #/AREA URNS LPF: 8 /LPF
KETONES UR QL STRIP: NEGATIVE
LEUKOCYTE ESTERASE UR QL STRIP: ABNORMAL
MICROSCOPIC COMMENT: ABNORMAL
NITRITE UR QL STRIP: POSITIVE
PH UR STRIP: 6 [PH] (ref 5–8)
PROT UR QL STRIP: ABNORMAL
RBC #/AREA URNS HPF: 2 /HPF (ref 0–4)
SP GR UR STRIP: >1.03 (ref 1–1.03)
SQUAMOUS #/AREA URNS HPF: 4 /HPF
URN SPEC COLLECT METH UR: ABNORMAL
UROBILINOGEN UR STRIP-ACNC: NEGATIVE EU/DL
WBC #/AREA URNS HPF: 15 /HPF (ref 0–5)

## 2020-03-16 PROCEDURE — 1125F AMNT PAIN NOTED PAIN PRSNT: CPT | Mod: S$GLB,,, | Performed by: INTERNAL MEDICINE

## 2020-03-16 PROCEDURE — 87186 SC STD MICRODIL/AGAR DIL: CPT

## 2020-03-16 PROCEDURE — 1159F PR MEDICATION LIST DOCUMENTED IN MEDICAL RECORD: ICD-10-PCS | Mod: S$GLB,,, | Performed by: INTERNAL MEDICINE

## 2020-03-16 PROCEDURE — 87086 URINE CULTURE/COLONY COUNT: CPT

## 2020-03-16 PROCEDURE — 1101F PR PT FALLS ASSESS DOC 0-1 FALLS W/OUT INJ PAST YR: ICD-10-PCS | Mod: S$GLB,,, | Performed by: INTERNAL MEDICINE

## 2020-03-16 PROCEDURE — 1125F PR PAIN SEVERITY QUANTIFIED, PAIN PRESENT: ICD-10-PCS | Mod: S$GLB,,, | Performed by: INTERNAL MEDICINE

## 2020-03-16 PROCEDURE — 1101F PT FALLS ASSESS-DOCD LE1/YR: CPT | Mod: S$GLB,,, | Performed by: INTERNAL MEDICINE

## 2020-03-16 PROCEDURE — 81001 URINALYSIS AUTO W/SCOPE: CPT

## 2020-03-16 PROCEDURE — 99214 OFFICE O/P EST MOD 30 MIN: CPT | Mod: S$GLB,,, | Performed by: INTERNAL MEDICINE

## 2020-03-16 PROCEDURE — 1159F MED LIST DOCD IN RCRD: CPT | Mod: S$GLB,,, | Performed by: INTERNAL MEDICINE

## 2020-03-16 PROCEDURE — 87077 CULTURE AEROBIC IDENTIFY: CPT

## 2020-03-16 PROCEDURE — 99214 PR OFFICE/OUTPT VISIT, EST, LEVL IV, 30-39 MIN: ICD-10-PCS | Mod: S$GLB,,, | Performed by: INTERNAL MEDICINE

## 2020-03-16 NOTE — PROGRESS NOTES
PROGRESS NOTE    Subjective:       Patient ID: Melissa Sood is a 74 y.o. female.    Breast Cancer:  Dx: 2003  T2N1M0 stage II  neoadj AC-T, surgery-XRT Arimidex-Femara till 2/2011  BRCA 1/2 neg  ER pos, Her 2 neg  Recurrence 3/2018  Started Ibrance/Exemestane 4/30/2018.     Chief Complaint:  No chief complaint on file.  breast cancer follow up.      History of Present Illness:   Melissa Sood is a 74 y.o. female who presents for follow up after 1.5 years.  Patient is doing well.  Now on Ibrance and exemestane since 4/30/2018.   Patient is tolerating this well.     PIK3CA--path 4/2/2018    PET impression 12/13/2019:  1. Two new small hypermetabolic masses in the right supraclavicular region and in the superior mediastinum, suspicious for new sites of metastatic disease.  2. No additional evidence of active metastatic disease.  3. Stable diffuse osseous metastases without FDG hypermetabolism, compatible with treated or quiescent disease.    Labs 6/24/2019:  WBC 2.5(ANC 1.3)  Hb 11.6  Plt: 110--228  CMP unremark  CA 15-3:  42.9--44--55  CA 27.29: 62.5--68--69      PET scan impression 3/15/2019:  IMPRESSION: Stable sclerotic foci throughout the axial skeleton with no FDG  activity compatible with treated metastasis    No evidence of recurrent or new metastatic disease    Prior cholecystectomy and hysterectomy    Family and Social history reviewed and is unchanged from 9/22/2014      ROS:  Review of Systems   Constitutional: Negative for fever.   Respiratory: Negative for shortness of breath.    Cardiovascular: Negative for chest pain and leg swelling.   Gastrointestinal: Negative for abdominal pain and blood in stool.   Genitourinary: Negative for hematuria.   Skin: Negative for rash.          Current Outpatient Medications:     ALPRAZolam (XANAX) 0.25 MG tablet, TAKE 1 TABLET BY MOUTH THREE TIMES DAILY AS NEEDED FOR ANXIETY, Disp: 30 tablet, Rfl: 0    aspirin  (ECOTRIN) 81 MG EC tablet, Take 81 mg by mouth once daily., Disp: , Rfl:     atorvastatin (LIPITOR) 20 MG tablet, TAKE 1 TABLET BY MOUTH ONCE DAILY, Disp: 90 tablet, Rfl: 3    cyanocobalamin 1,000 mcg/mL injection, ADM 1 ML IM Q WK, Disp: , Rfl: 6    donepezil (ARICEPT) 10 MG tablet, Take 1 tablet by mouth every evening., Disp: , Rfl:     escitalopram oxalate (LEXAPRO) 20 MG tablet, TAKE 1 TABLET BY MOUTH EVERY DAY, Disp: 90 tablet, Rfl: 0    escitalopram oxalate (LEXAPRO) 20 MG tablet, TAKE 1 TABLET BY MOUTH EVERY DAY, Disp: 90 tablet, Rfl: 0    exemestane (AROMASIN) 25 mg tablet, TAKE 1 TABLET BY MOUTH EVERY DAY, Disp: 30 tablet, Rfl: 11    HYDROcodone-acetaminophen (NORCO) 5-325 mg per tablet, Take 1 tablet by mouth every 6 (six) hours as needed for Pain., Disp: 60 tablet, Rfl: 0    IBRANCE 125 mg Cap, TAKE 1 CAPSULE (125MG) BY MOUTH DAILY FOR 21 DAYS THEN 7 DAYS OFF, Disp: 21 capsule, Rfl: 5    memantine (NAMENDA XR) 28 mg CSpX, memantine 28 mg capsule sprinkle,extended release 24hr  TAKE 1 CAPSULE BY MOUTH EVERY DAY, Disp: , Rfl:     metFORMIN (GLUCOPHAGE) 500 MG tablet, TAKE 1 TABLET BY MOUTH EVERY DAY WITH A MEAL, Disp: 90 tablet, Rfl: 0    sertraline (ZOLOFT) 50 MG tablet, sertraline 50 mg tablet  TK 1 T PO QD FOR 2 WKS THEN TK 1 T PO BID, Disp: , Rfl:     alpelisib (PIQRAY) 300 mg/day (150 mg x 2) Tab, Take 300 mg by mouth once daily., Disp: 60 tablet, Rfl: 6        Objective:       Physical Examination:     /70   Pulse 81   Temp 97.8 °F (36.6 °C) (Oral)   Resp 18   Wt 64.1 kg (141 lb 4.8 oz)   BMI 25.84 kg/m²     Physical Exam   Constitutional: She appears well-developed and well-nourished.   HENT:   Head: Normocephalic and atraumatic.   Right Ear: External ear normal.   Left Ear: External ear normal.   Mouth/Throat: Oropharynx is clear and moist.   Eyes: Pupils are equal, round, and reactive to light. Conjunctivae are normal.   Neck: No tracheal deviation present. No thyromegaly  present.   Cardiovascular: Normal rate, regular rhythm and normal heart sounds.   Pulmonary/Chest: Effort normal and breath sounds normal.   Abdominal: Soft. Bowel sounds are normal. She exhibits no distension and no mass. There is no tenderness.   Musculoskeletal: She exhibits no edema.   Neurological:   Neuro intact througout   Skin: No rash noted.   Psychiatric: She has a normal mood and affect. Her behavior is normal. Judgment and thought content normal.       Labs:   No results found for this or any previous visit (from the past 336 hour(s)).  CMP  Sodium   Date Value Ref Range Status   09/16/2019 139 136 - 145 mmol/L Final   06/24/2019 141 134 - 144 mmol/L      Potassium   Date Value Ref Range Status   09/16/2019 4.0 3.5 - 5.1 mmol/L Final     Chloride   Date Value Ref Range Status   09/16/2019 103 95 - 110 mmol/L Final   06/24/2019 103 98 - 110 mmol/L      CO2   Date Value Ref Range Status   09/16/2019 29 23 - 29 mmol/L Final     Glucose   Date Value Ref Range Status   09/16/2019 85 70 - 110 mg/dL Final   06/24/2019 87 70 - 99 mg/dL      BUN, Bld   Date Value Ref Range Status   09/16/2019 18 8 - 23 mg/dL Final     Creatinine   Date Value Ref Range Status   09/16/2019 0.8 0.5 - 1.4 mg/dL Final   06/24/2019 0.82 0.60 - 1.40 mg/dL      Calcium   Date Value Ref Range Status   09/16/2019 9.3 8.7 - 10.5 mg/dL Final     Total Protein   Date Value Ref Range Status   09/16/2019 7.4 6.0 - 8.4 g/dL Final     Albumin   Date Value Ref Range Status   09/16/2019 4.4 3.5 - 5.2 g/dL Final   06/24/2019 4.3 3.1 - 4.7 g/dL      Total Bilirubin   Date Value Ref Range Status   09/16/2019 0.7 0.1 - 1.0 mg/dL Final     Comment:     For infants and newborns, interpretation of results should be based  on gestational age, weight and in agreement with clinical  observations.  Premature Infant recommended reference ranges:  Up to 24 hours.............<8.0 mg/dL  Up to 48 hours............<12.0 mg/dL  3-5 days..................<15.0  mg/dL  6-29 days.................<15.0 mg/dL       Alkaline Phosphatase   Date Value Ref Range Status   09/16/2019 57 55 - 135 U/L Final     AST   Date Value Ref Range Status   09/16/2019 21 10 - 40 U/L Final     ALT   Date Value Ref Range Status   09/16/2019 17 10 - 44 U/L Final     Anion Gap   Date Value Ref Range Status   09/16/2019 7 (L) 8 - 16 mmol/L Final     eGFR if    Date Value Ref Range Status   09/16/2019 >60.0 >60 mL/min/1.73 m^2 Final     eGFR if non    Date Value Ref Range Status   09/16/2019 >60.0 >60 mL/min/1.73 m^2 Final     Comment:     Calculation used to obtain the estimated glomerular filtration  rate (eGFR) is the CKD-EPI equation.        Lab Results   Component Value Date    CEA 2.6 06/24/2019     No results found for: PSA        Assessment/Plan:     Problem List Items Addressed This Visit     Recurrent malignant neoplasm of right breast - Primary (Chronic)     Patient is progressing slowly on her PET scan.  I discussed that we can stop the Ibrance.  I feel that patient may benefit from piqray and discussed the risks and benefits of this today.  I will begin this now and have her back again in about four weeks.      Patient also having neck pain which I feel this may be due to the LN seen on the pet scan in this area and hopefully this will improve this pain.           Relevant Medications    alpelisib (PIQRAY) 300 mg/day (150 mg x 2) Tab    Other Relevant Orders    Urinalysis w/reflex to Microscopic    URINE C&S    CBC auto differential    Comprehensive metabolic panel      Other Visit Diagnoses     Dysuria        Relevant Orders    Urinalysis w/reflex to Microscopic    URINE C&S          Discussion:   High complexity, high risk medications.   Follow up in about 4 weeks (around 4/13/2020).      Electronically signed by Rudolph Bryan

## 2020-03-16 NOTE — TELEPHONE ENCOUNTER
Informed patient's daughter that [facility] received prescription for Piqray and prior authorization is required.  [facility] will be back in touch once insurance determination is received.

## 2020-03-16 NOTE — ASSESSMENT & PLAN NOTE
Patient is progressing slowly on her PET scan.  I discussed that we can stop the Ibrance.  I feel that patient may benefit from piqray and discussed the risks and benefits of this today.  I will begin this now and have her back again in about four weeks.      Patient also having neck pain which I feel this may be due to the LN seen on the pet scan in this area and hopefully this will improve this pain.

## 2020-03-17 ENCOUNTER — TELEPHONE (OUTPATIENT)
Dept: HEMATOLOGY/ONCOLOGY | Facility: CLINIC | Age: 75
End: 2020-03-17

## 2020-03-17 NOTE — TELEPHONE ENCOUNTER
----- Message from Rudolph Barton MD sent at 3/17/2020  7:02 AM CDT -----  Please call the patient regarding her abnormal result. She has a uti.  Put her on levaquin 250mg daily for one week.        Spoke to daughterBritney. levaquin 250mg called to [facility].

## 2020-03-18 LAB — BACTERIA UR CULT: ABNORMAL

## 2020-03-18 NOTE — TELEPHONE ENCOUNTER
DOCUMENTATION ONLY:  Prior authorization for Piqray 300 mg/day (150 x 2) Tablet #56/28 approved from 03/17/2020 to 12/31/2020  Case ID: [id number]    Co-pay: $2,528.48    Patient Assistance IS required. Sending to the financial assistance team to investigate assistance options. - MARCO A

## 2020-03-19 ENCOUNTER — TELEPHONE (OUTPATIENT)
Dept: HEMATOLOGY/ONCOLOGY | Facility: CLINIC | Age: 75
End: 2020-03-19

## 2020-03-19 DIAGNOSIS — C50.911 RECURRENT MALIGNANT NEOPLASM OF RIGHT BREAST: Primary | ICD-10-CM

## 2020-03-19 DIAGNOSIS — Z79.899 ENCOUNTER FOR LONG-TERM (CURRENT) USE OF OTHER MEDICATIONS: ICD-10-CM

## 2020-03-19 NOTE — TELEPHONE ENCOUNTER
Spoke to Britney. I explained to her that we are ordering a hgb.A1C to be done prior to the start of the new Piqray medication. Order is in at [facility]. I asked that this lab be done fasting and Britney voiced understanding of all.

## 2020-03-20 NOTE — TELEPHONE ENCOUNTER
Piqray is approved by the patient's insurance with a high copay. We will be assisting the patient in applying to the  patient assistance program. Sending a staff message to Dr. Barton regarding assistance application and faxing application prescription for his review and signature.

## 2020-03-26 ENCOUNTER — LAB VISIT (OUTPATIENT)
Dept: LAB | Facility: HOSPITAL | Age: 75
End: 2020-03-26
Attending: INTERNAL MEDICINE
Payer: MEDICARE

## 2020-03-26 DIAGNOSIS — Z79.899 ENCOUNTER FOR LONG-TERM (CURRENT) USE OF OTHER MEDICATIONS: ICD-10-CM

## 2020-03-26 DIAGNOSIS — C50.911 RECURRENT MALIGNANT NEOPLASM OF RIGHT BREAST: Chronic | ICD-10-CM

## 2020-03-26 LAB
ALBUMIN SERPL BCP-MCNC: 4.2 G/DL (ref 3.5–5.2)
ALP SERPL-CCNC: 54 U/L (ref 55–135)
ALT SERPL W/O P-5'-P-CCNC: 26 U/L (ref 10–44)
ANION GAP SERPL CALC-SCNC: 7 MMOL/L (ref 8–16)
AST SERPL-CCNC: 31 U/L (ref 10–40)
BASOPHILS # BLD AUTO: 0.11 K/UL (ref 0–0.2)
BASOPHILS NFR BLD: 2.9 % (ref 0–1.9)
BILIRUB SERPL-MCNC: 0.7 MG/DL (ref 0.1–1)
BUN SERPL-MCNC: 20 MG/DL (ref 8–23)
CALCIUM SERPL-MCNC: 9.3 MG/DL (ref 8.7–10.5)
CHLORIDE SERPL-SCNC: 105 MMOL/L (ref 95–110)
CO2 SERPL-SCNC: 29 MMOL/L (ref 23–29)
CREAT SERPL-MCNC: 0.7 MG/DL (ref 0.5–1.4)
DIFFERENTIAL METHOD: ABNORMAL
EOSINOPHIL # BLD AUTO: 0.1 K/UL (ref 0–0.5)
EOSINOPHIL NFR BLD: 2.1 % (ref 0–8)
ERYTHROCYTE [DISTWIDTH] IN BLOOD BY AUTOMATED COUNT: 14 % (ref 11.5–14.5)
EST. GFR  (AFRICAN AMERICAN): >60 ML/MIN/1.73 M^2
EST. GFR  (NON AFRICAN AMERICAN): >60 ML/MIN/1.73 M^2
ESTIMATED AVG GLUCOSE: 123 MG/DL (ref 68–131)
GLUCOSE SERPL-MCNC: 83 MG/DL (ref 70–110)
HBA1C MFR BLD HPLC: 5.9 % (ref 4.5–6.2)
HCT VFR BLD AUTO: 38.3 % (ref 37–48.5)
HGB BLD-MCNC: 12.6 G/DL (ref 12–16)
IMM GRANULOCYTES # BLD AUTO: 0.02 K/UL (ref 0–0.04)
IMM GRANULOCYTES NFR BLD AUTO: 0.5 % (ref 0–0.5)
LYMPHOCYTES # BLD AUTO: 1.2 K/UL (ref 1–4.8)
LYMPHOCYTES NFR BLD: 31.8 % (ref 18–48)
MCH RBC QN AUTO: 34.5 PG (ref 27–31)
MCHC RBC AUTO-ENTMCNC: 32.9 G/DL (ref 32–36)
MCV RBC AUTO: 105 FL (ref 82–98)
MONOCYTES # BLD AUTO: 0.5 K/UL (ref 0.3–1)
MONOCYTES NFR BLD: 11.7 % (ref 4–15)
NEUTROPHILS # BLD AUTO: 2 K/UL (ref 1.8–7.7)
NEUTROPHILS NFR BLD: 51 % (ref 38–73)
NRBC BLD-RTO: 0 /100 WBC
PLATELET # BLD AUTO: 178 K/UL (ref 150–350)
PMV BLD AUTO: 8.8 FL (ref 9.2–12.9)
POTASSIUM SERPL-SCNC: 3.7 MMOL/L (ref 3.5–5.1)
PROT SERPL-MCNC: 7.3 G/DL (ref 6–8.4)
RBC # BLD AUTO: 3.65 M/UL (ref 4–5.4)
SODIUM SERPL-SCNC: 141 MMOL/L (ref 136–145)
WBC # BLD AUTO: 3.84 K/UL (ref 3.9–12.7)

## 2020-03-26 PROCEDURE — 85025 COMPLETE CBC W/AUTO DIFF WBC: CPT

## 2020-03-26 PROCEDURE — 80053 COMPREHEN METABOLIC PANEL: CPT

## 2020-03-26 PROCEDURE — 83036 HEMOGLOBIN GLYCOSYLATED A1C: CPT

## 2020-03-26 PROCEDURE — 36415 COLL VENOUS BLD VENIPUNCTURE: CPT

## 2020-04-17 NOTE — TELEPHONE ENCOUNTER
Patient was approved to receive her Piqray from [employer] Patient Assistance through 12/31/20 with $0.00 copay. Patient has been informed and provided with information needed to schedule a shipment and request refills. Sending a staff message to  regarding Piqray assistance approval.

## 2020-04-17 NOTE — TELEPHONE ENCOUNTER
FOR DOCUMENTATION ONLY:  Financial Assistance for Piqray is approved from 4/15/20 to 12/31/20 with $0.00 copay  Source: [employer] Patient Assistance Program  Phone: [phone]  Fax: [phone]  Dispensing Pharmacy: [facility]  Phone: [phone]  Fax: [phone]

## 2020-04-20 ENCOUNTER — TELEPHONE (OUTPATIENT)
Dept: HEMATOLOGY/ONCOLOGY | Facility: CLINIC | Age: 75
End: 2020-04-20

## 2020-04-20 ENCOUNTER — OFFICE VISIT (OUTPATIENT)
Dept: HEMATOLOGY/ONCOLOGY | Facility: CLINIC | Age: 75
End: 2020-04-20
Payer: MEDICARE

## 2020-04-20 DIAGNOSIS — C50.911 RECURRENT MALIGNANT NEOPLASM OF RIGHT BREAST: Primary | ICD-10-CM

## 2020-04-20 DIAGNOSIS — C50.911 RECURRENT MALIGNANT NEOPLASM OF RIGHT BREAST: Chronic | ICD-10-CM

## 2020-04-20 PROCEDURE — 99213 PR OFFICE/OUTPT VISIT, EST, LEVL III, 20-29 MIN: ICD-10-PCS | Mod: 95,,, | Performed by: INTERNAL MEDICINE

## 2020-04-20 PROCEDURE — 1101F PT FALLS ASSESS-DOCD LE1/YR: CPT | Mod: 95,,, | Performed by: INTERNAL MEDICINE

## 2020-04-20 PROCEDURE — 1159F PR MEDICATION LIST DOCUMENTED IN MEDICAL RECORD: ICD-10-PCS | Mod: 95,,, | Performed by: INTERNAL MEDICINE

## 2020-04-20 PROCEDURE — 99213 OFFICE O/P EST LOW 20 MIN: CPT | Mod: 95,,, | Performed by: INTERNAL MEDICINE

## 2020-04-20 PROCEDURE — 1101F PR PT FALLS ASSESS DOC 0-1 FALLS W/OUT INJ PAST YR: ICD-10-PCS | Mod: 95,,, | Performed by: INTERNAL MEDICINE

## 2020-04-20 PROCEDURE — 1159F MED LIST DOCD IN RCRD: CPT | Mod: 95,,, | Performed by: INTERNAL MEDICINE

## 2020-04-20 NOTE — ASSESSMENT & PLAN NOTE
Patient has not yet started the Piqray but it has been approved.  Will likely begin next week.  Will have her back with me in four weeks and will check labs weekly once she begins the new therapy.  Discussed all this in detail today.

## 2020-04-20 NOTE — PROGRESS NOTES
Subjective:       Patient ID: Melissa Sood is a 74 y.o. female.    Chief Complaint: breast cancer follow up.     HPI:  Patient is doing well at this point and has not yet started the Piqray.      All medications and past medical and surgical history have been reviewed.     The patient location is: Home  Visit type: Virtual visit with synchronous audio and video due to covid 19 pandemic crisis.    Review of patient's allergies indicates:   Allergen Reactions    Codeine        ROS  GEN:   normal without any fever, night sweats or weight loss  HEENT:  normal with no HA's,  changes in vision  CV:   normal with no CP, SOB  PULM:  normal with no SOB, cough  GI:   normal with no abdominal pain, nausea, vomiting  :   normal with no hematuria, dysuria  SKIN:   normal with no rash      Previous FAMHX and SOCHX information reviewed and remains unchanged         Objective:        Physical Exam  There were no vitals taken for this visit.  GEN:   no apparent distress, comfortable; AAOx3  HEAD:  atraumatic and normocephalic  EYES:   no pallor, no icterus,  PSYCH:  normal mood, affect and behavior  PULM:   no apparent distress, breathing unlabored.  NEURO:  Grossly intact.   MUSC:   Visibly normal ROM throughout.           All lab results and imaging results have been reviewed and discussed with the patient  No results found for this or any previous visit (from the past 336 hour(s)).  CMP  Sodium   Date Value Ref Range Status   03/26/2020 141 136 - 145 mmol/L Final   06/24/2019 141 134 - 144 mmol/L      Potassium   Date Value Ref Range Status   03/26/2020 3.7 3.5 - 5.1 mmol/L Final     Chloride   Date Value Ref Range Status   03/26/2020 105 95 - 110 mmol/L Final   06/24/2019 103 98 - 110 mmol/L      CO2   Date Value Ref Range Status   03/26/2020 29 23 - 29 mmol/L Final     Glucose   Date Value Ref Range Status   03/26/2020 83 70 - 110 mg/dL Final   06/24/2019 87 70 - 99 mg/dL      BUN, Bld   Date Value Ref Range Status    03/26/2020 20 8 - 23 mg/dL Final     Creatinine   Date Value Ref Range Status   03/26/2020 0.7 0.5 - 1.4 mg/dL Final   06/24/2019 0.82 0.60 - 1.40 mg/dL      Calcium   Date Value Ref Range Status   03/26/2020 9.3 8.7 - 10.5 mg/dL Final     Total Protein   Date Value Ref Range Status   03/26/2020 7.3 6.0 - 8.4 g/dL Final     Albumin   Date Value Ref Range Status   03/26/2020 4.2 3.5 - 5.2 g/dL Final   06/24/2019 4.3 3.1 - 4.7 g/dL      Total Bilirubin   Date Value Ref Range Status   03/26/2020 0.7 0.1 - 1.0 mg/dL Final     Comment:     For infants and newborns, interpretation of results should be based  on gestational age, weight and in agreement with clinical  observations.  Premature Infant recommended reference ranges:  Up to 24 hours.............<8.0 mg/dL  Up to 48 hours............<12.0 mg/dL  3-5 days..................<15.0 mg/dL  6-29 days.................<15.0 mg/dL       Alkaline Phosphatase   Date Value Ref Range Status   03/26/2020 54 (L) 55 - 135 U/L Final     AST   Date Value Ref Range Status   03/26/2020 31 10 - 40 U/L Final     ALT   Date Value Ref Range Status   03/26/2020 26 10 - 44 U/L Final     Anion Gap   Date Value Ref Range Status   03/26/2020 7 (L) 8 - 16 mmol/L Final     eGFR if    Date Value Ref Range Status   03/26/2020 >60.0 >60 mL/min/1.73 m^2 Final     eGFR if non    Date Value Ref Range Status   03/26/2020 >60.0 >60 mL/min/1.73 m^2 Final     Comment:     Calculation used to obtain the estimated glomerular filtration  rate (eGFR) is the CKD-EPI equation.          Total time spent with patient: 9 minutes.  Assessment:      1. Recurrent malignant neoplasm of right breast      Problem List Items Addressed This Visit     Recurrent malignant neoplasm of right breast (Chronic)     Patient has not yet started the Piqray but it has been approved.  Will likely begin next week.  Will have her back with me in four weeks and will check labs weekly once she begins  the new therapy.  Discussed all this in detail today.                 Plan:   As Above in Assessment      The plan was discussed with the patient and all questions/concerns have been answered to the patient's satisfaction.          Thank you for allowing me to participate in this pleasant patient's care. Please call with any questions or concerns.

## 2020-04-22 ENCOUNTER — TELEPHONE (OUTPATIENT)
Dept: HEMATOLOGY/ONCOLOGY | Facility: CLINIC | Age: 75
End: 2020-04-22

## 2020-04-22 NOTE — TELEPHONE ENCOUNTER
I spoke to [facility] [place] [facility] today and clarified the Piqray dosage. 150mg take 2 pills po daily. 11 refills also given to them. I then called Britney Ms. Torres's daughter and updated her on all of this. She will let me know when Melissa actually receives the medication and then 1 week after she starts the medicine she will start with the labs work as ordered.

## 2020-05-27 ENCOUNTER — TELEPHONE (OUTPATIENT)
Dept: HEMATOLOGY/ONCOLOGY | Facility: CLINIC | Age: 75
End: 2020-05-27

## 2020-05-27 DIAGNOSIS — R11.0 NAUSEA: Primary | ICD-10-CM

## 2020-05-27 RX ORDER — PROMETHAZINE HYDROCHLORIDE 25 MG/1
25 TABLET ORAL
Qty: 30 TABLET | Refills: 5 | Status: SHIPPED | OUTPATIENT
Start: 2020-05-27 | End: 2020-09-08

## 2020-05-27 RX ORDER — ONDANSETRON HYDROCHLORIDE 8 MG/1
8 TABLET, FILM COATED ORAL EVERY 8 HOURS PRN
Qty: 30 TABLET | Refills: 5 | Status: SHIPPED | OUTPATIENT
Start: 2020-05-27 | End: 2021-05-27

## 2020-05-27 NOTE — TELEPHONE ENCOUNTER
Spoke with the patients daughter and she is having nausea and decreased appetite. She has been on the Piqray for 2 weeks and is not eating much and is losing weight. Notified her we can start Zofran when she gets up to start taking before she takes the Piqray. Zofran and Phenergan sent to [facility]. The daughter will call by Friday if no improvement in appetite or nausea still not controlled.

## 2020-05-29 ENCOUNTER — TELEPHONE (OUTPATIENT)
Dept: HEMATOLOGY/ONCOLOGY | Facility: CLINIC | Age: 75
End: 2020-05-29

## 2020-05-29 NOTE — TELEPHONE ENCOUNTER
----- Message from Susana Chaney, Patient Care Assistant sent at 5/29/2020 10:15 AM CDT -----  Patient Son(Arcenio) called in stating he would like to know if her lab orders could be sent to [facility]. He can be reached at [phone]        Spoke to Arcenio and also verified with [facility] that they are in same epic system and can see the standing order to draw labs. He will take her to get her labs done there today.

## 2020-06-01 ENCOUNTER — PATIENT MESSAGE (OUTPATIENT)
Dept: HEMATOLOGY/ONCOLOGY | Facility: CLINIC | Age: 75
End: 2020-06-01

## 2020-06-08 ENCOUNTER — TELEPHONE (OUTPATIENT)
Dept: PRIMARY CARE CLINIC | Facility: CLINIC | Age: 75
End: 2020-06-08

## 2020-06-08 ENCOUNTER — TELEPHONE (OUTPATIENT)
Dept: HEMATOLOGY/ONCOLOGY | Facility: CLINIC | Age: 75
End: 2020-06-08

## 2020-06-08 DIAGNOSIS — E11.69 TYPE 2 DIABETES MELLITUS WITH HYPERLIPIDEMIA: Primary | ICD-10-CM

## 2020-06-08 DIAGNOSIS — E78.5 TYPE 2 DIABETES MELLITUS WITH HYPERLIPIDEMIA: Primary | ICD-10-CM

## 2020-06-08 RX ORDER — INSULIN PUMP SYRINGE, 3 ML
EACH MISCELLANEOUS
Qty: 1 EACH | Refills: 0 | Status: SHIPPED | OUTPATIENT
Start: 2020-06-08 | End: 2021-01-01

## 2020-06-08 RX ORDER — LANCETS 28 GAUGE
1 EACH MISCELLANEOUS 2 TIMES DAILY
Qty: 100 EACH | Refills: 5 | Status: SHIPPED | OUTPATIENT
Start: 2020-06-08 | End: 2020-06-24 | Stop reason: SDUPTHER

## 2020-06-08 NOTE — TELEPHONE ENCOUNTER
----- Message from Vicky Edge sent at 6/8/2020  3:23 PM CDT -----  Contact: Daughter  Type:  Sooner Apoointment Request    Caller is requesting a sooner appointment.  Caller declined first available appointment listed below.  Caller will not accept being placed on the waitlist and is requesting a message be sent to doctor.    Name of Caller:  Britney, daughter  When is the first available appointment?  06/15/2020  Symptoms:  Follow up [facility] 06/05/2020 for elevated glucose 400  Best Call Back Number:  [phone]  Additional Information:  Please call her. Thanks.

## 2020-06-08 NOTE — TELEPHONE ENCOUNTER
Pt scheduled for hosp f/u with Dr. Longoria and scheduled to come in for blood sugar check tomorrow since glucose has been elevated.

## 2020-06-08 NOTE — TELEPHONE ENCOUNTER
Attempted to return the patients daughters call to notify her to keep the appt tomorrow. He wants to see her before adding the scans. Glucometer was sent and to [facility] and message to Dr. Pagan regarding managing her BS.

## 2020-06-08 NOTE — TELEPHONE ENCOUNTER
----- Message from Viri Suárez sent at 6/8/2020 10:14 AM CDT -----  The patient's daughter called and said the patient needs to have a scan done because they could not do her PET. She has an appointment tomorrow and wants to know if they should keep the appointment or try to have a scan done first. Please call her back at [phone].

## 2020-06-09 ENCOUNTER — OFFICE VISIT (OUTPATIENT)
Dept: HEMATOLOGY/ONCOLOGY | Facility: CLINIC | Age: 75
End: 2020-06-09
Payer: MEDICARE

## 2020-06-09 VITALS
DIASTOLIC BLOOD PRESSURE: 76 MMHG | BODY MASS INDEX: 23.7 KG/M2 | HEART RATE: 71 BPM | TEMPERATURE: 98 F | SYSTOLIC BLOOD PRESSURE: 134 MMHG | RESPIRATION RATE: 12 BRPM | WEIGHT: 129.63 LBS

## 2020-06-09 DIAGNOSIS — E78.5 TYPE 2 DIABETES MELLITUS WITH HYPERLIPIDEMIA: ICD-10-CM

## 2020-06-09 DIAGNOSIS — C50.911 RECURRENT MALIGNANT NEOPLASM OF RIGHT BREAST: Chronic | ICD-10-CM

## 2020-06-09 DIAGNOSIS — E11.69 TYPE 2 DIABETES MELLITUS WITH HYPERLIPIDEMIA: ICD-10-CM

## 2020-06-09 PROCEDURE — 1101F PT FALLS ASSESS-DOCD LE1/YR: CPT | Mod: S$GLB,,, | Performed by: INTERNAL MEDICINE

## 2020-06-09 PROCEDURE — 1159F MED LIST DOCD IN RCRD: CPT | Mod: S$GLB,,, | Performed by: INTERNAL MEDICINE

## 2020-06-09 PROCEDURE — 1125F PR PAIN SEVERITY QUANTIFIED, PAIN PRESENT: ICD-10-PCS | Mod: S$GLB,,, | Performed by: INTERNAL MEDICINE

## 2020-06-09 PROCEDURE — 99214 OFFICE O/P EST MOD 30 MIN: CPT | Mod: S$GLB,,, | Performed by: INTERNAL MEDICINE

## 2020-06-09 PROCEDURE — 1101F PR PT FALLS ASSESS DOC 0-1 FALLS W/OUT INJ PAST YR: ICD-10-PCS | Mod: S$GLB,,, | Performed by: INTERNAL MEDICINE

## 2020-06-09 PROCEDURE — 99214 PR OFFICE/OUTPT VISIT, EST, LEVL IV, 30-39 MIN: ICD-10-PCS | Mod: S$GLB,,, | Performed by: INTERNAL MEDICINE

## 2020-06-09 PROCEDURE — 1125F AMNT PAIN NOTED PAIN PRSNT: CPT | Mod: S$GLB,,, | Performed by: INTERNAL MEDICINE

## 2020-06-09 PROCEDURE — 1159F PR MEDICATION LIST DOCUMENTED IN MEDICAL RECORD: ICD-10-PCS | Mod: S$GLB,,, | Performed by: INTERNAL MEDICINE

## 2020-06-09 NOTE — PROGRESS NOTES
PROGRESS NOTE    Subjective:       Patient ID: Melissa Sood is a 74 y.o. female.    Breast Cancer:  Dx: 2003  T2N1M0 stage II  neoadj AC-T, surgery-XRT Arimidex-Femara till 2/2011  BRCA 1/2 neg  ER pos, Her 2 neg  Recurrence 3/2018  Started Ibrance/Exemestane 4/30/2018-5/1/2020.  Piqray 5/15/2020- 6/7/2020(held due to high glu)    Chief Complaint:  No chief complaint on file.  breast cancer follow up.      History of Present Illness:   Melissa Sood is a 74 y.o. female who presents for follow up    Patient has started piqray and is not tolerating well.  She has been on the 300mg and her blood sugars have been very high, she has lost 18lbs    PIK3CA--path 4/2/2018    PET impression 12/13/2019:  1. Two new small hypermetabolic masses in the right supraclavicular region and in the superior mediastinum, suspicious for new sites of metastatic disease.  2. No additional evidence of active metastatic disease.  3. Stable diffuse osseous metastases without FDG hypermetabolism, compatible with treated or quiescent disease.    Labs 6/24/2019:  WBC 2.5(ANC 1.3)  Hb 11.6  Plt: 110--228  CMP unremark  CA 15-3:  42.9--44--55  CA 27.29: 62.5--68--69      PET scan impression 3/15/2019:  IMPRESSION: Stable sclerotic foci throughout the axial skeleton with no FDG  activity compatible with treated metastasis    No evidence of recurrent or new metastatic disease    Prior cholecystectomy and hysterectomy    Family and Social history reviewed and is unchanged from 9/22/2014      ROS:  Review of Systems   Constitutional: Negative for fever.   Respiratory: Negative for shortness of breath.    Cardiovascular: Negative for chest pain and leg swelling.   Gastrointestinal: Negative for abdominal pain and blood in stool.   Genitourinary: Negative for hematuria.   Skin: Negative for rash.          Current Outpatient Medications:     alpelisib (PIQRAY) 300 mg/day (150 mg x 2) Tab, Take 300  mg by mouth once daily., Disp: 60 tablet, Rfl: 6    ALPRAZolam (XANAX) 0.25 MG tablet, TAKE 1 TABLET BY MOUTH THREE TIMES DAILY AS NEEDED FOR ANXIETY, Disp: 30 tablet, Rfl: 0    aspirin (ECOTRIN) 81 MG EC tablet, Take 81 mg by mouth once daily., Disp: , Rfl:     atorvastatin (LIPITOR) 20 MG tablet, TAKE 1 TABLET BY MOUTH ONCE DAILY, Disp: 90 tablet, Rfl: 3    blood sugar diagnostic Strp, 1 strip by Misc.(Non-Drug; Combo Route) route 2 (two) times a day., Disp: 100 each, Rfl: 5    blood-glucose meter (FREESTYLE SYSTEM KIT) kit, Use as instructed, Disp: 1 each, Rfl: 0    cyanocobalamin 1,000 mcg/mL injection, ADM 1 ML IM Q WK, Disp: , Rfl: 6    docusate sodium (COLACE) 100 MG capsule, Take 1 capsule (100 mg total) by mouth 2 (two) times daily., Disp: 60 capsule, Rfl: 0    donepezil (ARICEPT) 10 MG tablet, Take 1 tablet by mouth every evening., Disp: , Rfl:     escitalopram oxalate (LEXAPRO) 20 MG tablet, TAKE 1 TABLET BY MOUTH EVERY DAY, Disp: 90 tablet, Rfl: 0    escitalopram oxalate (LEXAPRO) 20 MG tablet, TAKE 1 TABLET BY MOUTH EVERY DAY, Disp: 90 tablet, Rfl: 0    exemestane (AROMASIN) 25 mg tablet, TAKE 1 TABLET BY MOUTH EVERY DAY, Disp: 30 tablet, Rfl: 11    HYDROcodone-acetaminophen (NORCO) 5-325 mg per tablet, Take 1 tablet by mouth every 6 (six) hours as needed for Pain., Disp: 60 tablet, Rfl: 0    IBRANCE 125 mg Cap, TAKE 1 CAPSULE (125MG) BY MOUTH DAILY FOR 21 DAYS THEN 7 DAYS OFF, Disp: 21 capsule, Rfl: 5    lancets (FREESTYLE LANCETS) 28 gauge Misc, 1 lancet by Misc.(Non-Drug; Combo Route) route 2 (two) times a day., Disp: 100 each, Rfl: 5    memantine (NAMENDA XR) 28 mg CSpX, memantine 28 mg capsule sprinkle,extended release 24hr  TAKE 1 CAPSULE BY MOUTH EVERY DAY, Disp: , Rfl:     metFORMIN (GLUCOPHAGE) 500 MG tablet, TAKE 1 TABLET BY MOUTH EVERY DAY WITH A MEAL, Disp: 90 tablet, Rfl: 0    ondansetron (ZOFRAN) 8 MG tablet, Take 1 tablet (8 mg total) by mouth every 8 (eight) hours as  needed for Nausea., Disp: 30 tablet, Rfl: 5    polyethylene glycol (GLYCOLAX) 17 gram/dose powder, Take 17 g by mouth once daily., Disp: 238 g, Rfl: 0    promethazine (PHENERGAN) 25 MG tablet, Take 1 tablet (25 mg total) by mouth every 4 to 6 hours as needed., Disp: 30 tablet, Rfl: 5    sertraline (ZOLOFT) 50 MG tablet, sertraline 50 mg tablet  TK 1 T PO QD FOR 2 WKS THEN TK 1 T PO BID, Disp: , Rfl:         Objective:       Physical Examination:     /76 (BP Location: Left arm, Patient Position: Sitting)   Pulse 71   Temp 98.2 °F (36.8 °C) (Oral)   Resp 12   Wt 58.8 kg (129 lb 9.6 oz)   BMI 23.70 kg/m²     Physical Exam   Constitutional: She appears well-developed and well-nourished.   HENT:   Head: Normocephalic and atraumatic.   Right Ear: External ear normal.   Left Ear: External ear normal.   Mouth/Throat: Oropharynx is clear and moist.   Eyes: Pupils are equal, round, and reactive to light. Conjunctivae are normal.   Neck: No tracheal deviation present. No thyromegaly present.   Cardiovascular: Normal rate, regular rhythm and normal heart sounds.   Pulmonary/Chest: Effort normal and breath sounds normal.   Abdominal: Soft. Bowel sounds are normal. She exhibits no distension and no mass. There is no tenderness.   Musculoskeletal: She exhibits no edema.   Neurological:   Neuro intact througout   Skin: No rash noted.   Psychiatric: She has a normal mood and affect. Her behavior is normal. Judgment and thought content normal.       Labs:   Recent Results (from the past 336 hour(s))   CBC auto differential    Collection Time: 06/04/20  5:29 PM   Result Value Ref Range    WBC 7.60 3.90 - 12.70 K/uL    Hemoglobin 13.9 12.0 - 16.0 g/dL    Hematocrit 41.0 37.0 - 48.5 %    Platelets 181 150 - 350 K/uL   CBC auto differential    Collection Time: 06/01/20  9:04 AM   Result Value Ref Range    WBC 4.10 3.90 - 12.70 K/uL    Hemoglobin 14.0 12.0 - 16.0 g/dL    Hematocrit 41.7 37.0 - 48.5 %    Platelets 188 150 -  350 K/uL     CMP  Sodium   Date Value Ref Range Status   06/04/2020 132 (L) 136 - 145 mmol/L Final   06/24/2019 141 134 - 144 mmol/L      Potassium   Date Value Ref Range Status   06/04/2020 3.6 3.5 - 5.1 mmol/L Final     Chloride   Date Value Ref Range Status   06/04/2020 93 (L) 101 - 111 mmol/L Final   06/24/2019 103 98 - 110 mmol/L      CO2   Date Value Ref Range Status   06/04/2020 24 23 - 29 mmol/L Final     Glucose   Date Value Ref Range Status   06/04/2020 415 (H) 74 - 118 mg/dL Final   06/24/2019 87 70 - 99 mg/dL      BUN, Bld   Date Value Ref Range Status   06/04/2020 14 8 - 23 mg/dL Final     Creatinine   Date Value Ref Range Status   06/04/2020 1.0 0.5 - 1.4 mg/dL Final   06/24/2019 0.82 0.60 - 1.40 mg/dL      Calcium   Date Value Ref Range Status   06/04/2020 9.1 8.6 - 10.0 mg/dL Final     Total Protein   Date Value Ref Range Status   06/04/2020 7.5 6.0 - 8.4 g/dL Final     Albumin   Date Value Ref Range Status   06/04/2020 4.7 3.5 - 5.2 g/dL Final   06/24/2019 4.3 3.1 - 4.7 g/dL      Total Bilirubin   Date Value Ref Range Status   06/04/2020 1.0 0.3 - 1.2 mg/dL Final     Comment:     For infants and newborns, interpretation of results should be based  on gestational age, weight and in agreement with clinical  observations.  Premature Infant recommended reference ranges:  Up to 24 hours.............<8.0 mg/dL  Up to 48 hours............<12.0 mg/dL  3-5 days..................<15.0 mg/dL  6-29 days.................<15.0 mg/dL       Alkaline Phosphatase   Date Value Ref Range Status   06/04/2020 74 38 - 126 U/L Final     AST   Date Value Ref Range Status   06/04/2020 23 15 - 41 U/L Final     ALT   Date Value Ref Range Status   06/04/2020 26 14 - 54 U/L Final     Anion Gap   Date Value Ref Range Status   06/04/2020 15 8 - 16 mmol/L Final     eGFR if    Date Value Ref Range Status   06/04/2020 >60.0 >60 mL/min/1.73 m^2 Final     eGFR if non    Date Value Ref Range Status    06/04/2020 54.9 (A) >60 mL/min/1.73 m^2 Final     Comment:     Calculation used to obtain the estimated glomerular filtration  rate (eGFR) is the CKD-EPI equation.        Lab Results   Component Value Date    CEA 2.6 06/24/2019     No results found for: PSA        Assessment/Plan:     Problem List Items Addressed This Visit     Recurrent malignant neoplasm of right breast (Chronic)     Patient has not been tolerating the Piqray well and has been on the 300mg.  I feel she cannot tolerate the medication at this dose but would like to try the 150mg daily dose to see how she does and if she can tolerate this.  I will scan after 6-8 weeks of therapy and will scan after that.  Will resume the 150mg dose on 6/12/2020.  Will see her back with me in three weeks.          Type 2 diabetes mellitus with hyperlipidemia     Patient will need aggressive DM care and will see her pcp about this.  Await their input.                 Discussion:   High complexity, high risk medications.   Follow up in about 3 weeks (around 6/30/2020).      Electronically signed by Rudolph Bryan

## 2020-06-09 NOTE — ASSESSMENT & PLAN NOTE
Patient has not been tolerating the Piqray well and has been on the 300mg.  I feel she cannot tolerate the medication at this dose but would like to try the 150mg daily dose to see how she does and if she can tolerate this.  I will scan after 6-8 weeks of therapy and will scan after that.  Will resume the 150mg dose on 6/12/2020.  Will see her back with me in three weeks.

## 2020-06-10 ENCOUNTER — TELEPHONE (OUTPATIENT)
Dept: HEMATOLOGY/ONCOLOGY | Facility: CLINIC | Age: 75
End: 2020-06-10

## 2020-06-10 NOTE — TELEPHONE ENCOUNTER
Spoke to Arcenio about labs. Explained that she is to get weekly CBC, CMP and that these do not need to be fasting labs. She will go today for these labs and he will check with Dr. Lopez to see if he needs labs done as well.

## 2020-06-10 NOTE — TELEPHONE ENCOUNTER
----- Message from Aneta Moore sent at 6/10/2020  8:27 AM CDT -----  Contact: Pt's Son Arcenio  Patient Advice:    Pt's Son Arcenio called to speak to the nurse regarding orders for blood work for the pt that is supposed to be taken today and would like a call back this morning asap and can be reached at [phone]

## 2020-06-11 RX ORDER — METFORMIN HYDROCHLORIDE 500 MG/1
TABLET ORAL
Qty: 90 TABLET | Refills: 0 | Status: SHIPPED | OUTPATIENT
Start: 2020-06-11 | End: 2020-06-12 | Stop reason: SDUPTHER

## 2020-06-11 NOTE — TELEPHONE ENCOUNTER
Reviewed recent labs from oncology.   Her blood sugars are in the 300-400's   I sent in her Metformin but I would recommend that she sees me to discuss her diabetes. She is likely getting something that is causing her blood sugars to increase   thanks

## 2020-06-11 NOTE — TELEPHONE ENCOUNTER
Pt is seeing Dr. Longoria tomorrow for a hospital follow up. I have pended a referral in her chart for him to sign so that we can get patient set up with you

## 2020-06-12 ENCOUNTER — OFFICE VISIT (OUTPATIENT)
Dept: PRIMARY CARE CLINIC | Facility: CLINIC | Age: 75
End: 2020-06-12
Payer: MEDICARE

## 2020-06-12 VITALS
WEIGHT: 131.63 LBS | OXYGEN SATURATION: 98 % | HEART RATE: 67 BPM | RESPIRATION RATE: 18 BRPM | HEIGHT: 62 IN | SYSTOLIC BLOOD PRESSURE: 138 MMHG | TEMPERATURE: 98 F | BODY MASS INDEX: 24.22 KG/M2 | DIASTOLIC BLOOD PRESSURE: 58 MMHG

## 2020-06-12 DIAGNOSIS — E78.5 TYPE 2 DIABETES MELLITUS WITH HYPERLIPIDEMIA: Primary | ICD-10-CM

## 2020-06-12 DIAGNOSIS — Z17.0 MALIGNANT NEOPLASM OF UPPER-OUTER QUADRANT OF RIGHT BREAST IN FEMALE, ESTROGEN RECEPTOR POSITIVE: ICD-10-CM

## 2020-06-12 DIAGNOSIS — E11.69 TYPE 2 DIABETES MELLITUS WITH HYPERLIPIDEMIA: Primary | ICD-10-CM

## 2020-06-12 DIAGNOSIS — K59.00 CONSTIPATION, UNSPECIFIED CONSTIPATION TYPE: ICD-10-CM

## 2020-06-12 DIAGNOSIS — C50.411 MALIGNANT NEOPLASM OF UPPER-OUTER QUADRANT OF RIGHT BREAST IN FEMALE, ESTROGEN RECEPTOR POSITIVE: ICD-10-CM

## 2020-06-12 LAB — GLUCOSE SERPL-MCNC: 234 MG/DL (ref 70–110)

## 2020-06-12 PROCEDURE — 82962 POCT GLUCOSE, HAND-HELD DEVICE: ICD-10-PCS | Mod: S$GLB,,, | Performed by: INTERNAL MEDICINE

## 2020-06-12 PROCEDURE — 1159F PR MEDICATION LIST DOCUMENTED IN MEDICAL RECORD: ICD-10-PCS | Mod: S$GLB,,, | Performed by: INTERNAL MEDICINE

## 2020-06-12 PROCEDURE — 1159F MED LIST DOCD IN RCRD: CPT | Mod: S$GLB,,, | Performed by: INTERNAL MEDICINE

## 2020-06-12 PROCEDURE — 1101F PR PT FALLS ASSESS DOC 0-1 FALLS W/OUT INJ PAST YR: ICD-10-PCS | Mod: CPTII,S$GLB,, | Performed by: INTERNAL MEDICINE

## 2020-06-12 PROCEDURE — 1125F AMNT PAIN NOTED PAIN PRSNT: CPT | Mod: S$GLB,,, | Performed by: INTERNAL MEDICINE

## 2020-06-12 PROCEDURE — 99999 PR PBB SHADOW E&M-EST. PATIENT-LVL V: ICD-10-PCS | Mod: PBBFAC,,, | Performed by: INTERNAL MEDICINE

## 2020-06-12 PROCEDURE — 99213 PR OFFICE/OUTPT VISIT, EST, LEVL III, 20-29 MIN: ICD-10-PCS | Mod: S$GLB,,, | Performed by: INTERNAL MEDICINE

## 2020-06-12 PROCEDURE — 99213 OFFICE O/P EST LOW 20 MIN: CPT | Mod: S$GLB,,, | Performed by: INTERNAL MEDICINE

## 2020-06-12 PROCEDURE — 1125F PR PAIN SEVERITY QUANTIFIED, PAIN PRESENT: ICD-10-PCS | Mod: S$GLB,,, | Performed by: INTERNAL MEDICINE

## 2020-06-12 PROCEDURE — 1101F PT FALLS ASSESS-DOCD LE1/YR: CPT | Mod: CPTII,S$GLB,, | Performed by: INTERNAL MEDICINE

## 2020-06-12 PROCEDURE — 99999 PR PBB SHADOW E&M-EST. PATIENT-LVL V: CPT | Mod: PBBFAC,,, | Performed by: INTERNAL MEDICINE

## 2020-06-12 PROCEDURE — 82962 GLUCOSE BLOOD TEST: CPT | Mod: S$GLB,,, | Performed by: INTERNAL MEDICINE

## 2020-06-12 RX ORDER — METFORMIN HYDROCHLORIDE 500 MG/1
500 TABLET ORAL 2 TIMES DAILY WITH MEALS
Qty: 60 TABLET | Refills: 3 | Status: SHIPPED | OUTPATIENT
Start: 2020-06-12 | End: 2020-06-24 | Stop reason: SDUPTHER

## 2020-06-12 NOTE — PROGRESS NOTES
Subjective:       Patient ID: Melissa Sood is a 74 y.o. female.    Chief Complaint: Follow-up (hospital follow up)    HPI  patient is here with her daughter in law a visit quite digital manual disimpaction patient currently on Colace and MiraLax which seem to help and she having bowel movement every day her glucose level also has been elevated above the 200s she only take metformin 1 a day she has history of metastatic breast cancer currently on oral chemo pigray andaromasin.  Physically patient doing well no physical complain she is on Norco 5 mg p.r.n. for pain could be the cause of her constipation she denies short of breath chest pain nausea vomiting fever patient also need Accu-Chek machine to check her glucose at home since the it has been elevated patient will care for by family  Review of Systems    Objective:      Physical Exam  Vitals signs and nursing note reviewed.   Constitutional:       General: She is not in acute distress.     Appearance: She is well-developed.   HENT:      Head: Normocephalic and atraumatic.      Right Ear: External ear normal.      Left Ear: External ear normal.      Nose: Nose normal.      Mouth/Throat:      Pharynx: No oropharyngeal exudate.   Eyes:      General:         Right eye: No discharge.         Left eye: No discharge.      Conjunctiva/sclera: Conjunctivae normal.      Pupils: Pupils are equal, round, and reactive to light.   Neck:      Musculoskeletal: Normal range of motion and neck supple.      Thyroid: No thyromegaly.   Cardiovascular:      Rate and Rhythm: Normal rate and regular rhythm.      Heart sounds: Normal heart sounds. No murmur. No friction rub. No gallop.    Pulmonary:      Effort: Pulmonary effort is normal. No respiratory distress.      Breath sounds: Normal breath sounds. No wheezing or rales.   Chest:      Chest wall: No tenderness.   Abdominal:      General: Bowel sounds are normal. There is no distension.      Palpations: Abdomen is soft.       Tenderness: There is no abdominal tenderness. There is no guarding or rebound.   Musculoskeletal: Normal range of motion.         General: No tenderness or deformity.   Lymphadenopathy:      Cervical: No cervical adenopathy.   Skin:     General: Skin is warm and dry.      Capillary Refill: Capillary refill takes less than 2 seconds.      Findings: No erythema or rash.   Neurological:      Mental Status: She is alert and oriented to person, place, and time.   Psychiatric:         Thought Content: Thought content normal.         Judgment: Judgment normal.         Assessment:       1. Type 2 diabetes mellitus with hyperlipidemia    2. Constipation, unspecified constipation type    3. Malignant neoplasm of upper-outer quadrant of right breast in female, estrogen receptor positive        Plan:       Type 2 diabetes mellitus with hyperlipidemia  Comments:  Increase metformin to 500 twice a day order Accu-Chek machine to do Accu-Chek twice a day and record to bring to office next visit  Orders:  -     Ambulatory referral/consult to Endocrinology; Future; Expected date: 06/18/2020  -     POCT Glucose, Hand-Held Device  -     metFORMIN (GLUCOPHAGE) 500 MG tablet; Take 1 tablet (500 mg total) by mouth 2 (two) times daily with meals.  Dispense: 60 tablet; Refill: 3    Constipation, unspecified constipation type  Comments:  Continue with a high-fiber diet Colace and MiraLax    Malignant neoplasm of upper-outer quadrant of right breast in female, estrogen receptor positive  Comments:  Continue with treatment as per Oncology

## 2020-06-16 ENCOUNTER — TELEPHONE (OUTPATIENT)
Dept: HEMATOLOGY/ONCOLOGY | Facility: CLINIC | Age: 75
End: 2020-06-16

## 2020-06-16 DIAGNOSIS — C50.911 RECURRENT MALIGNANT NEOPLASM OF RIGHT BREAST: Chronic | ICD-10-CM

## 2020-06-16 RX ORDER — HYDROCODONE BITARTRATE AND ACETAMINOPHEN 5; 325 MG/1; MG/1
1 TABLET ORAL EVERY 6 HOURS PRN
Qty: 60 TABLET | Refills: 0 | Status: SHIPPED | OUTPATIENT
Start: 2020-06-16 | End: 2020-06-29 | Stop reason: SDUPTHER

## 2020-06-16 NOTE — TELEPHONE ENCOUNTER
----- Message from Susana Chaney, Patient Care Assistant sent at 6/16/2020 12:27 PM CDT -----  Patient Daughter(Britney) called in for a prescription refill= Norco 5mg, she would like to know if she can be given a 60 day supply, she states that due to her working its hard to keep up.  She can be reached at [phone]

## 2020-06-16 NOTE — TELEPHONE ENCOUNTER
I SPOKE TO PATIENTS DAUGHTER NIURKA AND NOTIFIED HER THAT DR. VASQUEZ CANNOT WRITE FOR A 2 MONTH SUPPLY OF HER PAIN MEDICATION.

## 2020-06-16 NOTE — TELEPHONE ENCOUNTER
----- Message from Susana Chaney, Patient Care Assistant sent at 6/16/2020 12:27 PM CDT -----  Patient Daughter(Britney) called in for a prescription refill= Norco 5mg, she would like to know if she can be given a 60 day supply, she states that due to her working its hard to keep up.  She can be reached at [phone]

## 2020-06-22 ENCOUNTER — PATIENT OUTREACH (OUTPATIENT)
Dept: ADMINISTRATIVE | Facility: OTHER | Age: 75
End: 2020-06-22

## 2020-06-22 DIAGNOSIS — E78.5 TYPE 2 DIABETES MELLITUS WITH HYPERLIPIDEMIA: Primary | ICD-10-CM

## 2020-06-22 DIAGNOSIS — Z12.11 COLON CANCER SCREENING: ICD-10-CM

## 2020-06-22 DIAGNOSIS — E11.69 TYPE 2 DIABETES MELLITUS WITH HYPERLIPIDEMIA: Primary | ICD-10-CM

## 2020-06-24 ENCOUNTER — OFFICE VISIT (OUTPATIENT)
Dept: DIABETES | Facility: CLINIC | Age: 75
End: 2020-06-24
Payer: MEDICARE

## 2020-06-24 VITALS
HEART RATE: 67 BPM | SYSTOLIC BLOOD PRESSURE: 117 MMHG | BODY MASS INDEX: 23.69 KG/M2 | HEIGHT: 62 IN | WEIGHT: 128.75 LBS | OXYGEN SATURATION: 98 % | DIASTOLIC BLOOD PRESSURE: 58 MMHG

## 2020-06-24 DIAGNOSIS — E78.5 HYPERLIPIDEMIA, UNSPECIFIED HYPERLIPIDEMIA TYPE: ICD-10-CM

## 2020-06-24 DIAGNOSIS — E78.5 TYPE 2 DIABETES MELLITUS WITH HYPERLIPIDEMIA: ICD-10-CM

## 2020-06-24 DIAGNOSIS — E11.69 TYPE 2 DIABETES MELLITUS WITH HYPERLIPIDEMIA: ICD-10-CM

## 2020-06-24 DIAGNOSIS — E11.65 TYPE 2 DIABETES MELLITUS WITH HYPERGLYCEMIA, WITHOUT LONG-TERM CURRENT USE OF INSULIN: Primary | ICD-10-CM

## 2020-06-24 DIAGNOSIS — G30.9 ALZHEIMER'S DEMENTIA WITHOUT BEHAVIORAL DISTURBANCE, UNSPECIFIED TIMING OF DEMENTIA ONSET: ICD-10-CM

## 2020-06-24 DIAGNOSIS — Z71.9 HEALTH EDUCATION/COUNSELING: ICD-10-CM

## 2020-06-24 DIAGNOSIS — F02.80 ALZHEIMER'S DEMENTIA WITHOUT BEHAVIORAL DISTURBANCE, UNSPECIFIED TIMING OF DEMENTIA ONSET: ICD-10-CM

## 2020-06-24 PROCEDURE — 1125F AMNT PAIN NOTED PAIN PRSNT: CPT | Mod: S$GLB,,, | Performed by: NURSE PRACTITIONER

## 2020-06-24 PROCEDURE — 1101F PR PT FALLS ASSESS DOC 0-1 FALLS W/OUT INJ PAST YR: ICD-10-PCS | Mod: CPTII,S$GLB,, | Performed by: NURSE PRACTITIONER

## 2020-06-24 PROCEDURE — 99214 OFFICE O/P EST MOD 30 MIN: CPT | Mod: S$GLB,,, | Performed by: NURSE PRACTITIONER

## 2020-06-24 PROCEDURE — 1159F PR MEDICATION LIST DOCUMENTED IN MEDICAL RECORD: ICD-10-PCS | Mod: S$GLB,,, | Performed by: NURSE PRACTITIONER

## 2020-06-24 PROCEDURE — 99999 PR PBB SHADOW E&M-EST. PATIENT-LVL IV: CPT | Mod: PBBFAC,,, | Performed by: NURSE PRACTITIONER

## 2020-06-24 PROCEDURE — 1125F PR PAIN SEVERITY QUANTIFIED, PAIN PRESENT: ICD-10-PCS | Mod: S$GLB,,, | Performed by: NURSE PRACTITIONER

## 2020-06-24 PROCEDURE — 99999 PR PBB SHADOW E&M-EST. PATIENT-LVL IV: ICD-10-PCS | Mod: PBBFAC,,, | Performed by: NURSE PRACTITIONER

## 2020-06-24 PROCEDURE — 1101F PT FALLS ASSESS-DOCD LE1/YR: CPT | Mod: CPTII,S$GLB,, | Performed by: NURSE PRACTITIONER

## 2020-06-24 PROCEDURE — 99214 PR OFFICE/OUTPT VISIT, EST, LEVL IV, 30-39 MIN: ICD-10-PCS | Mod: S$GLB,,, | Performed by: NURSE PRACTITIONER

## 2020-06-24 PROCEDURE — 1159F MED LIST DOCD IN RCRD: CPT | Mod: S$GLB,,, | Performed by: NURSE PRACTITIONER

## 2020-06-24 RX ORDER — METFORMIN HYDROCHLORIDE 500 MG/1
1000 TABLET ORAL 2 TIMES DAILY WITH MEALS
Qty: 120 TABLET | Refills: 3 | Status: SHIPPED | OUTPATIENT
Start: 2020-06-24 | End: 2020-07-17

## 2020-06-24 RX ORDER — LANCETS 28 GAUGE
1 EACH MISCELLANEOUS
Qty: 150 EACH | Refills: 11 | Status: SHIPPED | OUTPATIENT
Start: 2020-06-24 | End: 2021-01-01

## 2020-06-24 RX ORDER — PEN NEEDLE, DIABETIC 30 GX3/16"
NEEDLE, DISPOSABLE MISCELLANEOUS
Qty: 100 EACH | Refills: 3 | Status: SHIPPED | OUTPATIENT
Start: 2020-06-24 | End: 2021-06-30 | Stop reason: SDUPTHER

## 2020-06-24 RX ORDER — INSULIN GLARGINE 100 [IU]/ML
10 INJECTION, SOLUTION SUBCUTANEOUS NIGHTLY
Qty: 1 BOX | Refills: 3 | Status: SHIPPED | OUTPATIENT
Start: 2020-06-24 | End: 2021-06-30 | Stop reason: SDUPTHER

## 2020-06-24 NOTE — ASSESSMENT & PLAN NOTE
May hinder overall diabetes management  Patient lives alone but has family members at care for her

## 2020-06-24 NOTE — ASSESSMENT & PLAN NOTE
Uncontrolled-- likely related to Piqray for cancer treatment and dietary indiscretions  Previously well controlled on just low-dose metformin  Would benefit from NovoLog/Humalog sliding scale--however daughter-in-law reports that it will be very difficult as she has different caregivers throughout the day and patient is more of a Grazer.  It will be very difficult for them to get a pre meal blood sugar reading--concerned that the NovoLog/Humalog sliding scale would be use then to treat a postprandial blood sugar reading which would increase the risk for hypoglycemia.    Review diabetic diet with patient and daughter in-law   Strongly encouraged to eliminate sugary beverages and to significantly cut back on whole milk consumption.  Suggested switching to at least 2% or skim milk.-cut back the intake  Diabetes diet references provided to patient and daughter in law      -- Medication Changes:  Maximize metformin to a 1000 mg b.i.d.--titrate up slowly--specific instructions given to patient's daughter-in-law  Start Lantus 10 units daily    Injection teaching and technique reviewed thoroughly with daughter-in-law in clinic today.  Her daughter-in-law will be the one to give her the daily insulin injection.       -- Reviewed goals of therapy are to get the best control we can without hypoglycemia.  -- Reviewed patient's current insulin regimen. Clarified proper insulin dose and timing in relation to meals, etc. Insulin injection sites and proper rotation instructed.    -- Advised frequent self blood glucose monitoring.  Patient encouraged to document glucose results and bring them to every clinic visit. -ideally 3 times per day before meals--logs provided--send logs p.r.n. via the portal  -- Hypoglycemia precautions discussed. Instructed on precautions before driving.    -- Call for Bg repeatedly < 90 or > 180.   -- Close adherence to lifestyle changes recommended.   -- Periodic follow ups for eye evaluations, foot care  and dental care suggested.

## 2020-06-24 NOTE — PROGRESS NOTES
CC:   Chief Complaint   Patient presents with    Diabetes Mellitus     Pt thinks that her meds from  for her neck cancer is affecting her bg levels. Pts daughter in law is concerned with the pts dementia also affecting the pts bg. She forgets when she, what, times ect she eats.       HPI: Melissa Sood is a 74 y.o. female presents for an initial visit today for the management of T2DM.     She was diagnosed with Type 2 diabetes about 10 years ago on routine lab work. She was started on metformin at the time of diagnosis. She has only ever been on Metformin for diabetes management. Never on insulin therapy.     Family hx of diabetes: a couple of siblings - she is 1 out 13.   Hospitalized for diabetes: denies     No personal or FH of thyroid cancer or personal of pancreatic cancer or pancreatitis.     Her diabetes has been well-controlled on low-dose metformin daily-until the past couple of months when her cancer regimen was changed. She is being treated for recurrent breast cancer with metastasis with Piqray-- she could not tolerate the 300 mg dose (hyperglycemia, constipation, weight loss) and was lowered to the 150 mg daily dose a couple of weeks ago.   No radiation treatment.   She presents today with her daughter-in-law Suzanne- who reports that her blood sugar readings have improved since they cut the Piqray down to 150 mg daily.  She saw Dr. Longoria about 2 weeks ago who instructed her to increase her metformin to b.i.d.. Suzanne reports that they continued on the 500 mg daily dose instead of BID  Patient has a history of dementia--she is pleasantly confused during our visit today--her family is taking care of her.  Her daughter-in-law reports that it is difficult as there are different caregivers throughout the day.  They may work on hiring someone to care for her throughout the day.  Patient does attest to a high carbohydrate diet--and is also drinking sugary beverages such as sweet tea and root beer  as well as drinking whole milk multiple times a day.  It will be difficult to use NovoLog or Humalog sliding scale as patient grazes throughout the day and does not have a consistent caregiver present to check her blood sugars before meals and administer sliding scale insulin.        DIABETES COMPLICATIONS: none      Diabetes Management Status    ASA:  Yes - 81 mg daily     Statin: Taking  ACE/ARB: Not taking    Screening or Prevention Patient's value Goal Complete/Controlled?   HgA1C Testing and Control   Lab Results   Component Value Date    HGBA1C 5.9 03/26/2020      Annually/Less than 8% Yes   Lipid profile : 04/16/2019 Annually No   LDL control Lab Results   Component Value Date    LDLCALC 44 04/16/2019    Annually/Less than 100 mg/dl  No   Nephropathy screening Lab Results   Component Value Date    LABMICR 8.0 04/16/2019     Lab Results   Component Value Date    PROTEINUA Trace (A) 03/16/2020    Annually Yes   Blood pressure BP Readings from Last 1 Encounters:   06/24/20 (!) 117/58    Less than 140/90 Yes   Dilated retinal exam Most Recent Eye Exam Date: Not Found Annually Yes   Foot exam   : 06/24/2020 Annually No       CURRENT A1C:    Hemoglobin A1C   Date Value Ref Range Status   03/26/2020 5.9 4.5 - 6.2 % Final     Comment:     According to ADA guidelines, hemoglobin A1C <7.0% represents  optimal control in non-pregnant diabetic patients.  Different  metrics may apply to specific populations.   Standards of Medical Care in Diabetes - 2016.  For the purpose of screening for the presence of diabetes:  <5.7%     Consistent with the absence of diabetes  5.7-6.4%  Consistent with increasing risk for diabetes   (prediabetes)  >or=6.5%  Consistent with diabetes  Currently no consensus exists for use of hemoglobin A1C  for diagnosis of diabetes for children.     04/16/2019 5.9 (H) 4.0 - 5.6 % Final     Comment:     ADA Screening Guidelines:  5.7-6.4%  Consistent with prediabetes  >or=6.5%  Consistent with  diabetes  High levels of fetal hemoglobin interfere with the HbA1C  assay. Heterozygous hemoglobin variants (HbS, HgC, etc)do  not significantly interfere with this assay.   However, presence of multiple variants may affect accuracy.     01/30/2018 5.9 (H) 4.0 - 5.6 % Final     Comment:     According to ADA guidelines, hemoglobin A1c <7.0% represents  optimal control in non-pregnant diabetic patients. Different  metrics may apply to specific patient populations.   Standards of Medical Care in Diabetes-2016.  For the purpose of screening for the presence of diabetes:  <5.7%     Consistent with the absence of diabetes  5.7-6.4%  Consistent with increasing risk for diabetes   (prediabetes)  >or=6.5%  Consistent with diabetes  Currently, no consensus exists for use of hemoglobin A1c  for diagnosis of diabetes for children.  This Hemoglobin A1c assay has significant interference with fetal   hemoglobin   (HbF). The results are invalid for patients with abnormal amounts of   HbF,   including those with known Hereditary Persistence   of Fetal Hemoglobin. Heterozygous hemoglobin variants (HbAS, HbAC,   HbAD, HbAE, HbA2) do not significantly interfere with this assay;   however, presence of multiple variants in a sample may impact the %   interference.         GOAL A1C: 7% without hypoglycemia      DM MEDICATIONS USED IN THE PAST: Metformin     CURRENT DIABETES MEDICATIONS: Metformin 500 mg daily   Insulin: N/A   Missed doses: denies       BLOOD GLUCOSE MONITORING:  Checking 2 times per day   She presents with logs from home for review.   Some readings may be post prandial as she grazes throughout the day             HYPOGLYCEMIA:  No      MEALS:   Most of her eating is in the evening   Grazing throughout the day --- sun chips and grapes   BF: toast and jelly or sometimes skips  Drinks: whole milk-  coffee in the AM   Regular soft drinks -- root bear   Sweet tea   Water        CURRENT EXERCISE:  No    Review of  Systems  Review of Systems   Constitutional: Positive for appetite change and unexpected weight change. Negative for fatigue.   HENT: Negative for trouble swallowing.    Eyes: Negative for visual disturbance.   Respiratory: Negative for shortness of breath.    Cardiovascular: Negative for chest pain.   Gastrointestinal: Negative for nausea.   Endocrine: Negative for polydipsia, polyphagia and polyuria.   Genitourinary:        No Nocturia    Musculoskeletal: Positive for neck pain.   Skin: Negative for wound.   Neurological: Negative for numbness.        Forgetful    Psychiatric/Behavioral: Positive for confusion.       Physical Exam   Physical Exam  Vitals signs and nursing note reviewed.   Constitutional:       Appearance: She is well-developed.   HENT:      Head: Normocephalic and atraumatic.      Right Ear: External ear normal.      Left Ear: External ear normal.      Nose: Nose normal.   Neck:      Musculoskeletal: Normal range of motion and neck supple.      Thyroid: No thyromegaly.      Trachea: No tracheal deviation.   Cardiovascular:      Rate and Rhythm: Normal rate and regular rhythm.      Heart sounds: No murmur.   Pulmonary:      Effort: Pulmonary effort is normal. No respiratory distress.      Breath sounds: Normal breath sounds.   Abdominal:      Palpations: Abdomen is soft.      Tenderness: There is no abdominal tenderness.      Hernia: No hernia is present.   Skin:     General: Skin is warm and dry.      Capillary Refill: Capillary refill takes less than 2 seconds.      Findings: No rash.   Neurological:      Mental Status: She is alert.      Cranial Nerves: No cranial nerve deficit.      Comments: Pleasantly confused--history of dementia   Psychiatric:         Behavior: Behavior normal.         Judgment: Judgment normal.         FOOT EXAMINATION:  Appropriate footwear     Protective Sensation (w/ 10 gram monofilament):  Right: Intact  Left: Intact    Visual Inspection:  Normal -  Bilateral and Nails  Intact - without Evidence of Foot Deformity- Bilateral    Pedal Pulses:   Right: Present  Left: Present     Posterior tibialis:   Right:Present  Left: Present        Lab Results   Component Value Date    TSH 1.354 01/30/2018         Type 2 diabetes mellitus with hyperglycemia, without long-term current use of insulin  Uncontrolled-- likely related to Piqray for cancer treatment and dietary indiscretions  Previously well controlled on just low-dose metformin  Would benefit from NovoLog/Humalog sliding scale--however daughter-in-law reports that it will be very difficult as she has different caregivers throughout the day and patient is more of a Grazer.  It will be very difficult for them to get a pre meal blood sugar reading--concerned that the NovoLog/Humalog sliding scale would be use then to treat a postprandial blood sugar reading which would increase the risk for hypoglycemia.    Review diabetic diet with patient and daughter in-law   Strongly encouraged to eliminate sugary beverages and to significantly cut back on whole milk consumption.  Suggested switching to at least 2% or skim milk.-cut back the intake  Diabetes diet references provided to patient and daughter in law      -- Medication Changes:  Maximize metformin to a 1000 mg b.i.d.--titrate up slowly--specific instructions given to patient's daughter-in-law  Start Lantus 10 units daily    Injection teaching and technique reviewed thoroughly with daughter-in-law in clinic today.  Her daughter-in-law will be the one to give her the daily insulin injection.       -- Reviewed goals of therapy are to get the best control we can without hypoglycemia.  -- Reviewed patient's current insulin regimen. Clarified proper insulin dose and timing in relation to meals, etc. Insulin injection sites and proper rotation instructed.    -- Advised frequent self blood glucose monitoring.  Patient encouraged to document glucose results and bring them to every clinic visit.  -ideally 3 times per day before meals--logs provided--send logs p.r.n. via the portal  -- Hypoglycemia precautions discussed. Instructed on precautions before driving.    -- Call for Bg repeatedly < 90 or > 180.   -- Close adherence to lifestyle changes recommended.   -- Periodic follow ups for eye evaluations, foot care and dental care suggested.          Hyperlipidemia  On statin per ADA recommendations  LDL goal < 100. LDL at goal. LFTs WNL. Continue statin.     Alzheimer's disease  May hinder overall diabetes management  Patient lives alone but has family members at care for her      Spent 45 minutes with patient with >50% time spent in counseling as noted above        Follow up in about 4 weeks (around 7/22/2020).   BG log review.   Will need Urine MAC at RTC     No orders of the defined types were placed in this encounter.      Recommendations were discussed with the patient in detail  The patient verbalized understanding and agrees with the plan outlined as above.

## 2020-06-24 NOTE — PATIENT INSTRUCTIONS
Metformin 500mg pills:  1 pill with breakfast, 1 pill with dinner for next 3-4 days THEN  1 pill with breakfast, 2 pills with dinner for next 3-4 days THEN  2 pills with breakfast, 2 pills with dinner     Start Lantus 10 units daily     Check blood sugar 3 times per day before meals.           Snacks can be an important part of a balanced, healthy meal plan. They allow you to eat more frequently, feeling full and satisfied throughout the day. Also, they allow you to spread carbohydrates evenly, which may stabilize blood sugars.  Plus, snacks are enjoyable!     The amount of carbohydrate needed at snacks varies. Generally, less than 15 grams of carbohydrate per snack is recommended.  Below you will find some tasty treats.       0-5 gm carb   Crystal Light   Vitamin Water Zero   Herbal tea, unsweetened   2 tsp peanut butter on celery   1./2 cup sugar-free jell-o   1 sugar-free popsicle   ¼ cup blueberries   8oz Blue Lisa unsweetened almond milk   5 baby carrots & celery sticks, cucumbers, bell peppers dipped in ¼ cup salsa, 2Tbsp light ranch dressing or 2Tbsp plain Greek yogurt   10 Goldfish crackers   ½ oz low-fat cheese or string cheese   1 closed handful of nuts, unsalted   1 Tbsp of sunflower seeds, unsalted   1 cup Smart Pop popcorn   1 whole grain brown rice cake        15 gm carb   1 small piece of fruit or ½ banana or 1/2 cup lite canned fruit   3 radha cracker squares   3 cups Smart Pop popcorn, top spray butter, Sarmiento lite salt or cinnamon and Truvia   5 Vanilla Wafers   ½ cup low fat, no added sugar ice cream or frozen yogurt (Blue bell, Blue Bunny, Weight Watchers, Skinny Cow)   ½ turkey, ham, or chicken sandwich   ½ c fruit with ½ c Cottage cheese   4-6 unsalted wheat crackers with 1 oz low fat cheese or 1 tbsp peanut butter    30-45 goldfish crackers (depending on flavor)    7-8 Church mini brown rice cakes (caramel, apple cinnamon, chocolate)    12 Church mini brown rice  cakes (cheddar, bbq, ranch)    1/3 cup hummus dip with raw veg   1/2 whole wheat mikel, 1Tbsp hummus   Mini Pizza (1/2 whole wheat English muffin, low-fat  cheese, tomato sauce)   100 calorie snack pack (Oreo, Chips Ahoy, Ritz Mix, Baked Cheetos)   4-6 oz. light or Greek Style yogurt (Chobani, Yoplait, Okios, [place])   ½ cup sugar-free pudding     6 in. wheat tortilla or mikel oven toasted chips (topped with spray butter flavoring, cinnamon, Truvia OR spray butter, garlic powder, chili powder)    18 BBQ Popchips (available at [employer], [employer], Fresh Market)

## 2020-06-29 ENCOUNTER — OFFICE VISIT (OUTPATIENT)
Dept: HEMATOLOGY/ONCOLOGY | Facility: CLINIC | Age: 75
End: 2020-06-29
Payer: MEDICARE

## 2020-06-29 VITALS
TEMPERATURE: 97 F | BODY MASS INDEX: 23.37 KG/M2 | WEIGHT: 127.81 LBS | RESPIRATION RATE: 16 BRPM | HEART RATE: 69 BPM | SYSTOLIC BLOOD PRESSURE: 116 MMHG | DIASTOLIC BLOOD PRESSURE: 63 MMHG

## 2020-06-29 DIAGNOSIS — F02.80 ALZHEIMER'S DEMENTIA WITHOUT BEHAVIORAL DISTURBANCE, UNSPECIFIED TIMING OF DEMENTIA ONSET: ICD-10-CM

## 2020-06-29 DIAGNOSIS — C50.411 MALIGNANT NEOPLASM OF UPPER-OUTER QUADRANT OF RIGHT BREAST IN FEMALE, ESTROGEN RECEPTOR POSITIVE: Primary | ICD-10-CM

## 2020-06-29 DIAGNOSIS — C50.911 RECURRENT MALIGNANT NEOPLASM OF RIGHT BREAST: Chronic | ICD-10-CM

## 2020-06-29 DIAGNOSIS — Z17.0 MALIGNANT NEOPLASM OF UPPER-OUTER QUADRANT OF RIGHT BREAST IN FEMALE, ESTROGEN RECEPTOR POSITIVE: Primary | ICD-10-CM

## 2020-06-29 DIAGNOSIS — G30.9 ALZHEIMER'S DEMENTIA WITHOUT BEHAVIORAL DISTURBANCE, UNSPECIFIED TIMING OF DEMENTIA ONSET: ICD-10-CM

## 2020-06-29 PROCEDURE — 99214 PR OFFICE/OUTPT VISIT, EST, LEVL IV, 30-39 MIN: ICD-10-PCS | Mod: S$GLB,,, | Performed by: INTERNAL MEDICINE

## 2020-06-29 PROCEDURE — 1159F PR MEDICATION LIST DOCUMENTED IN MEDICAL RECORD: ICD-10-PCS | Mod: S$GLB,,, | Performed by: INTERNAL MEDICINE

## 2020-06-29 PROCEDURE — 1126F PR PAIN SEVERITY QUANTIFIED, NO PAIN PRESENT: ICD-10-PCS | Mod: S$GLB,,, | Performed by: INTERNAL MEDICINE

## 2020-06-29 PROCEDURE — 1126F AMNT PAIN NOTED NONE PRSNT: CPT | Mod: S$GLB,,, | Performed by: INTERNAL MEDICINE

## 2020-06-29 PROCEDURE — 1159F MED LIST DOCD IN RCRD: CPT | Mod: S$GLB,,, | Performed by: INTERNAL MEDICINE

## 2020-06-29 PROCEDURE — 1101F PR PT FALLS ASSESS DOC 0-1 FALLS W/OUT INJ PAST YR: ICD-10-PCS | Mod: S$GLB,,, | Performed by: INTERNAL MEDICINE

## 2020-06-29 PROCEDURE — 1101F PT FALLS ASSESS-DOCD LE1/YR: CPT | Mod: S$GLB,,, | Performed by: INTERNAL MEDICINE

## 2020-06-29 PROCEDURE — 99214 OFFICE O/P EST MOD 30 MIN: CPT | Mod: S$GLB,,, | Performed by: INTERNAL MEDICINE

## 2020-06-29 RX ORDER — HYDROCODONE BITARTRATE AND ACETAMINOPHEN 5; 325 MG/1; MG/1
1 TABLET ORAL EVERY 6 HOURS PRN
Qty: 60 TABLET | Refills: 0 | Status: SHIPPED | OUTPATIENT
Start: 2020-06-29 | End: 2021-01-01 | Stop reason: SDUPTHER

## 2020-06-29 NOTE — ASSESSMENT & PLAN NOTE
Patient is doing better on the 150mg dose of Piqray.  Her sugar is improving but still often above 200.  She is on insulin per PCP.

## 2020-06-29 NOTE — PROGRESS NOTES
PROGRESS NOTE    Subjective:       Patient ID: Melissa Sood is a 74 y.o. female.    Breast Cancer:  Dx: 2003  T2N1M0 stage II  neoadj AC-T, surgery-XRT Arimidex-Femara till 2/2011  BRCA 1/2 neg  ER pos, Her 2 neg  Recurrence 3/2018  Started Ibrance/Exemestane 4/30/2018-5/1/2020.  Piqray 5/15/2020- 6/7/2020(held due to high glu)    Chief Complaint:  No chief complaint on file.  breast cancer follow up.      History of Present Illness:   Melissa Sood is a 74 y.o. female who presents for follow up    Patient now on 150mg of piqray and is tolerating this better.  Blood sugar is improved but often still running over 200.      PIK3CA--path 4/2/2018    PET impression 12/13/2019:  1. Two new small hypermetabolic masses in the right supraclavicular region and in the superior mediastinum, suspicious for new sites of metastatic disease.  2. No additional evidence of active metastatic disease.  3. Stable diffuse osseous metastases without FDG hypermetabolism, compatible with treated or quiescent disease.    Labs 6/24/2019:  WBC 2.5(ANC 1.3)  Hb 11.6  Plt: 110--228  CMP unremark  CA 15-3:  42.9--44--55  CA 27.29: 62.5--68--69      PET scan impression 3/15/2019:  IMPRESSION: Stable sclerotic foci throughout the axial skeleton with no FDG  activity compatible with treated metastasis    No evidence of recurrent or new metastatic disease    Prior cholecystectomy and hysterectomy    Family and Social history reviewed and is unchanged from 9/22/2014      ROS:  Review of Systems   Constitutional: Negative for fever.   Respiratory: Negative for shortness of breath.    Cardiovascular: Negative for chest pain and leg swelling.   Gastrointestinal: Negative for abdominal pain and blood in stool.   Genitourinary: Negative for hematuria.   Skin: Negative for rash.          Current Outpatient Medications:     alpelisib (PIQRAY) 300 mg/day (150 mg x 2) Tab, Take 300 mg by mouth once  daily., Disp: 60 tablet, Rfl: 6    ALPRAZolam (XANAX) 0.25 MG tablet, TAKE 1 TABLET BY MOUTH THREE TIMES DAILY AS NEEDED FOR ANXIETY, Disp: 30 tablet, Rfl: 0    aspirin (ECOTRIN) 81 MG EC tablet, Take 81 mg by mouth once daily., Disp: , Rfl:     atorvastatin (LIPITOR) 20 MG tablet, TAKE 1 TABLET BY MOUTH ONCE DAILY, Disp: 90 tablet, Rfl: 3    blood sugar diagnostic Strp, 1 strip by Misc.(Non-Drug; Combo Route) route 4 (four) times daily with meals and nightly., Disp: 150 each, Rfl: 11    blood-glucose meter (FREESTYLE SYSTEM KIT) kit, Use as instructed, Disp: 1 each, Rfl: 0    docusate sodium (COLACE) 100 MG capsule, Take 1 capsule (100 mg total) by mouth 2 (two) times daily., Disp: 60 capsule, Rfl: 0    donepezil (ARICEPT) 10 MG tablet, Take 1 tablet by mouth every evening., Disp: , Rfl:     escitalopram oxalate (LEXAPRO) 20 MG tablet, TAKE 1 TABLET BY MOUTH EVERY DAY, Disp: 90 tablet, Rfl: 0    escitalopram oxalate (LEXAPRO) 20 MG tablet, TAKE 1 TABLET BY MOUTH EVERY DAY, Disp: 90 tablet, Rfl: 0    exemestane (AROMASIN) 25 mg tablet, TAKE 1 TABLET BY MOUTH EVERY DAY, Disp: 30 tablet, Rfl: 11    HYDROcodone-acetaminophen (NORCO) 5-325 mg per tablet, Take 1 tablet by mouth every 6 (six) hours as needed for Pain., Disp: 60 tablet, Rfl: 0    insulin (LANTUS SOLOSTAR U-100 INSULIN) glargine 100 units/mL (3mL) SubQ pen, Inject 10 Units into the skin every evening. Titrate up to FBG <150. Max TDD of 50 units, Disp: 1 Box, Rfl: 3    lancets (FREESTYLE LANCETS) 28 gauge Misc, 1 lancet by Misc.(Non-Drug; Combo Route) route 4 (four) times daily with meals and nightly., Disp: 150 each, Rfl: 11    memantine (NAMENDA XR) 28 mg CSpX, memantine 28 mg capsule sprinkle,extended release 24hr  TAKE 1 CAPSULE BY MOUTH EVERY DAY, Disp: , Rfl:     metFORMIN (GLUCOPHAGE) 500 MG tablet, Take 2 tablets (1,000 mg total) by mouth 2 (two) times daily with meals., Disp: 120 tablet, Rfl: 3    ondansetron (ZOFRAN) 8 MG tablet,  "Take 1 tablet (8 mg total) by mouth every 8 (eight) hours as needed for Nausea., Disp: 30 tablet, Rfl: 5    pen needle, diabetic (BD ULTRA-FINE SHARA PEN NEEDLE) 32 gauge x 5/32" Ndle, To use daily with insulin injection, Disp: 100 each, Rfl: 3    polyethylene glycol (GLYCOLAX) 17 gram/dose powder, Take 17 g by mouth once daily., Disp: 238 g, Rfl: 0    promethazine (PHENERGAN) 25 MG tablet, Take 1 tablet (25 mg total) by mouth every 4 to 6 hours as needed., Disp: 30 tablet, Rfl: 5    sertraline (ZOLOFT) 50 MG tablet, sertraline 50 mg tablet  TK 1 T PO QD FOR 2 WKS THEN TK 1 T PO BID, Disp: , Rfl:     cyanocobalamin 1,000 mcg/mL injection, ADM 1 ML IM Q WK, Disp: , Rfl: 6        Objective:       Physical Examination:     /63 (BP Location: Left arm, Patient Position: Sitting)   Pulse 69   Temp 97.1 °F (36.2 °C) (Oral)   Resp 16   Wt 58 kg (127 lb 12.8 oz)   BMI 23.37 kg/m²     Physical Exam  Constitutional:       Appearance: She is well-developed.   HENT:      Head: Normocephalic and atraumatic.      Right Ear: External ear normal.      Left Ear: External ear normal.   Eyes:      Conjunctiva/sclera: Conjunctivae normal.      Pupils: Pupils are equal, round, and reactive to light.   Neck:      Thyroid: No thyromegaly.      Trachea: No tracheal deviation.   Cardiovascular:      Rate and Rhythm: Normal rate and regular rhythm.      Heart sounds: Normal heart sounds.   Pulmonary:      Effort: Pulmonary effort is normal.      Breath sounds: Normal breath sounds.   Abdominal:      General: Bowel sounds are normal. There is no distension.      Palpations: Abdomen is soft. There is no mass.      Tenderness: There is no abdominal tenderness.   Skin:     Findings: No rash.   Neurological:      Comments: Neuro intact througout   Psychiatric:         Behavior: Behavior normal.         Thought Content: Thought content normal.         Judgment: Judgment normal.         Labs:   Recent Results (from the past 336 " hour(s))   CBC auto differential    Collection Time: 06/17/20  8:04 AM   Result Value Ref Range    WBC 4.80 3.90 - 12.70 K/uL    Hemoglobin 12.8 12.0 - 16.0 g/dL    Hematocrit 38.6 37.0 - 48.5 %    Platelets 238 150 - 350 K/uL     CMP  Sodium   Date Value Ref Range Status   06/17/2020 137 136 - 145 mmol/L Final   06/24/2019 141 134 - 144 mmol/L      Potassium   Date Value Ref Range Status   06/17/2020 3.9 3.5 - 5.1 mmol/L Final     Chloride   Date Value Ref Range Status   06/17/2020 101 101 - 111 mmol/L Final   06/24/2019 103 98 - 110 mmol/L      CO2   Date Value Ref Range Status   06/17/2020 25 23 - 29 mmol/L Final     Glucose   Date Value Ref Range Status   06/17/2020 263 (H) 74 - 118 mg/dL Final   06/24/2019 87 70 - 99 mg/dL      BUN, Bld   Date Value Ref Range Status   06/17/2020 14 8 - 23 mg/dL Final     Creatinine   Date Value Ref Range Status   06/17/2020 0.8 0.5 - 1.4 mg/dL Final   06/24/2019 0.82 0.60 - 1.40 mg/dL      Calcium   Date Value Ref Range Status   06/17/2020 8.8 8.6 - 10.0 mg/dL Final     Total Protein   Date Value Ref Range Status   06/17/2020 6.8 6.0 - 8.4 g/dL Final     Albumin   Date Value Ref Range Status   06/17/2020 4.1 3.5 - 5.2 g/dL Final   06/24/2019 4.3 3.1 - 4.7 g/dL      Total Bilirubin   Date Value Ref Range Status   06/17/2020 0.6 0.3 - 1.2 mg/dL Final     Comment:     For infants and newborns, interpretation of results should be based  on gestational age, weight and in agreement with clinical  observations.  Premature Infant recommended reference ranges:  Up to 24 hours.............<8.0 mg/dL  Up to 48 hours............<12.0 mg/dL  3-5 days..................<15.0 mg/dL  6-29 days.................<15.0 mg/dL       Alkaline Phosphatase   Date Value Ref Range Status   06/17/2020 65 38 - 126 U/L Final     AST   Date Value Ref Range Status   06/17/2020 22 15 - 41 U/L Final     ALT   Date Value Ref Range Status   06/17/2020 32 14 - 54 U/L Final     Anion Gap   Date Value Ref Range  Status   06/17/2020 11 8 - 16 mmol/L Final     eGFR if    Date Value Ref Range Status   06/17/2020 >60.0 >60 mL/min/1.73 m^2 Final     eGFR if non    Date Value Ref Range Status   06/17/2020 >60.0 >60 mL/min/1.73 m^2 Final     Comment:     Calculation used to obtain the estimated glomerular filtration  rate (eGFR) is the CKD-EPI equation.        Lab Results   Component Value Date    CEA 2.6 06/24/2019     No results found for: PSA        Assessment/Plan:     Problem List Items Addressed This Visit     Recurrent malignant neoplasm of right breast (Chronic)     Patient is doing better on the 150mg dose of Piqray.  Her sugar is improving but still often above 200.  She is on insulin per PCP.          Relevant Medications    HYDROcodone-acetaminophen (NORCO) 5-325 mg per tablet    Other Relevant Orders    NM PET CT Routine Skull to Mid Thigh    Malignant neoplasm of upper-outer quadrant of right breast in female, estrogen receptor positive - Primary    Relevant Orders    NM PET CT Routine Skull to Mid Thigh    Alzheimer's disease     Patient seems to be stable. Currently.  She has a very good support system with family.                Discussion:   High complexity, high risk medications.   Follow up in about 6 weeks (around 8/10/2020).      Electronically signed by Rudolph Bryan

## 2020-07-15 ENCOUNTER — PATIENT OUTREACH (OUTPATIENT)
Dept: ADMINISTRATIVE | Facility: OTHER | Age: 75
End: 2020-07-15

## 2020-07-17 ENCOUNTER — OFFICE VISIT (OUTPATIENT)
Dept: DIABETES | Facility: CLINIC | Age: 75
End: 2020-07-17
Payer: MEDICARE

## 2020-07-17 VITALS
HEART RATE: 64 BPM | SYSTOLIC BLOOD PRESSURE: 117 MMHG | OXYGEN SATURATION: 97 % | BODY MASS INDEX: 23.4 KG/M2 | WEIGHT: 127.19 LBS | DIASTOLIC BLOOD PRESSURE: 56 MMHG | HEIGHT: 62 IN

## 2020-07-17 DIAGNOSIS — F02.80 ALZHEIMER'S DEMENTIA WITHOUT BEHAVIORAL DISTURBANCE, UNSPECIFIED TIMING OF DEMENTIA ONSET: ICD-10-CM

## 2020-07-17 DIAGNOSIS — E78.5 HYPERLIPIDEMIA, UNSPECIFIED HYPERLIPIDEMIA TYPE: ICD-10-CM

## 2020-07-17 DIAGNOSIS — G30.9 ALZHEIMER'S DEMENTIA WITHOUT BEHAVIORAL DISTURBANCE, UNSPECIFIED TIMING OF DEMENTIA ONSET: ICD-10-CM

## 2020-07-17 DIAGNOSIS — C50.911 RECURRENT MALIGNANT NEOPLASM OF RIGHT BREAST: Chronic | ICD-10-CM

## 2020-07-17 DIAGNOSIS — E11.65 TYPE 2 DIABETES MELLITUS WITH HYPERGLYCEMIA, WITHOUT LONG-TERM CURRENT USE OF INSULIN: Primary | ICD-10-CM

## 2020-07-17 PROCEDURE — 99214 PR OFFICE/OUTPT VISIT, EST, LEVL IV, 30-39 MIN: ICD-10-PCS | Mod: S$GLB,,, | Performed by: NURSE PRACTITIONER

## 2020-07-17 PROCEDURE — 1126F AMNT PAIN NOTED NONE PRSNT: CPT | Mod: S$GLB,,, | Performed by: NURSE PRACTITIONER

## 2020-07-17 PROCEDURE — 99214 OFFICE O/P EST MOD 30 MIN: CPT | Mod: S$GLB,,, | Performed by: NURSE PRACTITIONER

## 2020-07-17 PROCEDURE — 1101F PR PT FALLS ASSESS DOC 0-1 FALLS W/OUT INJ PAST YR: ICD-10-PCS | Mod: CPTII,S$GLB,, | Performed by: NURSE PRACTITIONER

## 2020-07-17 PROCEDURE — 1159F PR MEDICATION LIST DOCUMENTED IN MEDICAL RECORD: ICD-10-PCS | Mod: S$GLB,,, | Performed by: NURSE PRACTITIONER

## 2020-07-17 PROCEDURE — 99999 PR PBB SHADOW E&M-EST. PATIENT-LVL V: ICD-10-PCS | Mod: PBBFAC,,, | Performed by: NURSE PRACTITIONER

## 2020-07-17 PROCEDURE — 1159F MED LIST DOCD IN RCRD: CPT | Mod: S$GLB,,, | Performed by: NURSE PRACTITIONER

## 2020-07-17 PROCEDURE — 1126F PR PAIN SEVERITY QUANTIFIED, NO PAIN PRESENT: ICD-10-PCS | Mod: S$GLB,,, | Performed by: NURSE PRACTITIONER

## 2020-07-17 PROCEDURE — 1101F PT FALLS ASSESS-DOCD LE1/YR: CPT | Mod: CPTII,S$GLB,, | Performed by: NURSE PRACTITIONER

## 2020-07-17 PROCEDURE — 99999 PR PBB SHADOW E&M-EST. PATIENT-LVL V: CPT | Mod: PBBFAC,,, | Performed by: NURSE PRACTITIONER

## 2020-07-17 RX ORDER — METFORMIN HYDROCHLORIDE 1000 MG/1
1000 TABLET ORAL 2 TIMES DAILY WITH MEALS
Qty: 180 TABLET | Refills: 1 | Status: SHIPPED | OUTPATIENT
Start: 2020-07-17 | End: 2020-10-19

## 2020-07-17 NOTE — PATIENT INSTRUCTIONS
Increase Metformin to 1000 mg 2 times per day     Continue Lantus 10 units daily     Freestyle marek

## 2020-07-17 NOTE — PROGRESS NOTES
CC:   Chief Complaint   Patient presents with    Diabetes Mellitus     BG are running 110-244       HPI: Melissa Sood is a 74 y.o. female presents for a follow-up visit today for the management of T2DM.     She was diagnosed with Type 2 diabetes about 10 years ago on routine lab work. She was started on metformin at the time of diagnosis. She has only ever been on Metformin for diabetes management. Never on insulin therapy.     Family hx of diabetes: a couple of siblings - she is 1 out 13.   Hospitalized for diabetes: denies     No personal or FH of thyroid cancer or personal of pancreatic cancer or pancreatitis.     Her diabetes has been well-controlled on low-dose metformin daily-until the past couple of months when her cancer regimen was changed. She is being treated for recurrent breast cancer with metastasis with Piqray-- she could not tolerate the 300 mg dose (hyperglycemia, constipation, weight loss) and was lowered to the 150 mg daily dose a couple of weeks ago.   No radiation treatment.   She presents today with her daughter-in-law Suzanne- who is helping to manage her diabetes.  She also has multiple sisters who are helping to administer her medications.  There is often a disconnect with regards to medication she is receiving versus what she is supposed to be receiving.    Patient has a history of dementia--she is pleasantly confused during our visit today--her family is taking care of her.  Her daughter-in-law reports that it is difficult as there are different caregivers throughout the day.  They may work on hiring someone to care for her throughout the day.  It will be difficult to use NovoLog or Humalog sliding scale as patient grazes throughout the day and does not have a consistent caregiver present to check her blood sugars before meals and administer sliding scale insulin.      She presents today for a 4 week follow up. At our last visit, we maximized her Metformin and started her on low dose  basal insulin- Lantus 20 units daily.  Her sister is the 1 who gives her her daily medications and has only been giving her metformin 500 mg b.i.d. instead of a 1000 b.i.d.. Suzanne has been giving her her Lantus.  No issues with injections.  No GI upset with metformin.  Patient has altered her diet significantly since our last visit.  Has almost completely eliminated sugary beverages and has cut back on carbohydrate intake.        DIABETES COMPLICATIONS: none      Diabetes Management Status    ASA:  Yes - 81 mg daily     Statin: Taking  ACE/ARB: Not taking    Screening or Prevention Patient's value Goal Complete/Controlled?   HgA1C Testing and Control   Lab Results   Component Value Date    HGBA1C 5.9 03/26/2020      Annually/Less than 8% Yes   Lipid profile : 04/16/2019 Annually No   LDL control Lab Results   Component Value Date    LDLCALC 44 04/16/2019    Annually/Less than 100 mg/dl  No   Nephropathy screening Lab Results   Component Value Date    LABMICR 8.0 04/16/2019     Lab Results   Component Value Date    PROTEINUA Trace (A) 03/16/2020    Annually Yes   Blood pressure BP Readings from Last 1 Encounters:   07/17/20 (!) 117/56    Less than 140/90 Yes   Dilated retinal exam Most Recent Eye Exam Date: Not Found-- going on Monday - Dr. Ley  Annually Yes   Foot exam   : 06/24/2020 Annually No       CURRENT A1C:    Hemoglobin A1C   Date Value Ref Range Status   03/26/2020 5.9 4.5 - 6.2 % Final     Comment:     According to ADA guidelines, hemoglobin A1C <7.0% represents  optimal control in non-pregnant diabetic patients.  Different  metrics may apply to specific populations.   Standards of Medical Care in Diabetes - 2016.  For the purpose of screening for the presence of diabetes:  <5.7%     Consistent with the absence of diabetes  5.7-6.4%  Consistent with increasing risk for diabetes   (prediabetes)  >or=6.5%  Consistent with diabetes  Currently no consensus exists for use of hemoglobin A1C  for diagnosis of  diabetes for children.     04/16/2019 5.9 (H) 4.0 - 5.6 % Final     Comment:     ADA Screening Guidelines:  5.7-6.4%  Consistent with prediabetes  >or=6.5%  Consistent with diabetes  High levels of fetal hemoglobin interfere with the HbA1C  assay. Heterozygous hemoglobin variants (HbS, HgC, etc)do  not significantly interfere with this assay.   However, presence of multiple variants may affect accuracy.     01/30/2018 5.9 (H) 4.0 - 5.6 % Final     Comment:     According to ADA guidelines, hemoglobin A1c <7.0% represents  optimal control in non-pregnant diabetic patients. Different  metrics may apply to specific patient populations.   Standards of Medical Care in Diabetes-2016.  For the purpose of screening for the presence of diabetes:  <5.7%     Consistent with the absence of diabetes  5.7-6.4%  Consistent with increasing risk for diabetes   (prediabetes)  >or=6.5%  Consistent with diabetes  Currently, no consensus exists for use of hemoglobin A1c  for diagnosis of diabetes for children.  This Hemoglobin A1c assay has significant interference with fetal   hemoglobin   (HbF). The results are invalid for patients with abnormal amounts of   HbF,   including those with known Hereditary Persistence   of Fetal Hemoglobin. Heterozygous hemoglobin variants (HbAS, HbAC,   HbAD, HbAE, HbA2) do not significantly interfere with this assay;   however, presence of multiple variants in a sample may impact the %   interference.         GOAL A1C: 7% without hypoglycemia      DM MEDICATIONS USED IN THE PAST: Metformin   Lantus     CURRENT DIABETES MEDICATIONS: Metformin 500 mg BID and Lantus 10 units daily    Insulin: N/A   Missed doses: denies       BLOOD GLUCOSE MONITORING:  Checking 2 times per day   She presents with logs from home for review.   Some readings may be post prandial as she grazes throughout the day               HYPOGLYCEMIA:  No      MEALS: Grazer   Working on diet.   Most of her eating is in the evening   Grazing  throughout the day --- sun chips and grapes   BF: toast and jelly or sometimes skips  Drinks: Crystal light tea   Eliminating regular soft for the most part.   whole milk-  coffee in the AM   Water   Juice sometimes -- OJ        CURRENT EXERCISE:  No    Review of Systems  Review of Systems   Constitutional: Negative for appetite change, fatigue and unexpected weight change.   HENT: Negative for trouble swallowing.    Eyes: Negative for visual disturbance.   Respiratory: Negative for shortness of breath.    Cardiovascular: Negative for chest pain.   Gastrointestinal: Negative for nausea.   Endocrine: Negative for polydipsia, polyphagia and polyuria.   Genitourinary:        No Nocturia    Musculoskeletal: Positive for neck pain (improving ).   Skin: Negative for wound.   Neurological: Negative for numbness.        Forgetful    Psychiatric/Behavioral: Positive for confusion.       Physical Exam   Physical Exam  Vitals signs and nursing note reviewed.   Constitutional:       Appearance: She is well-developed.   HENT:      Head: Normocephalic and atraumatic.      Right Ear: External ear normal.      Left Ear: External ear normal.      Nose: Nose normal.   Neck:      Musculoskeletal: Normal range of motion and neck supple.      Thyroid: No thyromegaly.      Trachea: No tracheal deviation.   Cardiovascular:      Rate and Rhythm: Normal rate and regular rhythm.      Heart sounds: No murmur.   Pulmonary:      Effort: Pulmonary effort is normal. No respiratory distress.      Breath sounds: Normal breath sounds.   Abdominal:      Palpations: Abdomen is soft.      Tenderness: There is no abdominal tenderness.      Hernia: No hernia is present.   Skin:     General: Skin is warm and dry.      Capillary Refill: Capillary refill takes less than 2 seconds.      Findings: No rash.   Neurological:      Mental Status: She is alert.      Cranial Nerves: No cranial nerve deficit.      Comments: Pleasantly confused--history of dementia    Psychiatric:         Behavior: Behavior normal.         Judgment: Judgment normal.         FOOT EXAMINATION:  Appropriate footwear     Lab Results   Component Value Date    TSH 1.354 01/30/2018         Type 2 diabetes mellitus with hyperglycemia, without long-term current use of insulin  Blood sugar readings have greatly improved.  Will get professional CGM to evaluate BG readings further --due to patient's history of Alzheimer's it is difficult to know with these readings are postprandial, however most of her readings are at or near goal   It will be very difficult for them to get a pre meal blood sugar reading--concerned that the NovoLog/Humalog sliding scale would be use then to treat a postprandial blood sugar reading which would increase the risk for hypoglycemia.  Encouraged continued focus on diet to eliminate sugary beverages and cut back on carbohydrate intake.      -- Medication Changes:  Increase metformin to a 1000 mg 2 times per day--1000 mg tablets sent to pharmacy  Continue Lantus 10 units daily      -- Reviewed goals of therapy are to get the best control we can without hypoglycemia.  -- Reviewed patient's current insulin regimen. Clarified proper insulin dose and timing in relation to meals, etc. Insulin injection sites and proper rotation instructed.    -- Advised frequent self blood glucose monitoring.  Patient encouraged to document glucose results and bring them to every clinic visit. -ideally 2-3 times per day before meals--logs provided--send logs p.r.n. via the portal  Discussed the freestyle marek with patient and daughter in law. They will think about it   -- Hypoglycemia precautions discussed. Instructed on precautions before driving.    -- Call for Bg repeatedly < 90 or > 180.   -- Close adherence to lifestyle changes recommended.   -- Periodic follow ups for eye evaluations, foot care and dental care suggested.    Message sent to Blayne to request eye records as patient is going to the  eye doctor on Monday      Hyperlipidemia  On statin per ADA recommendations  LDL goal < 100. LDL at goal. LFTs WNL. Continue statin.   Check lipids with RTC     Alzheimer's disease  May hinder overall diabetes management  Patient lives alone but has family members that care for her    Recurrent malignant neoplasm of right breast  Continue follow-up with oncology  Her Piqray dose was lowered from 300 mg to 150 mg-- has decreased hyperglycemia         Follow up in about 3 months (around 10/17/2020).   Urine MAC today   Professional CGM   F/u with me in 3 months labs prior       Orders Placed This Encounter   Procedures    Microalbumin/creatinine urine ratio     Standing Status:   Future     Standing Expiration Date:   1/17/2022     Order Specific Question:   Specimen Source     Answer:   Urine    Hemoglobin A1C     Standing Status:   Future     Standing Expiration Date:   1/17/2022    Basic metabolic panel     Standing Status:   Future     Standing Expiration Date:   1/17/2022    Lipid Panel     Standing Status:   Future     Standing Expiration Date:   1/17/2022    GLUCOSE MONITORING CONTINUOUS MIN 72 HOURS     Standing Status:   Future     Standing Expiration Date:   7/17/2021       Recommendations were discussed with the patient in detail  The patient verbalized understanding and agrees with the plan outlined as above.

## 2020-07-17 NOTE — ASSESSMENT & PLAN NOTE
May hinder overall diabetes management  Patient lives alone but has family members that care for her

## 2020-07-17 NOTE — ASSESSMENT & PLAN NOTE
Continue follow-up with oncology  Her Piqray dose was lowered from 300 mg to 150 mg-- has decreased hyperglycemia

## 2020-07-17 NOTE — ASSESSMENT & PLAN NOTE
Blood sugar readings have greatly improved.  Will get professional CGM to evaluate BG readings further --due to patient's history of Alzheimer's it is difficult to know with these readings are postprandial, however most of her readings are at or near goal   It will be very difficult for them to get a pre meal blood sugar reading--concerned that the NovoLog/Humalog sliding scale would be use then to treat a postprandial blood sugar reading which would increase the risk for hypoglycemia.  Encouraged continued focus on diet to eliminate sugary beverages and cut back on carbohydrate intake.      -- Medication Changes:  Increase metformin to a 1000 mg 2 times per day--1000 mg tablets sent to pharmacy  Continue Lantus 10 units daily      -- Reviewed goals of therapy are to get the best control we can without hypoglycemia.  -- Reviewed patient's current insulin regimen. Clarified proper insulin dose and timing in relation to meals, etc. Insulin injection sites and proper rotation instructed.    -- Advised frequent self blood glucose monitoring.  Patient encouraged to document glucose results and bring them to every clinic visit. -ideally 2-3 times per day before meals--logs provided--send logs p.r.n. via the portal  Discussed the freestyle marek with patient and daughter in law. They will think about it   -- Hypoglycemia precautions discussed. Instructed on precautions before driving.    -- Call for Bg repeatedly < 90 or > 180.   -- Close adherence to lifestyle changes recommended.   -- Periodic follow ups for eye evaluations, foot care and dental care suggested.    Message sent to Blayne to request eye records as patient is going to the eye doctor on Monday

## 2020-07-17 NOTE — ASSESSMENT & PLAN NOTE
On statin per ADA recommendations  LDL goal < 100. LDL at goal. LFTs WNL. Continue statin.   Check lipids with RTC

## 2020-07-20 ENCOUNTER — TELEPHONE (OUTPATIENT)
Dept: PRIMARY CARE CLINIC | Facility: CLINIC | Age: 75
End: 2020-07-20

## 2020-07-20 NOTE — TELEPHONE ENCOUNTER
Spoke with the patient about her recent lab results. Read notation as written by the provider to the patient. She verbalized understanding of the information provided to her. No further issues discussed.

## 2020-07-20 NOTE — TELEPHONE ENCOUNTER
Call patient - let her know that her urine lab test came back normal. There was not a clinically concerning amount of protein in her urine, which is a good thing.     No changes in current plan of care.   Continue same diabetes management plan as discussed during office visit.     Thanks,  Ana Paula

## 2020-07-23 ENCOUNTER — OFFICE VISIT (OUTPATIENT)
Dept: PRIMARY CARE CLINIC | Facility: CLINIC | Age: 75
End: 2020-07-23
Payer: MEDICARE

## 2020-07-23 ENCOUNTER — PATIENT OUTREACH (OUTPATIENT)
Dept: ADMINISTRATIVE | Facility: HOSPITAL | Age: 75
End: 2020-07-23

## 2020-07-23 DIAGNOSIS — Z11.59 NEED FOR HEPATITIS C SCREENING TEST: Primary | ICD-10-CM

## 2020-07-23 DIAGNOSIS — R68.89 FLU-LIKE SYMPTOMS: Primary | ICD-10-CM

## 2020-07-23 PROCEDURE — 99442 PR PHYSICIAN TELEPHONE EVALUATION 11-20 MIN: CPT | Mod: 95,,, | Performed by: FAMILY MEDICINE

## 2020-07-23 PROCEDURE — 99442 PR PHYSICIAN TELEPHONE EVALUATION 11-20 MIN: ICD-10-PCS | Mod: 95,,, | Performed by: FAMILY MEDICINE

## 2020-07-23 NOTE — PROGRESS NOTES
Amber Eagle, TOM Huggins, LPN   Caller: Unspecified (6 days ago, 11:50 AM)             Hey,     She is going to go to Dr. Ley on Monday for her eye exam     Thanks   Amber

## 2020-07-23 NOTE — LETTER
AUTHORIZATION FOR RELEASE OF   CONFIDENTIAL INFORMATION    Dear  Dr. Jorge Ley and Staff,    We are seeing Melissa Sood, date of birth 1945, in the clinic at [facility]. Cameron Lopez MD is the patient's PCP. Melissa Sood has an outstanding lab/procedure at the time we reviewed her chart. In order to help keep her health information updated, she has authorized us to request the following medical record(s):        (  )  MAMMOGRAM                                      (  )  COLONOSCOPY      (  )  PAP SMEAR                                          (  )  OUTSIDE LAB RESULTS     (  )  DEXA SCAN                                          ( X )  EYE EXAM      2020      (  )  FOOT EXAM                                          (  )  ENTIRE RECORD     (  )  OUTSIDE IMMUNIZATIONS                 (  )  _______________         Please fax records to [facility], Ryan M Truxillo, MD, [phone]     If you have any questions, please contact Blayne Huggins LPN at [phone].           Patient Name: Melissa Sood  : 1945  Patient Phone #: [phone]

## 2020-07-23 NOTE — PROGRESS NOTES
Established Patient - Audio Only Telehealth Visit     The patient location is: home  The chief complaint leading to consultation is: sinus issues  Visit type: Virtual visit with audio only (telephone)  Total time spent with patient: 15 min       The reason for the audio only service rather than synchronous audio and video virtual visit was related to technical difficulties or patient preference/necessity.     Each patient to whom I provide medical services by telemedicine is:  (1) informed of the relationship between the physician and patient and the respective role of any other health care provider with respect to management of the patient; and (2) notified that they may decline to receive medical services by telemedicine and may withdraw from such care at any time. Patient verbally consented to receive this service via voice-only telephone call.       Subjective:       Patient ID: Melissa Sood is a 74 y.o. female.    Chief Complaint: No chief complaint on file.    Speaking to daughter, Britney, on the phone today due to patient dementia.   Coughing, congestion, and diarrhea for the past few days. Recently started new chemotherapy agent piqray for her breast cancer which daughter wonders may be causing GI symptoms. The family is also worried about Covid as a family member has been around known to not respect social distancing safety precautions etc. Pt does not appear uncomfortable or struggling to breath. No fevers.     Review of Systems   Unable to perform ROS: Dementia       Objective:      There were no vitals filed for this visit.  Physical Exam        Lab Results   Component Value Date     06/17/2020     06/24/2019    K 3.9 06/17/2020     06/17/2020     06/24/2019    CO2 25 06/17/2020    BUN 14 06/17/2020    CREATININE 0.8 06/17/2020    CREATININE 0.82 06/24/2019    ANIONGAP 11 06/17/2020     Lab Results   Component Value Date    HGBA1C 5.9 03/26/2020     Lab Results   Component Value  Date     (H) 06/04/2020       Lab Results   Component Value Date    WBC 4.80 06/17/2020    WBC 2.5 (L) 06/24/2019    HGB 12.8 06/17/2020    HCT 38.6 06/17/2020     06/17/2020    GRAN 2.7 06/17/2020    GRAN 56.4 06/17/2020     Lab Results   Component Value Date    CHOL 107 04/16/2019    HDL 52 04/16/2019    LDLCALC 44 04/16/2019    TRIG 53 04/16/2019          Current Outpatient Medications:     alpelisib (PIQRAY) 300 mg/day (150 mg x 2) Tab, Take 300 mg by mouth once daily., Disp: 60 tablet, Rfl: 6    ALPRAZolam (XANAX) 0.25 MG tablet, TAKE 1 TABLET BY MOUTH THREE TIMES DAILY AS NEEDED FOR ANXIETY, Disp: 30 tablet, Rfl: 0    aspirin (ECOTRIN) 81 MG EC tablet, Take 81 mg by mouth once daily., Disp: , Rfl:     atorvastatin (LIPITOR) 20 MG tablet, TAKE 1 TABLET BY MOUTH ONCE DAILY, Disp: 90 tablet, Rfl: 3    blood sugar diagnostic Strp, 1 strip by Misc.(Non-Drug; Combo Route) route 4 (four) times daily with meals and nightly., Disp: 150 each, Rfl: 11    blood-glucose meter (FREESTYLE SYSTEM KIT) kit, Use as instructed, Disp: 1 each, Rfl: 0    cyanocobalamin 1,000 mcg/mL injection, ADM 1 ML IM Q WK, Disp: , Rfl: 6    docusate sodium (COLACE) 100 MG capsule, Take 1 capsule (100 mg total) by mouth 2 (two) times daily., Disp: 60 capsule, Rfl: 0    donepezil (ARICEPT) 10 MG tablet, Take 1 tablet by mouth every evening., Disp: , Rfl:     escitalopram oxalate (LEXAPRO) 20 MG tablet, TAKE 1 TABLET BY MOUTH EVERY DAY, Disp: 90 tablet, Rfl: 0    escitalopram oxalate (LEXAPRO) 20 MG tablet, TAKE 1 TABLET BY MOUTH EVERY DAY, Disp: 90 tablet, Rfl: 0    exemestane (AROMASIN) 25 mg tablet, TAKE 1 TABLET BY MOUTH EVERY DAY, Disp: 30 tablet, Rfl: 11    HYDROcodone-acetaminophen (NORCO) 5-325 mg per tablet, Take 1 tablet by mouth every 6 (six) hours as needed for Pain., Disp: 60 tablet, Rfl: 0    insulin (LANTUS SOLOSTAR U-100 INSULIN) glargine 100 units/mL (3mL) SubQ pen, Inject 10 Units into the skin every  "evening. Titrate up to FBG <150. Max TDD of 50 units, Disp: 1 Box, Rfl: 3    lancets (FREESTYLE LANCETS) 28 gauge Misc, 1 lancet by Misc.(Non-Drug; Combo Route) route 4 (four) times daily with meals and nightly., Disp: 150 each, Rfl: 11    memantine (NAMENDA XR) 28 mg CSpX, memantine 28 mg capsule sprinkle,extended release 24hr  TAKE 1 CAPSULE BY MOUTH EVERY DAY, Disp: , Rfl:     metFORMIN (GLUCOPHAGE) 1000 MG tablet, Take 1 tablet (1,000 mg total) by mouth 2 (two) times daily with meals., Disp: 180 tablet, Rfl: 1    ondansetron (ZOFRAN) 8 MG tablet, Take 1 tablet (8 mg total) by mouth every 8 (eight) hours as needed for Nausea., Disp: 30 tablet, Rfl: 5    pen needle, diabetic (BD ULTRA-FINE SHARA PEN NEEDLE) 32 gauge x 5/32" Ndle, To use daily with insulin injection, Disp: 100 each, Rfl: 3    polyethylene glycol (GLYCOLAX) 17 gram/dose powder, Take 17 g by mouth once daily., Disp: 238 g, Rfl: 0    promethazine (PHENERGAN) 25 MG tablet, Take 1 tablet (25 mg total) by mouth every 4 to 6 hours as needed., Disp: 30 tablet, Rfl: 5    sertraline (ZOLOFT) 50 MG tablet, sertraline 50 mg tablet  TK 1 T PO QD FOR 2 WKS THEN TK 1 T PO BID, Disp: , Rfl:         Assessment:       1. Flu-like symptoms           Plan:       Flu-like symptoms  -     COVID-19 Home Symptom Monitoring  - Duration (days): 14  -     COVID-19 Routine Screening     flu like symptoms with concern for Covid with multiple comorbidities. Referral for testing and symptom tracker. ED precautions given.                                 This service was not originating from a related E/M service provided within the previous 7 days nor will  to an E/M service or procedure within the next 24 hours or my soonest available appointment.  Prevailing standard of care was able to be met in this audio-only visit.        "

## 2020-07-24 ENCOUNTER — TELEPHONE (OUTPATIENT)
Dept: PRIMARY CARE CLINIC | Facility: CLINIC | Age: 75
End: 2020-07-24

## 2020-07-24 DIAGNOSIS — C50.911 RECURRENT MALIGNANT NEOPLASM OF RIGHT BREAST: Primary | Chronic | ICD-10-CM

## 2020-07-24 NOTE — TELEPHONE ENCOUNTER
Reviewed positive Covid and immunocompromised state. She remains virtually asymptomatic thankfully. Oncology aware. Family is receiving symptoms tracker and knows to quarantine with new guidelines being 20 days for patients in such vulnerable health states.

## 2020-07-24 NOTE — PROGRESS NOTES
Called and discussed patient's positive covid result.  She has no significant symptoms per her daughter and she is feeling fine.  I will continue her Piqray medication but will check her labs to be sure she is not neutropenic.  She will have this done today.

## 2020-07-31 ENCOUNTER — TELEPHONE (OUTPATIENT)
Dept: PRIMARY CARE CLINIC | Facility: CLINIC | Age: 75
End: 2020-07-31

## 2020-07-31 DIAGNOSIS — J98.8 RESPIRATORY TRACT INFECTION DUE TO COVID-19 VIRUS: Primary | ICD-10-CM

## 2020-07-31 DIAGNOSIS — U07.1 RESPIRATORY TRACT INFECTION DUE TO COVID-19 VIRUS: Primary | ICD-10-CM

## 2020-07-31 NOTE — TELEPHONE ENCOUNTER
----- Message from Vicky Edge sent at 7/31/2020  9:32 AM CDT -----  Contact: Daughter  Type: Needs Medical Advice    Who Called:  Britney daughter  Symptoms (please be specific):  Cough  How long has patient had these symptoms:  4 days  Pharmacy name and phone #:  N/A  Best Call Back Number: [phone]  Additional Information: Calling to ask if her mother should have a chest xray and what medication should she give her. Please call her. Thanks.

## 2020-07-31 NOTE — TELEPHONE ENCOUNTER
"Patients daughter is asking if she should get a chest x-ray? She has a nagging cough but is not coughing up any phlegm, no wheezing, no fever. She was just concerned because she had a "spot" on her lung in the past. Patient was tested 8 days ago and positive for COVID.   "

## 2020-08-06 DIAGNOSIS — U07.1 RESPIRATORY TRACT INFECTION DUE TO COVID-19 VIRUS: Primary | ICD-10-CM

## 2020-08-06 DIAGNOSIS — J98.8 RESPIRATORY TRACT INFECTION DUE TO COVID-19 VIRUS: Primary | ICD-10-CM

## 2020-08-10 ENCOUNTER — HOSPITAL ENCOUNTER (OUTPATIENT)
Dept: RADIOLOGY | Facility: HOSPITAL | Age: 75
Discharge: HOME | End: 2020-08-10
Attending: INTERNAL MEDICINE
Payer: MEDICARE

## 2020-08-10 ENCOUNTER — NUTRITION (OUTPATIENT)
Dept: DIABETES | Facility: CLINIC | Age: 75
End: 2020-08-10
Payer: MEDICARE

## 2020-08-10 VITALS — WEIGHT: 127 LBS | HEIGHT: 62 IN | BODY MASS INDEX: 23.37 KG/M2

## 2020-08-10 VITALS — BODY MASS INDEX: 23.37 KG/M2 | WEIGHT: 127 LBS | HEIGHT: 62 IN

## 2020-08-10 DIAGNOSIS — E11.69 TYPE 2 DIABETES MELLITUS WITH HYPERLIPIDEMIA: ICD-10-CM

## 2020-08-10 DIAGNOSIS — E11.65 TYPE 2 DIABETES MELLITUS WITH HYPERGLYCEMIA, WITHOUT LONG-TERM CURRENT USE OF INSULIN: Primary | ICD-10-CM

## 2020-08-10 DIAGNOSIS — Z17.0 MALIGNANT NEOPLASM OF UPPER-OUTER QUADRANT OF RIGHT BREAST IN FEMALE, ESTROGEN RECEPTOR POSITIVE: ICD-10-CM

## 2020-08-10 DIAGNOSIS — C50.411 MALIGNANT NEOPLASM OF UPPER-OUTER QUADRANT OF RIGHT BREAST IN FEMALE, ESTROGEN RECEPTOR POSITIVE: ICD-10-CM

## 2020-08-10 DIAGNOSIS — E78.5 TYPE 2 DIABETES MELLITUS WITH HYPERLIPIDEMIA: ICD-10-CM

## 2020-08-10 DIAGNOSIS — C50.911 RECURRENT MALIGNANT NEOPLASM OF RIGHT BREAST: ICD-10-CM

## 2020-08-10 LAB — GLUCOSE SERPL-MCNC: 119 MG/DL (ref 70–110)

## 2020-08-10 PROCEDURE — 78815 PET IMAGE W/CT SKULL-THIGH: CPT | Mod: TC,PO,PS

## 2020-08-10 PROCEDURE — 99999 PR PBB SHADOW E&M-EST. PATIENT-LVL III: ICD-10-PCS | Mod: PBBFAC,,, | Performed by: DIETITIAN, REGISTERED

## 2020-08-10 PROCEDURE — 95250 CONT GLUC MNTR PHYS/QHP EQP: CPT | Mod: PBBFAC,PN | Performed by: DIETITIAN, REGISTERED

## 2020-08-10 PROCEDURE — 99999 PR PBB SHADOW E&M-EST. PATIENT-LVL III: CPT | Mod: PBBFAC,,, | Performed by: DIETITIAN, REGISTERED

## 2020-08-10 PROCEDURE — A9552 F18 FDG: HCPCS | Mod: PO

## 2020-08-10 NOTE — PROGRESS NOTES
CONTINUOUS GLUCOSE MONITOR 72 HOUR START    Patient is here in clinic today for initial start of professional continuous glucose monitoring system (CGMA). Reviewed with patient when to return to download data. Provider inserted sensor on left arm. Sensor was setup. Discussed what the sensor will record and who will interpret the data. Patient aware that she still needs to monitor blood glucose qac and hs. Also discussed the daily log that patient is required to complete while CGM is being used. Patient provided with daily log. Questions addressed. Initialization finished. No futher questions. Patient states understanding.    Lot number: [id number]  Serial number: [id number]  Expiration Date: 10/31/2020    Time spent counseling patient: 30 minutes MONITOR START  Visit Time Spent:  30 minutes

## 2020-08-13 ENCOUNTER — OFFICE VISIT (OUTPATIENT)
Dept: HEMATOLOGY/ONCOLOGY | Facility: CLINIC | Age: 75
End: 2020-08-13
Payer: MEDICARE

## 2020-08-13 VITALS
TEMPERATURE: 98 F | SYSTOLIC BLOOD PRESSURE: 128 MMHG | DIASTOLIC BLOOD PRESSURE: 68 MMHG | BODY MASS INDEX: 22.68 KG/M2 | HEART RATE: 75 BPM | WEIGHT: 124 LBS | RESPIRATION RATE: 19 BRPM

## 2020-08-13 DIAGNOSIS — R73.9 HYPERGLYCEMIA: ICD-10-CM

## 2020-08-13 DIAGNOSIS — C50.911 RECURRENT MALIGNANT NEOPLASM OF RIGHT BREAST: Chronic | ICD-10-CM

## 2020-08-13 PROCEDURE — 1126F AMNT PAIN NOTED NONE PRSNT: CPT | Mod: S$GLB,,, | Performed by: INTERNAL MEDICINE

## 2020-08-13 PROCEDURE — 1101F PT FALLS ASSESS-DOCD LE1/YR: CPT | Mod: S$GLB,,, | Performed by: INTERNAL MEDICINE

## 2020-08-13 PROCEDURE — 1159F PR MEDICATION LIST DOCUMENTED IN MEDICAL RECORD: ICD-10-PCS | Mod: S$GLB,,, | Performed by: INTERNAL MEDICINE

## 2020-08-13 PROCEDURE — 1101F PR PT FALLS ASSESS DOC 0-1 FALLS W/OUT INJ PAST YR: ICD-10-PCS | Mod: S$GLB,,, | Performed by: INTERNAL MEDICINE

## 2020-08-13 PROCEDURE — 99214 PR OFFICE/OUTPT VISIT, EST, LEVL IV, 30-39 MIN: ICD-10-PCS | Mod: S$GLB,,, | Performed by: INTERNAL MEDICINE

## 2020-08-13 PROCEDURE — 1159F MED LIST DOCD IN RCRD: CPT | Mod: S$GLB,,, | Performed by: INTERNAL MEDICINE

## 2020-08-13 PROCEDURE — 99214 OFFICE O/P EST MOD 30 MIN: CPT | Mod: S$GLB,,, | Performed by: INTERNAL MEDICINE

## 2020-08-13 PROCEDURE — 1126F PR PAIN SEVERITY QUANTIFIED, NO PAIN PRESENT: ICD-10-PCS | Mod: S$GLB,,, | Performed by: INTERNAL MEDICINE

## 2020-08-13 RX ORDER — ATORVASTATIN CALCIUM 20 MG/1
TABLET, FILM COATED ORAL
Qty: 90 TABLET | Refills: 1 | Status: SHIPPED | OUTPATIENT
Start: 2020-08-13 | End: 2021-02-19

## 2020-08-13 NOTE — PROGRESS NOTES
PROGRESS NOTE    Subjective:       Patient ID: Melissa Sood is a 75 y.o. female.    Breast Cancer:  Dx: 2003  T2N1M0 stage II  neoadj AC-T, surgery-XRT Arimidex-Femara till 2/2011  BRCA 1/2 neg  ER pos, Her 2 neg  Recurrence 3/2018  Started Ibrance/Exemestane 4/30/2018-5/1/2020.  Piqray 5/15/2020- 6/7/2020(held due to high glu)    Chief Complaint:  No chief complaint on file.  breast cancer follow up.      History of Present Illness:   Melissa Sood is a 75 y.o. female who presents for follow up    Patient now on 150mg of piqray and is tolerating this better.     Patient is feeling well with no new complaints.  Blood sugars have been under better control.     PIK3CA--path 4/2/2018    PET impression 8/10/2020:  1. Interval improvement with decreased FDG hypermetabolism associated with right supraclavicular lymph node, and resolution of previous hypermetabolic superior mediastinal lymph node.  2. No new findings of active metastatic disease.  3. Stable diffuse sclerotic osseous metastases, non hypermetabolic and consistent with treated or quiescent disease.  4. Resolution of previous left lower lobe nodular opacities, with no new suspicious pulmonary nodules.    Labs 6/24/2019:  WBC 2.5(ANC 1.3)  Hb 11.6  Plt: 110--228  CMP unremark  CA 15-3:  42.9--44--55  CA 27.29: 62.5--68--69      PET scan impression 8/10/2020:  1. Interval improvement with decreased FDG hypermetabolism associated with right supraclavicular lymph node, and resolution of previous hypermetabolic superior mediastinal lymph node.  2. No new findings of active metastatic disease.  3. Stable diffuse sclerotic osseous metastases, non hypermetabolic and consistent with treated or quiescent disease.  4. Resolution of previous left lower lobe nodular opacities, with no new suspicious pulmonary nodules    Family and Social history reviewed and is unchanged from 9/22/2014      ROS:  Review of Systems    Constitutional: Negative for appetite change, fever and unexpected weight change.   Eyes: Negative for visual disturbance.   Respiratory: Negative for cough and shortness of breath.    Cardiovascular: Negative for chest pain and leg swelling.   Gastrointestinal: Positive for diarrhea. Negative for abdominal pain and blood in stool.   Genitourinary: Negative for frequency and hematuria.   Musculoskeletal: Negative for back pain.   Skin: Negative for rash.   Neurological: Negative for headaches.   Hematological: Negative for adenopathy.   Psychiatric/Behavioral: The patient is not nervous/anxious.           Current Outpatient Medications:     alpelisib (PIQRAY) 300 mg/day (150 mg x 2) Tab, Take 300 mg by mouth once daily., Disp: 60 tablet, Rfl: 6    ALPRAZolam (XANAX) 0.25 MG tablet, TAKE 1 TABLET BY MOUTH THREE TIMES DAILY AS NEEDED FOR ANXIETY, Disp: 30 tablet, Rfl: 0    aspirin (ECOTRIN) 81 MG EC tablet, Take 81 mg by mouth once daily., Disp: , Rfl:     atorvastatin (LIPITOR) 20 MG tablet, TAKE 1 TABLET BY MOUTH ONCE DAILY, Disp: 90 tablet, Rfl: 1    blood sugar diagnostic Strp, 1 strip by Misc.(Non-Drug; Combo Route) route 4 (four) times daily with meals and nightly., Disp: 150 each, Rfl: 11    blood-glucose meter (FREESTYLE SYSTEM KIT) kit, Use as instructed, Disp: 1 each, Rfl: 0    cyanocobalamin 1,000 mcg/mL injection, ADM 1 ML IM Q WK, Disp: , Rfl: 6    docusate sodium (COLACE) 100 MG capsule, Take 1 capsule (100 mg total) by mouth 2 (two) times daily., Disp: 60 capsule, Rfl: 0    donepezil (ARICEPT) 10 MG tablet, Take 1 tablet by mouth every evening., Disp: , Rfl:     escitalopram oxalate (LEXAPRO) 20 MG tablet, TAKE 1 TABLET BY MOUTH EVERY DAY, Disp: 90 tablet, Rfl: 0    escitalopram oxalate (LEXAPRO) 20 MG tablet, TAKE 1 TABLET BY MOUTH EVERY DAY, Disp: 90 tablet, Rfl: 0    exemestane (AROMASIN) 25 mg tablet, TAKE 1 TABLET BY MOUTH EVERY DAY, Disp: 30 tablet, Rfl: 11     "HYDROcodone-acetaminophen (NORCO) 5-325 mg per tablet, Take 1 tablet by mouth every 6 (six) hours as needed for Pain., Disp: 60 tablet, Rfl: 0    insulin (LANTUS SOLOSTAR U-100 INSULIN) glargine 100 units/mL (3mL) SubQ pen, Inject 10 Units into the skin every evening. Titrate up to FBG <150. Max TDD of 50 units, Disp: 1 Box, Rfl: 3    lancets (FREESTYLE LANCETS) 28 gauge Misc, 1 lancet by Misc.(Non-Drug; Combo Route) route 4 (four) times daily with meals and nightly., Disp: 150 each, Rfl: 11    memantine (NAMENDA XR) 28 mg CSpX, memantine 28 mg capsule sprinkle,extended release 24hr  TAKE 1 CAPSULE BY MOUTH EVERY DAY, Disp: , Rfl:     metFORMIN (GLUCOPHAGE) 1000 MG tablet, Take 1 tablet (1,000 mg total) by mouth 2 (two) times daily with meals., Disp: 180 tablet, Rfl: 1    ondansetron (ZOFRAN) 8 MG tablet, Take 1 tablet (8 mg total) by mouth every 8 (eight) hours as needed for Nausea., Disp: 30 tablet, Rfl: 5    pen needle, diabetic (BD ULTRA-FINE SHARA PEN NEEDLE) 32 gauge x 5/32" Ndle, To use daily with insulin injection, Disp: 100 each, Rfl: 3    polyethylene glycol (GLYCOLAX) 17 gram/dose powder, Take 17 g by mouth once daily., Disp: 238 g, Rfl: 0    promethazine (PHENERGAN) 25 MG tablet, Take 1 tablet (25 mg total) by mouth every 4 to 6 hours as needed., Disp: 30 tablet, Rfl: 5    sertraline (ZOLOFT) 50 MG tablet, sertraline 50 mg tablet  TK 1 T PO QD FOR 2 WKS THEN TK 1 T PO BID, Disp: , Rfl:         Objective:       Physical Examination:     /68   Pulse 75   Temp 98.3 °F (36.8 °C)   Resp 19   Wt 56.2 kg (124 lb)   BMI 22.68 kg/m²     Physical Exam  Constitutional:       Appearance: She is well-developed.   HENT:      Head: Normocephalic and atraumatic.      Right Ear: External ear normal.      Left Ear: External ear normal.   Eyes:      Conjunctiva/sclera: Conjunctivae normal.      Pupils: Pupils are equal, round, and reactive to light.   Neck:      Thyroid: No thyromegaly.      Trachea: No " tracheal deviation.   Cardiovascular:      Rate and Rhythm: Normal rate and regular rhythm.      Heart sounds: Normal heart sounds.   Pulmonary:      Effort: Pulmonary effort is normal.      Breath sounds: Normal breath sounds.   Abdominal:      General: Bowel sounds are normal. There is no distension.      Palpations: Abdomen is soft. There is no mass.      Tenderness: There is no abdominal tenderness.   Skin:     Findings: No rash.   Neurological:      Comments: Neuro intact througout   Psychiatric:         Behavior: Behavior normal.         Thought Content: Thought content normal.         Judgment: Judgment normal.         Labs:   No results found for this or any previous visit (from the past 336 hour(s)).  CMP  Sodium   Date Value Ref Range Status   07/24/2020 138 136 - 145 mmol/L Final   06/24/2019 141 134 - 144 mmol/L      Potassium   Date Value Ref Range Status   07/24/2020 4.3 3.5 - 5.1 mmol/L Final     Chloride   Date Value Ref Range Status   07/24/2020 102 101 - 111 mmol/L Final   06/24/2019 103 98 - 110 mmol/L      CO2   Date Value Ref Range Status   07/24/2020 25 23 - 29 mmol/L Final     Glucose   Date Value Ref Range Status   07/24/2020 152 (H) 74 - 118 mg/dL Final   06/24/2019 87 70 - 99 mg/dL      BUN, Bld   Date Value Ref Range Status   07/24/2020 15 8 - 23 mg/dL Final     Creatinine   Date Value Ref Range Status   07/24/2020 0.8 0.5 - 1.4 mg/dL Final   06/24/2019 0.82 0.60 - 1.40 mg/dL      Calcium   Date Value Ref Range Status   07/24/2020 9.0 8.6 - 10.0 mg/dL Final     Total Protein   Date Value Ref Range Status   07/24/2020 7.4 6.0 - 8.4 g/dL Final     Albumin   Date Value Ref Range Status   07/24/2020 4.6 3.5 - 5.2 g/dL Final   06/24/2019 4.3 3.1 - 4.7 g/dL      Total Bilirubin   Date Value Ref Range Status   07/24/2020 0.8 0.3 - 1.2 mg/dL Final     Comment:     For infants and newborns, interpretation of results should be based  on gestational age, weight and in agreement with  clinical  observations.  Premature Infant recommended reference ranges:  Up to 24 hours.............<8.0 mg/dL  Up to 48 hours............<12.0 mg/dL  3-5 days..................<15.0 mg/dL  6-29 days.................<15.0 mg/dL       Alkaline Phosphatase   Date Value Ref Range Status   07/24/2020 66 38 - 126 U/L Final     AST   Date Value Ref Range Status   07/24/2020 23 15 - 41 U/L Final     ALT   Date Value Ref Range Status   07/24/2020 19 14 - 54 U/L Final     Anion Gap   Date Value Ref Range Status   07/24/2020 11 8 - 16 mmol/L Final     eGFR if    Date Value Ref Range Status   07/24/2020 >60.0 >60 mL/min/1.73 m^2 Final     eGFR if non    Date Value Ref Range Status   07/24/2020 >60.0 >60 mL/min/1.73 m^2 Final     Comment:     Calculation used to obtain the estimated glomerular filtration  rate (eGFR) is the CKD-EPI equation.        Lab Results   Component Value Date    CEA 2.6 06/24/2019     No results found for: PSA        Assessment/Plan:     Problem List Items Addressed This Visit     Recurrent malignant neoplasm of right breast (Chronic)     Patient is doing well and PET scan shows her disease to be responding to therapy.  Will continue current dose of Apelisib 150mg daily.      I will see her again in 2 months and will continue monthly labs.           Relevant Orders    CBC auto differential    Comprehensive metabolic panel    Hyperglycemia     Counts have been improving and will continue Piqray.  This is driving up the sugar but is working well for the cancer so will keep treating the DM.                 Discussion:   High complexity, high risk medications.   Follow up in about 2 months (around 10/13/2020).      Electronically signed by Rudolph Bryan

## 2020-08-13 NOTE — ASSESSMENT & PLAN NOTE
Patient is doing well and PET scan shows her disease to be responding to therapy.  Will continue current dose of Apelisib 150mg daily.      I will see her again in 2 months and will continue monthly labs.

## 2020-08-13 NOTE — ASSESSMENT & PLAN NOTE
Counts have been improving and will continue Piqray.  This is driving up the sugar but is working well for the cancer so will keep treating the DM.

## 2020-08-28 ENCOUNTER — TELEPHONE (OUTPATIENT)
Dept: DIABETES | Facility: CLINIC | Age: 75
End: 2020-08-28

## 2020-08-28 NOTE — TELEPHONE ENCOUNTER
Spoke with patient's care giver, the arm that the sensor is currently on is swelling. Instructed to remove sensor and return to us so we can download the data. Also instructed to call PCP regarding swelling if it persists. States understanding.   midline

## 2020-08-31 ENCOUNTER — NUTRITION (OUTPATIENT)
Dept: DIABETES | Facility: CLINIC | Age: 75
End: 2020-08-31
Payer: MEDICARE

## 2020-08-31 ENCOUNTER — TELEPHONE (OUTPATIENT)
Dept: DIABETES | Facility: CLINIC | Age: 75
End: 2020-08-31

## 2020-08-31 DIAGNOSIS — E11.69 TYPE 2 DIABETES MELLITUS WITH HYPERLIPIDEMIA: ICD-10-CM

## 2020-08-31 DIAGNOSIS — E78.5 TYPE 2 DIABETES MELLITUS WITH HYPERLIPIDEMIA: ICD-10-CM

## 2020-08-31 DIAGNOSIS — E11.65 TYPE 2 DIABETES MELLITUS WITH HYPERGLYCEMIA, WITHOUT LONG-TERM CURRENT USE OF INSULIN: Primary | ICD-10-CM

## 2020-08-31 PROCEDURE — 95251 PR GLUCOSE MONITOR, 72 HOUR, PHYS INTERP: ICD-10-PCS | Mod: ,,, | Performed by: NURSE PRACTITIONER

## 2020-08-31 PROCEDURE — 99999 PR PBB SHADOW E&M-EST. PATIENT-LVL II: ICD-10-PCS | Mod: PBBFAC,,, | Performed by: DIETITIAN, REGISTERED

## 2020-08-31 PROCEDURE — 95251 CONT GLUC MNTR ANALYSIS I&R: CPT | Mod: ,,, | Performed by: NURSE PRACTITIONER

## 2020-08-31 PROCEDURE — 99999 PR PBB SHADOW E&M-EST. PATIENT-LVL II: CPT | Mod: PBBFAC,,, | Performed by: DIETITIAN, REGISTERED

## 2020-08-31 NOTE — TELEPHONE ENCOUNTER
Spoke with Britney will drop sensor off and will schedule an appointment with Dr. Lopez because patient's arm is swollen

## 2020-08-31 NOTE — TELEPHONE ENCOUNTER
----- Message from Jackie Glaser sent at 8/31/2020  3:05 PM CDT -----  Contact: Pam  Pt wants to know if the monitor can be picked up or does the pt need to come in and be seen also. Please call back appt is today at 4:30pm      Contact Info

## 2020-08-31 NOTE — PROGRESS NOTES
Diabetes Education  Author: Milagros Amaral RD, CDE  Date: 8/31/2020    Diabetes Care Management Summary  Diabetes Education Record Assessment/Progress: Comprehensive/Group  Current Diabetes Risk Level: Moderate     Last A1c:   Lab Results   Component Value Date    HGBA1C 5.9 03/26/2020     Last visit with Diabetes Educator: Last Education Visit: Not Found      Diabetes Type  Diabetes Type : Type II    Diabetes History  Diabetes Diagnosis: 5-10 years  Current Treatment: Oral Medication, Insulin, Diet  Reviewed Problem List with Patient: Yes    Health Maintenance was reviewed today with patient. Discussed with patient importance of routine eye exams, foot exams/foot care, blood work (i.e.: A1c, microalbumin, and lipid), dental visits, yearly flu vaccine, and pneumonia vaccine as indicated by PCP. Patient verbalized understanding.     Health Maintenance Topics with due status: Not Due       Topic Last Completion Date    Influenza Vaccine 11/19/2011    TETANUS VACCINE 04/08/2019    DEXA SCAN 04/15/2019    Hemoglobin A1c 03/26/2020    Foot Exam 06/24/2020    Eye Exam 07/13/2020    Urine Microalbumin 07/17/2020     Health Maintenance Due   Topic Date Due    Shingles Vaccine (2 of 3) 08/10/2016       Nutrition  What type of sweetener do you use?: sugar  What type of beverages do you drink?: regular soda/tea, milk, water(cutting back on sweet tea and rootbeer)  Meal Plan 24 Hour Recall - Breakfast: toast and jelly or sometimes skips, coffee  Meal Plan 24 Hour Recall - Snack: Grazing throughout the day --- sun chips and grapes    Monitoring   Self Monitoring : BG are running 110-244  Blood Glucose Logs: Yes  Do you use a personal continuous glucose monitor?: No  In the last month, how often have you had a low blood sugar reaction?: never  Can you tell when your blood sugar is too high?: no                    Exercise   Exercise Type: none  Frequency: Never    Current Diabetes Treatment   Current Treatment: Oral Medication,  Insulin, Diet    Social History  Preferred Learning Method: Face to Face  Primary Support: Self, Daughter, Son, Family  Smoking Status: Never a Smoker  Alcohol Use: Rarely                                Barriers to Change  Barriers to Change: Cognitive  Learning Challenges : None    Readiness to Learn   Readiness to Learn : Acceptance    Cultural Influences  Cultural Influences: No    Diabetes Education Assessment/Progress  Diabetes Disease Process (diabetes disease process and treatment options): Not Covered/Deferred, Needs Instruction, Individual Session  Nutrition (Incorporating nutritional management into one's lifestyle): Individual Session, Requires Assistance Family/SO, Needs Instruction, Discussion  Physical Activity (incorporating physical activity into one's lifestyle): Individual Session, Requires Assistance Family/SO, Needs Instruction, Discussion  Medications (states correct name, dose, onset, peak, duration, side effects & timing of meds): Individual Session, Discussion, Requires Assistance Family/SO, Comprehends Key Points  Monitoring (monitoring blood glucose/other parameters & using results): Individual Session, Written Materials Provided, Discussion, Requires Assistance Family/SO, Comprehends Key Points  Acute Complications (preventing, detecting, and treating acute complications): Individual Session, Needs Instruction, Requires Assistance Family/SO, Discussion  Chronic Complications (preventing, detecting, and treating chronic complications): Individual Session, Needs Instruction, Requires Assistance Family/SO, Discussion  Clinical (diabetes, other pertinent medical history, and relevant comorbidities reviewed during visit): Individual Session, Discussion, Needs Instruction, Requires Assistance Family/SO  Cognitive (knowledge of self-management skills, functional health literacy): Individual Session, Not Covered/Deferred, Needs Instruction, Requires Assistance Family/SO  Psychosocial (emotional  response to diabetes): Individual Session, Not Covered/Deferred, Needs Instruction, Requires Assistance Family/SO  Diabetes Distress and Support Systems: Individual Session, Not Covered/Deferred, Needs Instruction, Requires Assistance Family/SO  Behavioral (readiness for change, lifestyle practices, self-care behaviors): Individual Session, Not Covered/Deferred, Needs Instruction, Requires Assistance Family/SO    Goals  Patient has selected/evaluated goals during today's session: No         Diabetes Care Plan/Intervention  Education Plan/Intervention: Individual Follow-Up DSMT    Diabetes Meal Plan  Restrictions: Low Fat, Low Sodium, Restricted Carbohydrate  Calories: 1500  Carbohydrate Per Meal: 30-45g  Carbohydrate Per Snack : 7-15g  Fat: 42  Protein: 113    Today's Self-Management Care Plan was developed with the patient's input and is based on barriers identified during today's assessment.    The long and short-term goals in the care plan were written with the patient/caregiver's input. The patient has agreed to work toward these goals to improve her overall diabetes control.      The patient received a copy of today's self-management plan and verbalized understanding of the care plan, goals, and all of today's instructions.      The patient was encouraged to communicate with her physician and care team regarding her condition(s) and treatment.  I provided the patient with my contact information today and encouraged her to contact me via phone or patient portal as needed.     Education Units of Time   Time Spent: 30 min

## 2020-09-01 ENCOUNTER — TELEPHONE (OUTPATIENT)
Dept: DIABETES | Facility: CLINIC | Age: 75
End: 2020-09-01

## 2020-09-01 NOTE — TELEPHONE ENCOUNTER
Calling to review professional CGM results   Spoke with patient's daughter as patient has dementia.   Discussed results and prandial excursions.    Her daughter reports that she is difficult to manage as patient has dementia and is not following recommended diet  Her daughter does not want to add in any additional medications at this time.  Discussed with her that her control is reasonable given circumstances of having dementia and cancer.  Do not want patient to have hypoglycemia.  She verbalized understanding and all questions answered.  Encouraged  daughter to call clinic for any questions or concerns

## 2020-09-08 ENCOUNTER — OFFICE VISIT (OUTPATIENT)
Dept: PRIMARY CARE CLINIC | Facility: CLINIC | Age: 75
End: 2020-09-08
Payer: MEDICARE

## 2020-09-08 VITALS
OXYGEN SATURATION: 98 % | WEIGHT: 126.75 LBS | RESPIRATION RATE: 18 BRPM | HEART RATE: 72 BPM | TEMPERATURE: 99 F | HEIGHT: 62 IN | SYSTOLIC BLOOD PRESSURE: 110 MMHG | DIASTOLIC BLOOD PRESSURE: 66 MMHG | BODY MASS INDEX: 23.32 KG/M2

## 2020-09-08 DIAGNOSIS — R60.0 ARM EDEMA: Primary | ICD-10-CM

## 2020-09-08 DIAGNOSIS — Z17.0 MALIGNANT NEOPLASM OF UPPER-OUTER QUADRANT OF RIGHT BREAST IN FEMALE, ESTROGEN RECEPTOR POSITIVE: ICD-10-CM

## 2020-09-08 DIAGNOSIS — C50.411 MALIGNANT NEOPLASM OF UPPER-OUTER QUADRANT OF RIGHT BREAST IN FEMALE, ESTROGEN RECEPTOR POSITIVE: ICD-10-CM

## 2020-09-08 DIAGNOSIS — Z86.16 HISTORY OF 2019 NOVEL CORONAVIRUS DISEASE (COVID-19): ICD-10-CM

## 2020-09-08 DIAGNOSIS — Z23 NEED FOR VACCINATION: ICD-10-CM

## 2020-09-08 PROCEDURE — 90662 IIV NO PRSV INCREASED AG IM: CPT | Mod: PBBFAC,PN

## 2020-09-08 PROCEDURE — 99215 OFFICE O/P EST HI 40 MIN: CPT | Mod: PBBFAC,PN | Performed by: FAMILY MEDICINE

## 2020-09-08 PROCEDURE — 99214 PR OFFICE/OUTPT VISIT, EST, LEVL IV, 30-39 MIN: ICD-10-PCS | Mod: S$PBB,,, | Performed by: FAMILY MEDICINE

## 2020-09-08 PROCEDURE — 99214 OFFICE O/P EST MOD 30 MIN: CPT | Mod: S$PBB,,, | Performed by: FAMILY MEDICINE

## 2020-09-08 PROCEDURE — 99999 PR PBB SHADOW E&M-EST. PATIENT-LVL V: CPT | Mod: PBBFAC,,, | Performed by: FAMILY MEDICINE

## 2020-09-08 PROCEDURE — 99999 PR PBB SHADOW E&M-EST. PATIENT-LVL V: ICD-10-PCS | Mod: PBBFAC,,, | Performed by: FAMILY MEDICINE

## 2020-09-08 NOTE — PROGRESS NOTES
Verified pt ID using name and . Verified allergies. Pt denies any egg allergies or any previous reactions to flu vaccine. Administered high dose flu vaccine in left deltoid per physician order using aseptic technique. Aspirated and no blood return noted. Pt tolerated well with no adverse reactions noted.

## 2020-09-08 NOTE — PROGRESS NOTES
"Subjective:       Patient ID: Melissa Sood is a 75 y.o. female.    Chief Complaint: Edema (complaining of right arm swelling x2 weeks )    Complains of right arm swelling for past 2 weeks.  Denies discomfort.  No known injury.  No redness or warmth.  No fevers or chills.  Denies chest pain or shortness of breath.  Does report history of family members with DVTs, and patient also has metastatic right breast cancer and was diagnosed with COVID-19 in late July.    Review of Systems   Constitutional: Negative for chills and fever.   Respiratory: Negative for shortness of breath.    Cardiovascular: Negative for chest pain.   Skin: Negative for color change and wound.   Psychiatric/Behavioral: Positive for confusion.       Objective:      Vitals:    09/08/20 1423   BP: 110/66   BP Location: Left arm   Patient Position: Sitting   BP Method: Medium (Manual)   Pulse: 72   Resp: 18   Temp: 98.5 °F (36.9 °C)   TempSrc: Oral   SpO2: 98%   Weight: 57.5 kg (126 lb 12.2 oz)   Height: 5' 2" (1.575 m)     Physical Exam  Vitals signs and nursing note reviewed.   Constitutional:       Appearance: She is well-developed.   HENT:      Head: Normocephalic and atraumatic.   Cardiovascular:      Rate and Rhythm: Normal rate and regular rhythm.      Pulses: Normal pulses.           Radial pulses are 2+ on the right side and 2+ on the left side.      Heart sounds: Normal heart sounds.      Comments: 2+ right brachial pulse; 1+ pitting edema to right upper extremity, no erythema, warmth or tenderness  Pulmonary:      Effort: Pulmonary effort is normal.      Breath sounds: Normal breath sounds.   Skin:     General: Skin is warm and dry.   Neurological:      Mental Status: She is alert. Mental status is at baseline. She is disoriented.   Psychiatric:         Mood and Affect: Mood normal.         Lab Results   Component Value Date    WBC 4.90 07/24/2020    HGB 13.0 07/24/2020    HCT 39.0 07/24/2020     07/24/2020    CHOL 107 04/16/2019    " TRIG 53 04/16/2019    HDL 52 04/16/2019    ALT 19 07/24/2020    AST 23 07/24/2020     07/24/2020    K 4.3 07/24/2020     07/24/2020    CREATININE 0.8 07/24/2020    BUN 15 07/24/2020    CO2 25 07/24/2020    TSH 1.354 01/30/2018    HGBA1C 5.9 03/26/2020      Assessment:       1. Arm edema    2. Malignant neoplasm of upper-outer quadrant of right breast in female, estrogen receptor positive    3. History of 2019 novel coronavirus disease (COVID-19)    4. Need for vaccination        Plan:       Arm edema  -     US Upper Extremity Veins Right; Future; Expected date: 09/08/2020  Ultrasound positive for extensive DVT.  Advised to go to the emergency room for further evaluation and management  Malignant neoplasm of upper-outer quadrant of right breast in female, estrogen receptor positive  -     US Upper Extremity Veins Right; Future; Expected date: 09/08/2020    History of 2019 novel coronavirus disease (COVID-19)  -     US Upper Extremity Veins Right; Future; Expected date: 09/08/2020    Need for vaccination  -     Influenza - High Dose (65+) (PF) (IM)      Medication List with Changes/Refills   Current Medications    ALPELISIB (PIQRAY) 300 MG/DAY (150 MG X 2) TAB    Take 300 mg by mouth once daily.    ALPRAZOLAM (XANAX) 0.25 MG TABLET    TAKE 1 TABLET BY MOUTH THREE TIMES DAILY AS NEEDED FOR ANXIETY    ASPIRIN (ECOTRIN) 81 MG EC TABLET    Take 81 mg by mouth once daily.    ATORVASTATIN (LIPITOR) 20 MG TABLET    TAKE 1 TABLET BY MOUTH ONCE DAILY    BLOOD SUGAR DIAGNOSTIC STRP    1 strip by Misc.(Non-Drug; Combo Route) route 4 (four) times daily with meals and nightly.    BLOOD-GLUCOSE METER (FREESTYLE SYSTEM KIT) KIT    Use as instructed    DOCUSATE SODIUM (COLACE) 100 MG CAPSULE    Take 1 capsule (100 mg total) by mouth 2 (two) times daily.    DONEPEZIL (ARICEPT) 10 MG TABLET    Take 1 tablet by mouth every evening.    ESCITALOPRAM OXALATE (LEXAPRO) 20 MG TABLET    TAKE 1 TABLET BY MOUTH EVERY DAY     "EXEMESTANE (AROMASIN) 25 MG TABLET    TAKE 1 TABLET BY MOUTH EVERY DAY    HYDROCODONE-ACETAMINOPHEN (NORCO) 5-325 MG PER TABLET    Take 1 tablet by mouth every 6 (six) hours as needed for Pain.    INSULIN (LANTUS SOLOSTAR U-100 INSULIN) GLARGINE 100 UNITS/ML (3ML) SUBQ PEN    Inject 10 Units into the skin every evening. Titrate up to FBG <150. Max TDD of 50 units    LANCETS (FREESTYLE LANCETS) 28 GAUGE MISC    1 lancet by Misc.(Non-Drug; Combo Route) route 4 (four) times daily with meals and nightly.    MEMANTINE (NAMENDA XR) 28 MG CSPX    memantine 28 mg capsule sprinkle,extended release 24hr   TAKE 1 CAPSULE BY MOUTH EVERY DAY    METFORMIN (GLUCOPHAGE) 1000 MG TABLET    Take 1 tablet (1,000 mg total) by mouth 2 (two) times daily with meals.    ONDANSETRON (ZOFRAN) 8 MG TABLET    Take 1 tablet (8 mg total) by mouth every 8 (eight) hours as needed for Nausea.    PEN NEEDLE, DIABETIC (BD ULTRA-FINE SHARA PEN NEEDLE) 32 GAUGE X 5/32" NDLE    To use daily with insulin injection    POLYETHYLENE GLYCOL (GLYCOLAX) 17 GRAM/DOSE POWDER    Take 17 g by mouth once daily.    SERTRALINE (ZOLOFT) 50 MG TABLET    sertraline 50 mg tablet   TK 1 T PO QD FOR 2 WKS THEN TK 1 T PO BID   Discontinued Medications    CYANOCOBALAMIN 1,000 MCG/ML INJECTION    ADM 1 ML IM Q WK    ESCITALOPRAM OXALATE (LEXAPRO) 20 MG TABLET    TAKE 1 TABLET BY MOUTH EVERY DAY    PROMETHAZINE (PHENERGAN) 25 MG TABLET    Take 1 tablet (25 mg total) by mouth every 4 to 6 hours as needed.         "

## 2020-09-14 ENCOUNTER — OFFICE VISIT (OUTPATIENT)
Dept: PRIMARY CARE CLINIC | Facility: CLINIC | Age: 75
End: 2020-09-14
Payer: MEDICARE

## 2020-09-14 VITALS
HEART RATE: 67 BPM | TEMPERATURE: 99 F | DIASTOLIC BLOOD PRESSURE: 70 MMHG | BODY MASS INDEX: 23.06 KG/M2 | WEIGHT: 125.31 LBS | HEIGHT: 62 IN | RESPIRATION RATE: 16 BRPM | OXYGEN SATURATION: 98 % | SYSTOLIC BLOOD PRESSURE: 124 MMHG

## 2020-09-14 DIAGNOSIS — I82.621 ACUTE DEEP VEIN THROMBOSIS (DVT) OF RIGHT UPPER EXTREMITY, UNSPECIFIED VEIN: Primary | ICD-10-CM

## 2020-09-14 PROCEDURE — 99213 OFFICE O/P EST LOW 20 MIN: CPT | Mod: S$GLB,,, | Performed by: FAMILY MEDICINE

## 2020-09-14 PROCEDURE — 1101F PT FALLS ASSESS-DOCD LE1/YR: CPT | Mod: CPTII,S$GLB,, | Performed by: FAMILY MEDICINE

## 2020-09-14 PROCEDURE — 99213 PR OFFICE/OUTPT VISIT, EST, LEVL III, 20-29 MIN: ICD-10-PCS | Mod: S$GLB,,, | Performed by: FAMILY MEDICINE

## 2020-09-14 PROCEDURE — 1159F MED LIST DOCD IN RCRD: CPT | Mod: S$GLB,,, | Performed by: FAMILY MEDICINE

## 2020-09-14 PROCEDURE — 1159F PR MEDICATION LIST DOCUMENTED IN MEDICAL RECORD: ICD-10-PCS | Mod: S$GLB,,, | Performed by: FAMILY MEDICINE

## 2020-09-14 PROCEDURE — 99999 PR PBB SHADOW E&M-EST. PATIENT-LVL V: CPT | Mod: PBBFAC,,, | Performed by: FAMILY MEDICINE

## 2020-09-14 PROCEDURE — 99999 PR PBB SHADOW E&M-EST. PATIENT-LVL V: ICD-10-PCS | Mod: PBBFAC,,, | Performed by: FAMILY MEDICINE

## 2020-09-14 PROCEDURE — 1101F PR PT FALLS ASSESS DOC 0-1 FALLS W/OUT INJ PAST YR: ICD-10-PCS | Mod: CPTII,S$GLB,, | Performed by: FAMILY MEDICINE

## 2020-09-14 NOTE — PROGRESS NOTES
"Subjective:       Patient ID: Melissa Sood is a 75 y.o. female.    Chief Complaint: Hospital Follow Up    Right upper extremity ultrasound showed extensive DVT in brachial, axillary, subclavian right internal jugular veins.  She was sent from outpatient radiology to the emergency room for further evaluation.  CTA of the chest negative for pulmonary embolism.  She was discharged home on Eliquis, which she has been taking consistently.  Says she is feeling fine, denies chest pain, palpitations or shortness of breath.  Trying to move up her Heme-Onc follow-up from mid-October to sometime in the next 1-2 weeks.  No prior history of thromboembolic disease.  No history of stroke, MI or CAD.    Review of Systems   Constitutional: Negative for chills and fever.   HENT: Negative for trouble swallowing.    Respiratory: Negative for shortness of breath.    Cardiovascular: Negative for chest pain and leg swelling.   Gastrointestinal: Negative for blood in stool.   Genitourinary: Negative for hematuria.   Skin: Negative for rash and wound.   Hematological: Bruises/bleeds easily.   Psychiatric/Behavioral: Positive for confusion.       Objective:      Vitals:    09/14/20 0859   BP: 124/70   BP Location: Left arm   Patient Position: Sitting   BP Method: Medium (Manual)   Pulse: 67   Resp: 16   Temp: 98.7 °F (37.1 °C)   TempSrc: Oral   SpO2: 98%   Weight: 56.8 kg (125 lb 5.3 oz)   Height: 5' 2" (1.575 m)     Physical Exam  Vitals signs and nursing note reviewed.   Constitutional:       Appearance: She is well-developed.   HENT:      Head: Normocephalic and atraumatic.   Cardiovascular:      Rate and Rhythm: Normal rate and regular rhythm.      Pulses: Normal pulses.           Radial pulses are 2+ on the right side and 2+ on the left side.      Heart sounds: Normal heart sounds.      Comments: 2+ right brachial pulse; 1+ pitting edema to right upper extremity, no erythema, warmth or tenderness  Pulmonary:      Effort: Pulmonary " effort is normal.      Breath sounds: Normal breath sounds.   Skin:     General: Skin is warm and dry.   Neurological:      Mental Status: She is alert. Mental status is at baseline. She is disoriented.   Psychiatric:         Mood and Affect: Mood normal.         Lab Results   Component Value Date    WBC 7.50 09/08/2020    HGB 12.7 09/08/2020    HCT 38.0 09/08/2020     09/08/2020    CHOL 107 04/16/2019    TRIG 53 04/16/2019    HDL 52 04/16/2019    ALT 17 09/08/2020    AST 22 09/08/2020     09/08/2020    K 3.6 09/08/2020     09/08/2020    CREATININE 0.8 09/08/2020    BUN 17 09/08/2020    CO2 27 09/08/2020    TSH 1.354 01/30/2018    INR 1.1 09/08/2020    HGBA1C 5.9 03/26/2020   US Upper Extremity Veins Right  Order: [id number]  Status:  Edited Result - FINAL   Visible to patient:  Yes (Patient Portal)   Next appt:  10/09/2020 at 08:45 AM in Lab (LAB, [facility])   Dx:  Arm edema; Malignant neoplasm of uppe...  Details    Reading Physician Reading Date Result Priority   Bright Sierra DO  [phone] 9/8/2020 STAT      Addenda        Dr. Sierra discussed critical findings with Dr. Lopez by telephone at 18:42 on 09/08/2020.        Electronically signed by: Bright Sierra  Date:                                            09/08/2020  Time:                                           18:46   Signed by Bright Sierra DO on 9/8/2020 18:49      Narrative & Impression     EXAMINATION:  US UPPER EXTREMITY VEINS RIGHT     CLINICAL HISTORY:  Arm edema.     TECHNIQUE:  Duplex and color flow Doppler evaluation and dynamic compression was performed of the right upper extremity veins.     COMPARISON:  None available.     FINDINGS:  Central veins: There is nonocclusive deep vein thrombosis within the central internal jugular vein, subclavian vein, and axillary vein.     Arm veins: There is nonocclusive deep vein thrombosis within the brachial vein.  There is superficial thrombophlebitis within the cephalic vein.   The basilic vein is patent and compressible.     Contralateral subclavian/internal jugular veins: The left subclavian and internal jugular veins are patent and free of thrombus.     Other findings: There is a complex lesion within the right supraclavicular soft tissues measuring 3.4 x 2.0 x 2.0 cm, likely corresponding to the supraclavicular lymph node as seen on prior PET-CT.     Impression:     1. Deep vein thrombosis within the right internal jugular, subclavian, axillary, and brachial veins.  2. Superficial thrombophlebitis of the right cephalic vein.  3. Complex lesion within the supraclavicular soft tissues corresponding to a metastatic lymph node as seen on prior PET-CT.  This report was flagged in Epic as abnormal.     The patient was sent to the emergency room by RT Malina after study completion.        Electronically signed by: Bright Sierra  Date:                                            09/08/2020  Time:                                           17:59             Last Resulted: 09/08/20 18:46              Assessment:       1. Acute deep vein thrombosis (DVT) of right upper extremity, unspecified vein        Plan:       Acute deep vein thrombosis (DVT) of right upper extremity, unspecified vein  Continue Eliquis, will need anticoagulation for a minimum of 3 months.  DC aspirin, as she has no history of stroke or CAD, and is at increased risk of bleeding.  Follow-up with Heme-Onc as scheduled    Medication List with Changes/Refills   Current Medications    ALPELISIB (PIQRAY) 300 MG/DAY (150 MG X 2) TAB    Take 300 mg by mouth once daily.    ALPRAZOLAM (XANAX) 0.25 MG TABLET    TAKE 1 TABLET BY MOUTH THREE TIMES DAILY AS NEEDED FOR ANXIETY    APIXABAN (ELIQUIS) 5 MG TAB    Take 2 tablets (10 mg total) by mouth 2 (two) times daily for 7 days, THEN 1 tablet (5 mg total) 2 (two) times daily.    ATORVASTATIN (LIPITOR) 20 MG TABLET    TAKE 1 TABLET BY MOUTH ONCE DAILY    BLOOD SUGAR DIAGNOSTIC STRP     "1 strip by Misc.(Non-Drug; Combo Route) route 4 (four) times daily with meals and nightly.    BLOOD-GLUCOSE METER (FREESTYLE SYSTEM KIT) KIT    Use as instructed    DOCUSATE SODIUM (COLACE) 100 MG CAPSULE    Take 1 capsule (100 mg total) by mouth 2 (two) times daily.    DONEPEZIL (ARICEPT) 10 MG TABLET    Take 1 tablet by mouth every evening.    ESCITALOPRAM OXALATE (LEXAPRO) 20 MG TABLET    TAKE 1 TABLET BY MOUTH EVERY DAY    EXEMESTANE (AROMASIN) 25 MG TABLET    TAKE 1 TABLET BY MOUTH EVERY DAY    HYDROCODONE-ACETAMINOPHEN (NORCO) 5-325 MG PER TABLET    Take 1 tablet by mouth every 6 (six) hours as needed for Pain.    INSULIN (LANTUS SOLOSTAR U-100 INSULIN) GLARGINE 100 UNITS/ML (3ML) SUBQ PEN    Inject 10 Units into the skin every evening. Titrate up to FBG <150. Max TDD of 50 units    LANCETS (FREESTYLE LANCETS) 28 GAUGE MISC    1 lancet by Misc.(Non-Drug; Combo Route) route 4 (four) times daily with meals and nightly.    MEMANTINE (NAMENDA XR) 28 MG CSPX    memantine 28 mg capsule sprinkle,extended release 24hr   TAKE 1 CAPSULE BY MOUTH EVERY DAY    METFORMIN (GLUCOPHAGE) 1000 MG TABLET    Take 1 tablet (1,000 mg total) by mouth 2 (two) times daily with meals.    ONDANSETRON (ZOFRAN) 8 MG TABLET    Take 1 tablet (8 mg total) by mouth every 8 (eight) hours as needed for Nausea.    PEN NEEDLE, DIABETIC (BD ULTRA-FINE SHARA PEN NEEDLE) 32 GAUGE X 5/32" NDLE    To use daily with insulin injection    POLYETHYLENE GLYCOL (GLYCOLAX) 17 GRAM/DOSE POWDER    Take 17 g by mouth once daily.    SERTRALINE (ZOLOFT) 50 MG TABLET    sertraline 50 mg tablet   TK 1 T PO QD FOR 2 WKS THEN TK 1 T PO BID   Discontinued Medications    ASPIRIN (ECOTRIN) 81 MG EC TABLET    Take 81 mg by mouth once daily.           "

## 2020-10-12 ENCOUNTER — OFFICE VISIT (OUTPATIENT)
Dept: HEMATOLOGY/ONCOLOGY | Facility: CLINIC | Age: 75
End: 2020-10-12
Payer: MEDICARE

## 2020-10-12 VITALS
HEART RATE: 74 BPM | BODY MASS INDEX: 22.04 KG/M2 | WEIGHT: 120.5 LBS | RESPIRATION RATE: 17 BRPM | TEMPERATURE: 97 F | DIASTOLIC BLOOD PRESSURE: 64 MMHG | SYSTOLIC BLOOD PRESSURE: 125 MMHG

## 2020-10-12 DIAGNOSIS — G30.9 ALZHEIMER'S DEMENTIA WITHOUT BEHAVIORAL DISTURBANCE, UNSPECIFIED TIMING OF DEMENTIA ONSET: ICD-10-CM

## 2020-10-12 DIAGNOSIS — F02.80 ALZHEIMER'S DEMENTIA WITHOUT BEHAVIORAL DISTURBANCE, UNSPECIFIED TIMING OF DEMENTIA ONSET: ICD-10-CM

## 2020-10-12 DIAGNOSIS — C50.911 RECURRENT MALIGNANT NEOPLASM OF RIGHT BREAST: Chronic | ICD-10-CM

## 2020-10-12 PROCEDURE — 1126F AMNT PAIN NOTED NONE PRSNT: CPT | Mod: S$GLB,,, | Performed by: INTERNAL MEDICINE

## 2020-10-12 PROCEDURE — 99214 OFFICE O/P EST MOD 30 MIN: CPT | Mod: S$GLB,,, | Performed by: INTERNAL MEDICINE

## 2020-10-12 PROCEDURE — 1101F PR PT FALLS ASSESS DOC 0-1 FALLS W/OUT INJ PAST YR: ICD-10-PCS | Mod: S$GLB,,, | Performed by: INTERNAL MEDICINE

## 2020-10-12 PROCEDURE — 1101F PT FALLS ASSESS-DOCD LE1/YR: CPT | Mod: S$GLB,,, | Performed by: INTERNAL MEDICINE

## 2020-10-12 PROCEDURE — 99214 PR OFFICE/OUTPT VISIT, EST, LEVL IV, 30-39 MIN: ICD-10-PCS | Mod: S$GLB,,, | Performed by: INTERNAL MEDICINE

## 2020-10-12 PROCEDURE — 1159F MED LIST DOCD IN RCRD: CPT | Mod: S$GLB,,, | Performed by: INTERNAL MEDICINE

## 2020-10-12 PROCEDURE — 1126F PR PAIN SEVERITY QUANTIFIED, NO PAIN PRESENT: ICD-10-PCS | Mod: S$GLB,,, | Performed by: INTERNAL MEDICINE

## 2020-10-12 PROCEDURE — 1159F PR MEDICATION LIST DOCUMENTED IN MEDICAL RECORD: ICD-10-PCS | Mod: S$GLB,,, | Performed by: INTERNAL MEDICINE

## 2020-10-12 NOTE — ASSESSMENT & PLAN NOTE
Patient is doing well at this time and remains on the Piqray.  She is tolerating this well and will get tumor markers done now to see where she is.  I will have her back with me again in 2 months.  Continue current care.

## 2020-10-12 NOTE — PROGRESS NOTES
PROGRESS NOTE    Subjective:       Patient ID: Melissa Sood is a 75 y.o. female.    Breast Cancer:  Dx: 2003  T2N1M0 stage II  neoadj AC-T, surgery-XRT Arimidex-Femara till 2/2011  BRCA 1/2 neg  ER pos, Her 2 neg  Recurrence 3/2018  Started Ibrance/Exemestane 4/30/2018-5/1/2020.  Piqray 5/15/2020- 6/7/2020(held due to high glu)    Chief Complaint:  No chief complaint on file.  breast cancer follow up.      History of Present Illness:   Melissa Sood is a 75 y.o. female who presents for follow up    CTA chest 9/8/2020 for PE negative.   RUE DVT Dx 9/9/2020.  Started on Eliquis 5mg bid.     Patient now on 150mg of piqray and is tolerating this better.     Patient is feeling well with no new complaints.  Blood sugars have been under better control.     PIK3CA--path 4/2/2018    PET impression 8/10/2020:  1. Interval improvement with decreased FDG hypermetabolism associated with right supraclavicular lymph node, and resolution of previous hypermetabolic superior mediastinal lymph node.  2. No new findings of active metastatic disease.  3. Stable diffuse sclerotic osseous metastases, non hypermetabolic and consistent with treated or quiescent disease.  4. Resolution of previous left lower lobe nodular opacities, with no new suspicious pulmonary nodules.    Labs 6/24/2019:  WBC 2.5(ANC 1.3)  Hb 11.6  Plt: 110--228  CMP unremark  CA 15-3:  42.9--44--55  CA 27.29: 62.5--68--69      PET scan impression 8/10/2020:  1. Interval improvement with decreased FDG hypermetabolism associated with right supraclavicular lymph node, and resolution of previous hypermetabolic superior mediastinal lymph node.  2. No new findings of active metastatic disease.  3. Stable diffuse sclerotic osseous metastases, non hypermetabolic and consistent with treated or quiescent disease.  4. Resolution of previous left lower lobe nodular opacities, with no new suspicious pulmonary nodules    Family  and Social history reviewed and is unchanged from 9/22/2014      ROS:  Review of Systems   Constitutional: Negative for appetite change, fever and unexpected weight change.   Eyes: Negative for visual disturbance.   Respiratory: Negative for cough and shortness of breath.    Cardiovascular: Negative for chest pain and leg swelling.   Gastrointestinal: Positive for diarrhea. Negative for abdominal pain and blood in stool.   Genitourinary: Negative for frequency and hematuria.   Musculoskeletal: Negative for back pain.   Skin: Negative for rash.   Neurological: Negative for headaches.   Hematological: Negative for adenopathy.   Psychiatric/Behavioral: The patient is not nervous/anxious.           Current Outpatient Medications:     alpelisib (PIQRAY) 300 mg/day (150 mg x 2) Tab, Take 300 mg by mouth once daily., Disp: 60 tablet, Rfl: 6    ALPRAZolam (XANAX) 0.25 MG tablet, TAKE 1 TABLET BY MOUTH THREE TIMES DAILY AS NEEDED FOR ANXIETY, Disp: 30 tablet, Rfl: 0    apixaban (ELIQUIS) 5 mg Tab, Take 2 tablets (10 mg total) by mouth 2 (two) times daily for 7 days, THEN 1 tablet (5 mg total) 2 (two) times daily., Disp: 88 tablet, Rfl: 3    atorvastatin (LIPITOR) 20 MG tablet, TAKE 1 TABLET BY MOUTH ONCE DAILY, Disp: 90 tablet, Rfl: 1    blood sugar diagnostic Strp, 1 strip by Misc.(Non-Drug; Combo Route) route 4 (four) times daily with meals and nightly., Disp: 150 each, Rfl: 11    blood-glucose meter (FREESTYLE SYSTEM KIT) kit, Use as instructed, Disp: 1 each, Rfl: 0    docusate sodium (COLACE) 100 MG capsule, Take 1 capsule (100 mg total) by mouth 2 (two) times daily., Disp: 60 capsule, Rfl: 0    donepezil (ARICEPT) 10 MG tablet, Take 1 tablet by mouth every evening., Disp: , Rfl:     escitalopram oxalate (LEXAPRO) 20 MG tablet, TAKE 1 TABLET BY MOUTH EVERY DAY, Disp: 90 tablet, Rfl: 0    exemestane (AROMASIN) 25 mg tablet, TAKE 1 TABLET BY MOUTH EVERY DAY, Disp: 30 tablet, Rfl: 11    HYDROcodone-acetaminophen  "(NORCO) 5-325 mg per tablet, Take 1 tablet by mouth every 6 (six) hours as needed for Pain., Disp: 60 tablet, Rfl: 0    insulin (LANTUS SOLOSTAR U-100 INSULIN) glargine 100 units/mL (3mL) SubQ pen, Inject 10 Units into the skin every evening. Titrate up to FBG <150. Max TDD of 50 units, Disp: 1 Box, Rfl: 3    lancets (FREESTYLE LANCETS) 28 gauge Misc, 1 lancet by Misc.(Non-Drug; Combo Route) route 4 (four) times daily with meals and nightly., Disp: 150 each, Rfl: 11    memantine (NAMENDA XR) 28 mg CSpX, memantine 28 mg capsule sprinkle,extended release 24hr  TAKE 1 CAPSULE BY MOUTH EVERY DAY, Disp: , Rfl:     metFORMIN (GLUCOPHAGE) 1000 MG tablet, Take 1 tablet (1,000 mg total) by mouth 2 (two) times daily with meals., Disp: 180 tablet, Rfl: 1    ondansetron (ZOFRAN) 8 MG tablet, Take 1 tablet (8 mg total) by mouth every 8 (eight) hours as needed for Nausea., Disp: 30 tablet, Rfl: 5    pen needle, diabetic (BD ULTRA-FINE SHARA PEN NEEDLE) 32 gauge x 5/32" Ndle, To use daily with insulin injection, Disp: 100 each, Rfl: 3    polyethylene glycol (GLYCOLAX) 17 gram/dose powder, Take 17 g by mouth once daily., Disp: 238 g, Rfl: 0    sertraline (ZOLOFT) 50 MG tablet, sertraline 50 mg tablet  TK 1 T PO QD FOR 2 WKS THEN TK 1 T PO BID, Disp: , Rfl:         Objective:       Physical Examination:     /64   Pulse 74   Temp 96.6 °F (35.9 °C)   Resp 17   Wt 54.7 kg (120 lb 8 oz)   BMI 22.04 kg/m²     Physical Exam  Constitutional:       Appearance: She is well-developed.   HENT:      Head: Normocephalic and atraumatic.      Right Ear: External ear normal.      Left Ear: External ear normal.   Eyes:      Conjunctiva/sclera: Conjunctivae normal.      Pupils: Pupils are equal, round, and reactive to light.   Neck:      Thyroid: No thyromegaly.      Trachea: No tracheal deviation.   Cardiovascular:      Rate and Rhythm: Normal rate and regular rhythm.      Heart sounds: Normal heart sounds.   Pulmonary:      " Effort: Pulmonary effort is normal.      Breath sounds: Normal breath sounds.   Abdominal:      General: Bowel sounds are normal. There is no distension.      Palpations: Abdomen is soft. There is no mass.      Tenderness: There is no abdominal tenderness.   Skin:     Findings: No rash.   Neurological:      Comments: Neuro intact througout   Psychiatric:         Behavior: Behavior normal.         Thought Content: Thought content normal.         Judgment: Judgment normal.         Labs:   Recent Results (from the past 336 hour(s))   CBC auto differential    Collection Time: 10/09/20  9:09 AM   Result Value Ref Range    WBC 6.80 3.90 - 12.70 K/uL    Hemoglobin 13.4 12.0 - 16.0 g/dL    Hematocrit 40.2 37.0 - 48.5 %    Platelets 234 150 - 350 K/uL     CMP  Sodium   Date Value Ref Range Status   10/09/2020 138 136 - 145 mmol/L Final   06/24/2019 141 134 - 144 mmol/L      Potassium   Date Value Ref Range Status   10/09/2020 4.3 3.5 - 5.1 mmol/L Final     Chloride   Date Value Ref Range Status   10/09/2020 102 101 - 111 mmol/L Final   06/24/2019 103 98 - 110 mmol/L      CO2   Date Value Ref Range Status   10/09/2020 27 23 - 29 mmol/L Final     Glucose   Date Value Ref Range Status   10/09/2020 215 (H) 74 - 118 mg/dL Final   06/24/2019 87 70 - 99 mg/dL      BUN, Bld   Date Value Ref Range Status   10/09/2020 16 8 - 23 mg/dL Final     Creatinine   Date Value Ref Range Status   10/09/2020 0.8 0.5 - 1.4 mg/dL Final   06/24/2019 0.82 0.60 - 1.40 mg/dL      Calcium   Date Value Ref Range Status   10/09/2020 9.3 8.6 - 10.0 mg/dL Final     Total Protein   Date Value Ref Range Status   09/08/2020 7.5 6.0 - 8.4 g/dL Final     Albumin   Date Value Ref Range Status   09/08/2020 4.3 3.5 - 5.2 g/dL Final   06/24/2019 4.3 3.1 - 4.7 g/dL      Total Bilirubin   Date Value Ref Range Status   09/08/2020 0.5 0.3 - 1.2 mg/dL Final     Comment:     For infants and newborns, interpretation of results should be based  on gestational age, weight  and in agreement with clinical  observations.  Premature Infant recommended reference ranges:  Up to 24 hours.............<8.0 mg/dL  Up to 48 hours............<12.0 mg/dL  3-5 days..................<15.0 mg/dL  6-29 days.................<15.0 mg/dL       Alkaline Phosphatase   Date Value Ref Range Status   09/08/2020 60 38 - 126 U/L Final     AST   Date Value Ref Range Status   09/08/2020 22 15 - 41 U/L Final     ALT   Date Value Ref Range Status   09/08/2020 17 14 - 54 U/L Final     Anion Gap   Date Value Ref Range Status   10/09/2020 9 8 - 16 mmol/L Final     eGFR if    Date Value Ref Range Status   10/09/2020 >60.0 >60 mL/min/1.73 m^2 Final     eGFR if non    Date Value Ref Range Status   10/09/2020 >60.0 >60 mL/min/1.73 m^2 Final     Comment:     Calculation used to obtain the estimated glomerular filtration  rate (eGFR) is the CKD-EPI equation.        Lab Results   Component Value Date    CEA 2.6 06/24/2019     No results found for: PSA        Assessment/Plan:     Problem List Items Addressed This Visit     Recurrent malignant neoplasm of right breast (Chronic)     Patient is doing well at this time and remains on the Piqray.  She is tolerating this well and will get tumor markers done now to see where she is.  I will have her back with me again in 2 months.  Continue current care.           Relevant Orders    Cancer Antigen 15-3    Cancer Antigen 27-29    CEA    Alzheimer's disease     Patient seems to be doing well and does not seems to be advancing.                 Discussion:   High complexity, high risk medications.   Follow up in about 2 months (around 12/12/2020).      Electronically signed by Rudolph Bryan

## 2020-10-19 ENCOUNTER — PATIENT MESSAGE (OUTPATIENT)
Dept: DIABETES | Facility: CLINIC | Age: 75
End: 2020-10-19

## 2020-10-19 ENCOUNTER — OFFICE VISIT (OUTPATIENT)
Dept: DIABETES | Facility: CLINIC | Age: 75
End: 2020-10-19
Payer: MEDICARE

## 2020-10-19 VITALS
HEART RATE: 70 BPM | SYSTOLIC BLOOD PRESSURE: 135 MMHG | DIASTOLIC BLOOD PRESSURE: 64 MMHG | WEIGHT: 120.56 LBS | HEIGHT: 62 IN | BODY MASS INDEX: 22.19 KG/M2

## 2020-10-19 DIAGNOSIS — F02.80 ALZHEIMER'S DEMENTIA WITHOUT BEHAVIORAL DISTURBANCE, UNSPECIFIED TIMING OF DEMENTIA ONSET: ICD-10-CM

## 2020-10-19 DIAGNOSIS — C50.911 RECURRENT MALIGNANT NEOPLASM OF RIGHT BREAST: Chronic | ICD-10-CM

## 2020-10-19 DIAGNOSIS — E11.65 TYPE 2 DIABETES MELLITUS WITH HYPERGLYCEMIA, WITHOUT LONG-TERM CURRENT USE OF INSULIN: Primary | ICD-10-CM

## 2020-10-19 DIAGNOSIS — E78.5 HYPERLIPIDEMIA, UNSPECIFIED HYPERLIPIDEMIA TYPE: ICD-10-CM

## 2020-10-19 DIAGNOSIS — G30.9 ALZHEIMER'S DEMENTIA WITHOUT BEHAVIORAL DISTURBANCE, UNSPECIFIED TIMING OF DEMENTIA ONSET: ICD-10-CM

## 2020-10-19 PROCEDURE — 1159F MED LIST DOCD IN RCRD: CPT | Mod: S$GLB,,, | Performed by: NURSE PRACTITIONER

## 2020-10-19 PROCEDURE — 3052F PR MOST RECENT HEMOGLOBIN A1C LEVEL 8.0 - < 9.0%: ICD-10-PCS | Mod: CPTII,S$GLB,, | Performed by: NURSE PRACTITIONER

## 2020-10-19 PROCEDURE — 1101F PT FALLS ASSESS-DOCD LE1/YR: CPT | Mod: CPTII,S$GLB,, | Performed by: NURSE PRACTITIONER

## 2020-10-19 PROCEDURE — 1126F AMNT PAIN NOTED NONE PRSNT: CPT | Mod: S$GLB,,, | Performed by: NURSE PRACTITIONER

## 2020-10-19 PROCEDURE — 99214 PR OFFICE/OUTPT VISIT, EST, LEVL IV, 30-39 MIN: ICD-10-PCS | Mod: S$GLB,,, | Performed by: NURSE PRACTITIONER

## 2020-10-19 PROCEDURE — 99214 OFFICE O/P EST MOD 30 MIN: CPT | Mod: S$GLB,,, | Performed by: NURSE PRACTITIONER

## 2020-10-19 PROCEDURE — 1126F PR PAIN SEVERITY QUANTIFIED, NO PAIN PRESENT: ICD-10-PCS | Mod: S$GLB,,, | Performed by: NURSE PRACTITIONER

## 2020-10-19 PROCEDURE — 1101F PR PT FALLS ASSESS DOC 0-1 FALLS W/OUT INJ PAST YR: ICD-10-PCS | Mod: CPTII,S$GLB,, | Performed by: NURSE PRACTITIONER

## 2020-10-19 PROCEDURE — 1159F PR MEDICATION LIST DOCUMENTED IN MEDICAL RECORD: ICD-10-PCS | Mod: S$GLB,,, | Performed by: NURSE PRACTITIONER

## 2020-10-19 PROCEDURE — 99999 PR PBB SHADOW E&M-EST. PATIENT-LVL IV: CPT | Mod: PBBFAC,,, | Performed by: NURSE PRACTITIONER

## 2020-10-19 PROCEDURE — 3052F HG A1C>EQUAL 8.0%<EQUAL 9.0%: CPT | Mod: CPTII,S$GLB,, | Performed by: NURSE PRACTITIONER

## 2020-10-19 PROCEDURE — 99999 PR PBB SHADOW E&M-EST. PATIENT-LVL IV: ICD-10-PCS | Mod: PBBFAC,,, | Performed by: NURSE PRACTITIONER

## 2020-10-19 NOTE — PATIENT INSTRUCTIONS
A1c is up to 8%!!! We need to get this down to less than 8% -- would like to be closer to 7% -- 7.5% at the highest.   Needs to work on diet and eliminating sugary beverages.     STOP Metformin   START Janumet  mg 2 times per day   Continue Lantus 10 units daily in the AM.     Continue to monitor blood sugar readings 2 times per day

## 2020-10-19 NOTE — ASSESSMENT & PLAN NOTE
Uncontrolled  A1c is above goal. BG readings on logs from home are variable as some are postprandial.  Patient's Alzheimer's disease negatively impacting the overall management of diabetes as she relies on her family members to give her her medications and her insulin injections   It will be very difficult for them to get a pre meal blood sugar reading--concerned that the NovoLog/Humalog sliding scale would be use then to treat a postprandial blood sugar reading which would increase the risk for hypoglycemia.  Encouraged continued focus on diet to eliminate sugary beverages and cut back on carbohydrate intake.  Discussed this at length with patient's sister today.         -- Medication Changes:    Continue Lantus 10 units daily  Discontinue plain metformin  Start Janumet  mg tablets b.i.d.        -- Reviewed goals of therapy are to get the best control we can without hypoglycemia.  -- Reviewed patient's current insulin regimen. Clarified proper insulin dose and timing in relation to meals, etc. Insulin injection sites and proper rotation instructed.    -- Advised frequent self blood glucose monitoring.  Patient encouraged to document glucose results and bring them to every clinic visit. -ideally 2 times per day before meals.   -- Hypoglycemia precautions discussed. Instructed on precautions before driving.    -- Call for Bg repeatedly < 90 or > 180.   -- Close adherence to lifestyle changes recommended.   -- Periodic follow ups for eye evaluations, foot care and dental care suggested.

## 2020-10-19 NOTE — ASSESSMENT & PLAN NOTE
Continue follow-up with oncology  Her Piqray dose was lowered from 300 mg to 150 mg-- has decreased hyperglycemia  Please notify endocrine of any changes as it will directly impact her blood sugar readings and her medication needs

## 2020-10-19 NOTE — PROGRESS NOTES
CC:   Chief Complaint   Patient presents with    Follow-up       HPI: Melissa Sood is a 75 y.o. female presents for a follow-up visit today for the management of T2DM.     She was diagnosed with Type 2 diabetes about 10 years ago on routine lab work. She was started on metformin at the time of diagnosis. She has only ever been on Metformin for diabetes management.   Her diabetes was well controlled on low dose Metformin until she started a new cancer treatment. She is being treated for recurrent breast cancer with metastasis with Piqray-- she could not tolerate the 300 mg dose (hyperglycemia, constipation, weight loss) and was lowered to the 150 mg daily dose a couple of weeks ago.   No radiation treatment.  We started insulin therapy in June 2020.      Family hx of diabetes: a couple of siblings - she is 1 out 13.   Hospitalized for diabetes: denies     No personal or FH of thyroid cancer or personal of pancreatic cancer or pancreatitis.     Patient has a history of dementia--she is pleasantly confused during our visit today--her family is taking care of her. She has multiple different family members that helps to take care of her.  Normally her daughter-in-law Suzanne comes to her visits-today she is accompanied by her sister Harriet  It will be difficult to use NovoLog or Humalog sliding scale as patient grazes throughout the day and does not have a consistent caregiver present to check her blood sugars before meals and administer sliding scale insulin.     Our last visit was July 2020.  At that visit we increased her metformin to a 1000 mg b.i.d. and continue the Lantus daily.  She wore a professional C GM at the end of August which showed an average blood sugar of 157 and estimated A1c of 7.1%.  She was in target range 69% of the time.  She did have prandial excursions-with dietary indiscretions.  I spoke with her daughter and review the results in early September.  It we discussed adding in additional agents to  help with prandial excursions.  Her daughter did not want to add in in the additional medication at that time due to patient's dementia and cancer  Her recent A1c has increased to 8%  Today she presents with her sister Harriet.       DIABETES COMPLICATIONS: none      Diabetes Management Status    ASA:  Yes - 81 mg daily     Statin: Taking  ACE/ARB: Not taking    Screening or Prevention Patient's value Goal Complete/Controlled?   HgA1C Testing and Control   Lab Results   Component Value Date    HGBA1C 8.0 (H) 10/09/2020      Annually/Less than 8% Yes   Lipid profile : 10/09/2020 Annually No   LDL control Lab Results   Component Value Date    LDLCALC 76 10/09/2020    Annually/Less than 100 mg/dl  No   Nephropathy screening Lab Results   Component Value Date    LABMICR 9.0 07/17/2020     Lab Results   Component Value Date    PROTEINUA Negative 09/08/2020    Annually Yes   Blood pressure BP Readings from Last 1 Encounters:   10/19/20 135/64    Less than 140/90 Yes   Dilated retinal exam : 07/13/2020- Annually Yes   Foot exam   : 06/24/2020 Annually No       CURRENT A1C:    Hemoglobin A1C   Date Value Ref Range Status   10/09/2020 8.0 (H) 4.0 - 5.6 % Final     Comment:     ADA Screening Guidelines:  5.7-6.4%  Consistent with prediabetes  >or=6.5%  Consistent with diabetes  High levels of fetal hemoglobin interfere with the HbA1C  assay. Heterozygous hemoglobin variants (HbS, HgC, etc)do  not significantly interfere with this assay.   However, presence of multiple variants may affect accuracy.     03/26/2020 5.9 4.5 - 6.2 % Final     Comment:     According to ADA guidelines, hemoglobin A1C <7.0% represents  optimal control in non-pregnant diabetic patients.  Different  metrics may apply to specific populations.   Standards of Medical Care in Diabetes - 2016.  For the purpose of screening for the presence of diabetes:  <5.7%     Consistent with the absence of diabetes  5.7-6.4%  Consistent with increasing risk for diabetes    (prediabetes)  >or=6.5%  Consistent with diabetes  Currently no consensus exists for use of hemoglobin A1C  for diagnosis of diabetes for children.     04/16/2019 5.9 (H) 4.0 - 5.6 % Final     Comment:     ADA Screening Guidelines:  5.7-6.4%  Consistent with prediabetes  >or=6.5%  Consistent with diabetes  High levels of fetal hemoglobin interfere with the HbA1C  assay. Heterozygous hemoglobin variants (HbS, HgC, etc)do  not significantly interfere with this assay.   However, presence of multiple variants may affect accuracy.         GOAL A1C: 7% without hypoglycemia      DM MEDICATIONS USED IN THE PAST: Metformin   Lantus     CURRENT DIABETES MEDICATIONS: Metformin 1000 mg BID and Lantus 10 units daily    Insulin: N/A   Missed doses: denies   Injecting the insulin into her arm.     BLOOD GLUCOSE MONITORING:  Checking 2 times per day   She presents with logs from home for review.   Some readings may be post prandial as she grazes throughout the day                       HYPOGLYCEMIA: denies       MEALS: Grazer   Working on diet.   Most of her eating is in the evening   Grazing throughout the day --cookies and chips   BF: cinnamon rolls   Lunch: burger from [employer]   Drinks: Crystal light tea   Eliminating regular soft for the most part.   coffee in the AM   Water   Juice -- OJ -- little bit each day        CURRENT EXERCISE:  No    Review of Systems  Review of Systems   Constitutional: Negative for appetite change, fatigue and unexpected weight change.   HENT: Negative for trouble swallowing.    Eyes: Negative for visual disturbance.   Respiratory: Negative for shortness of breath.    Cardiovascular: Negative for chest pain.   Gastrointestinal: Positive for diarrhea (intermittent ). Negative for nausea.   Endocrine: Negative for polydipsia, polyphagia and polyuria.   Genitourinary:        No Nocturia    Musculoskeletal: Positive for neck pain (improving -- occ ).   Skin: Negative for wound.   Neurological:  Negative for numbness.        Forgetful    Psychiatric/Behavioral: Positive for confusion.       Physical Exam   Physical Exam  Vitals signs and nursing note reviewed.   Constitutional:       Appearance: She is well-developed.   HENT:      Head: Normocephalic and atraumatic.      Right Ear: External ear normal.      Left Ear: External ear normal.      Nose: Nose normal.   Neck:      Musculoskeletal: Normal range of motion and neck supple.      Thyroid: No thyromegaly.      Trachea: No tracheal deviation.   Cardiovascular:      Rate and Rhythm: Normal rate and regular rhythm.      Heart sounds: No murmur.   Pulmonary:      Effort: Pulmonary effort is normal. No respiratory distress.      Breath sounds: Normal breath sounds.   Abdominal:      Palpations: Abdomen is soft.      Tenderness: There is no abdominal tenderness.      Hernia: No hernia is present.   Skin:     General: Skin is warm and dry.      Capillary Refill: Capillary refill takes less than 2 seconds.      Findings: No rash.   Neurological:      Mental Status: She is alert.      Cranial Nerves: No cranial nerve deficit.      Comments: Pleasantly confused--history of dementia   Psychiatric:         Behavior: Behavior normal.         Judgment: Judgment normal.         FOOT EXAMINATION:  Appropriate footwear     Lab Results   Component Value Date    TSH 1.354 01/30/2018         Type 2 diabetes mellitus with hyperglycemia, without long-term current use of insulin  Uncontrolled  A1c is above goal. BG readings on logs from home are variable as some are postprandial.  Patient's Alzheimer's disease negatively impacting the overall management of diabetes as she relies on her family members to give her her medications and her insulin injections   It will be very difficult for them to get a pre meal blood sugar reading--concerned that the NovoLog/Humalog sliding scale would be use then to treat a postprandial blood sugar reading which would increase the risk for  hypoglycemia.  Encouraged continued focus on diet to eliminate sugary beverages and cut back on carbohydrate intake.  Discussed this at length with patient's sister today.         -- Medication Changes:    Continue Lantus 10 units daily  Discontinue plain metformin  Start Janumet  mg tablets b.i.d.        -- Reviewed goals of therapy are to get the best control we can without hypoglycemia.  -- Reviewed patient's current insulin regimen. Clarified proper insulin dose and timing in relation to meals, etc. Insulin injection sites and proper rotation instructed.    -- Advised frequent self blood glucose monitoring.  Patient encouraged to document glucose results and bring them to every clinic visit. -ideally 2 times per day before meals.   -- Hypoglycemia precautions discussed. Instructed on precautions before driving.    -- Call for Bg repeatedly < 90 or > 180.   -- Close adherence to lifestyle changes recommended.   -- Periodic follow ups for eye evaluations, foot care and dental care suggested.          Hyperlipidemia  On statin per ADA recommendations  LDL goal < 100. LDL at goal. LFTs WNL. Continue statin.       Alzheimer's disease  May hinder overall diabetes management  Patient lives alone but has family members that care for her    Recurrent malignant neoplasm of right breast  Continue follow-up with oncology  Her Piqray dose was lowered from 300 mg to 150 mg-- has decreased hyperglycemia  Please notify endocrine of any changes as it will directly impact her blood sugar readings and her medication needs         Follow up in about 3 months (around 1/19/2021).   Labs prior    Orders Placed This Encounter   Procedures    Hemoglobin A1C     Standing Status:   Future     Standing Expiration Date:   12/18/2021    Basic Metabolic Panel     Standing Status:   Future     Standing Expiration Date:   4/19/2022       Recommendations were discussed with the patient in detail  The patient verbalized understanding and  agrees with the plan outlined as above.

## 2020-12-14 ENCOUNTER — OFFICE VISIT (OUTPATIENT)
Dept: HEMATOLOGY/ONCOLOGY | Facility: CLINIC | Age: 75
End: 2020-12-14
Payer: MEDICARE

## 2020-12-14 VITALS
RESPIRATION RATE: 18 BRPM | TEMPERATURE: 97 F | DIASTOLIC BLOOD PRESSURE: 62 MMHG | WEIGHT: 120 LBS | HEART RATE: 74 BPM | BODY MASS INDEX: 21.95 KG/M2 | SYSTOLIC BLOOD PRESSURE: 117 MMHG

## 2020-12-14 DIAGNOSIS — C50.911 RECURRENT MALIGNANT NEOPLASM OF RIGHT BREAST: Primary | Chronic | ICD-10-CM

## 2020-12-14 DIAGNOSIS — R91.8 LUNG NODULES: ICD-10-CM

## 2020-12-14 PROCEDURE — 1101F PT FALLS ASSESS-DOCD LE1/YR: CPT | Mod: S$GLB,,, | Performed by: INTERNAL MEDICINE

## 2020-12-14 PROCEDURE — 3288F FALL RISK ASSESSMENT DOCD: CPT | Mod: S$GLB,,, | Performed by: INTERNAL MEDICINE

## 2020-12-14 PROCEDURE — 1126F PR PAIN SEVERITY QUANTIFIED, NO PAIN PRESENT: ICD-10-PCS | Mod: S$GLB,,, | Performed by: INTERNAL MEDICINE

## 2020-12-14 PROCEDURE — 1159F PR MEDICATION LIST DOCUMENTED IN MEDICAL RECORD: ICD-10-PCS | Mod: S$GLB,,, | Performed by: INTERNAL MEDICINE

## 2020-12-14 PROCEDURE — 99214 PR OFFICE/OUTPT VISIT, EST, LEVL IV, 30-39 MIN: ICD-10-PCS | Mod: S$GLB,,, | Performed by: INTERNAL MEDICINE

## 2020-12-14 PROCEDURE — 99214 OFFICE O/P EST MOD 30 MIN: CPT | Mod: S$GLB,,, | Performed by: INTERNAL MEDICINE

## 2020-12-14 PROCEDURE — 1126F AMNT PAIN NOTED NONE PRSNT: CPT | Mod: S$GLB,,, | Performed by: INTERNAL MEDICINE

## 2020-12-14 PROCEDURE — 1101F PR PT FALLS ASSESS DOC 0-1 FALLS W/OUT INJ PAST YR: ICD-10-PCS | Mod: S$GLB,,, | Performed by: INTERNAL MEDICINE

## 2020-12-14 PROCEDURE — 1159F MED LIST DOCD IN RCRD: CPT | Mod: S$GLB,,, | Performed by: INTERNAL MEDICINE

## 2020-12-14 PROCEDURE — 3288F PR FALLS RISK ASSESSMENT DOCUMENTED: ICD-10-PCS | Mod: S$GLB,,, | Performed by: INTERNAL MEDICINE

## 2020-12-14 NOTE — PROGRESS NOTES
PROGRESS NOTE    Subjective:       Patient ID: Melissa Sood is a 75 y.o. female.    Breast Cancer:  Dx: 2003  T2N1M0 stage II  neoadj AC-T, surgery-XRT Arimidex-Femara till 2/2011  BRCA 1/2 neg  ER pos, Her 2 neg  Recurrence 3/2018  Started Ibrance/Exemestane 4/30/2018-5/1/2020.  Piqray 5/15/2020- 6/7/2020(held due to high glu)    Chief Complaint:  No chief complaint on file.  breast cancer follow up.      History of Present Illness:   Melissa Sood is a 75 y.o. female who presents for follow up.      Patient continues to complain about her right neck hurting.  Son states she complains daily about this.      CTA chest 9/8/2020 for PE negative.   RUE DVT Dx 9/9/2020.  Started on Eliquis 5mg bid.     Patient now on 150mg of piqray and continues as of today, 12/14/2020 and is tolerating this better.     Need tumor markers!! CTA with ? New pulm noduels 9/2020    PIK3CA--path 4/2/2018    PET impression 8/10/2020:  1. Interval improvement with decreased FDG hypermetabolism associated with right supraclavicular lymph node, and resolution of previous hypermetabolic superior mediastinal lymph node.  2. No new findings of active metastatic disease.  3. Stable diffuse sclerotic osseous metastases, non hypermetabolic and consistent with treated or quiescent disease.  4. Resolution of previous left lower lobe nodular opacities, with no new suspicious pulmonary nodules.    Labs 6/24/2019:  WBC 2.5(ANC 1.3)  Hb 11.6  Plt: 110--228  CMP unremark  CA 15-3:  42.9--44--55  CA 27.29: 62.5--68--69      PET scan impression 8/10/2020:  1. Interval improvement with decreased FDG hypermetabolism associated with right supraclavicular lymph node, and resolution of previous hypermetabolic superior mediastinal lymph node.  2. No new findings of active metastatic disease.  3. Stable diffuse sclerotic osseous metastases, non hypermetabolic and consistent with treated or quiescent  disease.  4. Resolution of previous left lower lobe nodular opacities, with no new suspicious pulmonary nodules    Family and Social history reviewed and is unchanged from 9/22/2014      ROS:  Review of Systems   Constitutional: Negative for appetite change, fever and unexpected weight change.   Eyes: Negative for visual disturbance.   Respiratory: Negative for cough and shortness of breath.    Cardiovascular: Negative for chest pain and leg swelling.   Gastrointestinal: Positive for diarrhea. Negative for abdominal pain and blood in stool.   Genitourinary: Negative for frequency and hematuria.   Musculoskeletal: Negative for back pain.   Skin: Negative for rash.   Neurological: Negative for headaches.   Hematological: Negative for adenopathy.   Psychiatric/Behavioral: The patient is not nervous/anxious.           Current Outpatient Medications:     alpelisib (PIQRAY) 300 mg/day (150 mg x 2) Tab, Take 300 mg by mouth once daily., Disp: 60 tablet, Rfl: 6    ALPRAZolam (XANAX) 0.25 MG tablet, TAKE 1 TABLET BY MOUTH THREE TIMES DAILY AS NEEDED FOR ANXIETY, Disp: 30 tablet, Rfl: 0    atorvastatin (LIPITOR) 20 MG tablet, TAKE 1 TABLET BY MOUTH ONCE DAILY, Disp: 90 tablet, Rfl: 1    blood sugar diagnostic Strp, 1 strip by Misc.(Non-Drug; Combo Route) route 4 (four) times daily with meals and nightly., Disp: 150 each, Rfl: 11    blood-glucose meter (FREESTYLE SYSTEM KIT) kit, Use as instructed, Disp: 1 each, Rfl: 0    docusate sodium (COLACE) 100 MG capsule, Take 1 capsule (100 mg total) by mouth 2 (two) times daily., Disp: 60 capsule, Rfl: 0    donepezil (ARICEPT) 10 MG tablet, Take 1 tablet by mouth every evening., Disp: , Rfl:     escitalopram oxalate (LEXAPRO) 20 MG tablet, TAKE 1 TABLET BY MOUTH EVERY DAY, Disp: 90 tablet, Rfl: 0    exemestane (AROMASIN) 25 mg tablet, TAKE 1 TABLET BY MOUTH EVERY DAY, Disp: 30 tablet, Rfl: 11    HYDROcodone-acetaminophen (NORCO) 5-325 mg per tablet, Take 1 tablet by mouth  "every 6 (six) hours as needed for Pain., Disp: 60 tablet, Rfl: 0    insulin (LANTUS SOLOSTAR U-100 INSULIN) glargine 100 units/mL (3mL) SubQ pen, Inject 10 Units into the skin every evening. Titrate up to FBG <150. Max TDD of 50 units, Disp: 1 Box, Rfl: 3    lancets (FREESTYLE LANCETS) 28 gauge Misc, 1 lancet by Misc.(Non-Drug; Combo Route) route 4 (four) times daily with meals and nightly., Disp: 150 each, Rfl: 11    memantine (NAMENDA XR) 28 mg CSpX, memantine 28 mg capsule sprinkle,extended release 24hr  TAKE 1 CAPSULE BY MOUTH EVERY DAY, Disp: , Rfl:     ondansetron (ZOFRAN) 8 MG tablet, Take 1 tablet (8 mg total) by mouth every 8 (eight) hours as needed for Nausea., Disp: 30 tablet, Rfl: 5    pen needle, diabetic (BD ULTRA-FINE SHARA PEN NEEDLE) 32 gauge x 5/32" Ndle, To use daily with insulin injection, Disp: 100 each, Rfl: 3    polyethylene glycol (GLYCOLAX) 17 gram/dose powder, Take 17 g by mouth once daily., Disp: 238 g, Rfl: 0    sertraline (ZOLOFT) 50 MG tablet, sertraline 50 mg tablet  TK 1 T PO QD FOR 2 WKS THEN TK 1 T PO BID, Disp: , Rfl:     SITagliptan-metformin (JANUMET) 50-1,000 mg per tablet, Take 1 tablet by mouth 2 (two) times daily with meals., Disp: 180 tablet, Rfl: 1        Objective:       Physical Examination:     /62   Pulse 74   Temp 97 °F (36.1 °C)   Resp 18   Wt 54.4 kg (120 lb)   BMI 21.95 kg/m²     Physical Exam  Constitutional:       Appearance: She is well-developed.   HENT:      Head: Normocephalic and atraumatic.      Right Ear: External ear normal.      Left Ear: External ear normal.   Eyes:      Conjunctiva/sclera: Conjunctivae normal.      Pupils: Pupils are equal, round, and reactive to light.   Neck:      Thyroid: No thyromegaly.      Trachea: No tracheal deviation.   Cardiovascular:      Rate and Rhythm: Normal rate and regular rhythm.      Heart sounds: Normal heart sounds.   Pulmonary:      Effort: Pulmonary effort is normal.      Breath sounds: Normal " breath sounds.   Abdominal:      General: Bowel sounds are normal. There is no distension.      Palpations: Abdomen is soft. There is no mass.      Tenderness: There is no abdominal tenderness.   Skin:     Findings: No rash.   Neurological:      Comments: Neuro intact througout   Psychiatric:         Behavior: Behavior normal.         Thought Content: Thought content normal.         Judgment: Judgment normal.         Labs:   Recent Results (from the past 336 hour(s))   CBC auto differential    Collection Time: 12/11/20  2:29 PM   Result Value Ref Range    WBC 6.10 3.90 - 12.70 K/uL    Hemoglobin 12.9 12.0 - 16.0 g/dL    Hematocrit 39.3 37.0 - 48.5 %    Platelets 217 150 - 350 K/uL     CMP  Sodium   Date Value Ref Range Status   12/11/2020 138 136 - 145 mmol/L Final   06/24/2019 141 134 - 144 mmol/L      Potassium   Date Value Ref Range Status   12/11/2020 4.1 3.5 - 5.1 mmol/L Final     Chloride   Date Value Ref Range Status   12/11/2020 99 (L) 101 - 111 mmol/L Final   06/24/2019 103 98 - 110 mmol/L      CO2   Date Value Ref Range Status   12/11/2020 28 23 - 29 mmol/L Final     Glucose   Date Value Ref Range Status   12/11/2020 210 (H) 74 - 118 mg/dL Final   06/24/2019 87 70 - 99 mg/dL      BUN   Date Value Ref Range Status   12/11/2020 17 8 - 23 mg/dL Final     Creatinine   Date Value Ref Range Status   12/11/2020 0.8 0.5 - 1.4 mg/dL Final   06/24/2019 0.82 0.60 - 1.40 mg/dL      Calcium   Date Value Ref Range Status   12/11/2020 9.1 8.6 - 10.0 mg/dL Final     Total Protein   Date Value Ref Range Status   12/11/2020 7.3 6.0 - 8.4 g/dL Final     Albumin   Date Value Ref Range Status   12/11/2020 4.4 3.5 - 5.2 g/dL Final   06/24/2019 4.3 3.1 - 4.7 g/dL      Total Bilirubin   Date Value Ref Range Status   12/11/2020 0.3 0.3 - 1.2 mg/dL Final     Comment:     For infants and newborns, interpretation of results should be based  on gestational age, weight and in agreement with clinical  observations.  Premature Infant  recommended reference ranges:  Up to 24 hours.............<8.0 mg/dL  Up to 48 hours............<12.0 mg/dL  3-5 days..................<15.0 mg/dL  6-29 days.................<15.0 mg/dL       Alkaline Phosphatase   Date Value Ref Range Status   12/11/2020 51 38 - 126 U/L Final     AST   Date Value Ref Range Status   12/11/2020 20 15 - 41 U/L Final     ALT   Date Value Ref Range Status   12/11/2020 17 14 - 54 U/L Final     Anion Gap   Date Value Ref Range Status   12/11/2020 11 8 - 16 mmol/L Final     eGFR if    Date Value Ref Range Status   12/11/2020 >60.0 >60 mL/min/1.73 m^2 Final     eGFR if non    Date Value Ref Range Status   12/11/2020 >60.0 >60 mL/min/1.73 m^2 Final     Comment:     Calculation used to obtain the estimated glomerular filtration  rate (eGFR) is the CKD-EPI equation.        Lab Results   Component Value Date    CEA 4.2 12/11/2020     No results found for: PSA        Assessment/Plan:     Problem List Items Addressed This Visit     Recurrent malignant neoplasm of right breast - Primary (Chronic)     Patient seems to be doing ok but I do need tumor markers noted below.  She is tolerating the therapy fairly well now and will continue as long as it is effective and tolerated.      She does have increasing neck pain and I discussed this with her.  Will have a CT scan done of the neck to properly evaluate this problem.           Relevant Orders    CT Neck Chest With Contrast (XPD)    CT Chest With Contrast    Cancer Antigen 15-3    Cancer Antigen 27-29    Creatinine, serum    Lung nodules     Will get scanning of the chest to continue to evaluate this problem.  ? Malignancy but hard to be sure in this regard.           Relevant Orders    CT Neck Chest With Contrast (XPD)    CT Chest With Contrast    Cancer Antigen 15-3    Cancer Antigen 27-29    Creatinine, serum          Discussion:   High complexity, high risk medications.   Follow up in about 2 months (around  2/14/2021).      Electronically signed by Rudolph Bryan

## 2020-12-15 ENCOUNTER — TELEPHONE (OUTPATIENT)
Dept: HEMATOLOGY/ONCOLOGY | Facility: CLINIC | Age: 75
End: 2020-12-15

## 2020-12-15 NOTE — TELEPHONE ENCOUNTER
----- Message from Rudolph Barton MD sent at 12/15/2020  1:04 PM CST -----  Please call the patient regarding her abnormal result. Tumor markers look ok.  Still trending down.        Spoke to Melissa and let her know that the CEA is trending down. Dr. Barton says the results are good.

## 2020-12-16 PROBLEM — R91.8 LUNG NODULES: Status: ACTIVE | Noted: 2020-12-16

## 2020-12-16 NOTE — ASSESSMENT & PLAN NOTE
Patient seems to be doing ok but I do need tumor markers noted below.  She is tolerating the therapy fairly well now and will continue as long as it is effective and tolerated.      She does have increasing neck pain and I discussed this with her.  Will have a CT scan done of the neck to properly evaluate this problem.

## 2020-12-16 NOTE — ASSESSMENT & PLAN NOTE
Will get scanning of the chest to continue to evaluate this problem.  ? Malignancy but hard to be sure in this regard.

## 2020-12-30 ENCOUNTER — HOSPITAL ENCOUNTER (OUTPATIENT)
Dept: RADIOLOGY | Facility: HOSPITAL | Age: 75
Discharge: HOME | End: 2020-12-30
Attending: INTERNAL MEDICINE
Payer: MEDICARE

## 2020-12-30 DIAGNOSIS — C50.911 RECURRENT MALIGNANT NEOPLASM OF RIGHT BREAST: Chronic | ICD-10-CM

## 2020-12-30 DIAGNOSIS — R91.8 LUNG NODULES: ICD-10-CM

## 2020-12-30 PROCEDURE — 70491 CT SOFT TISSUE NECK W/DYE: CPT | Mod: TC,PO

## 2020-12-30 PROCEDURE — 25500020 PHARM REV CODE 255: Mod: PO | Performed by: INTERNAL MEDICINE

## 2020-12-30 RX ADMIN — IOHEXOL 100 ML: 350 INJECTION, SOLUTION INTRAVENOUS at 09:12

## 2021-01-01 ENCOUNTER — TREATMENT (OUTPATIENT)
Dept: RADIATION ONCOLOGY | Facility: CLINIC | Age: 76
End: 2021-01-01
Payer: MEDICARE

## 2021-01-01 ENCOUNTER — TELEPHONE (OUTPATIENT)
Dept: DIABETES | Facility: CLINIC | Age: 76
End: 2021-01-01

## 2021-01-01 ENCOUNTER — TELEPHONE (OUTPATIENT)
Dept: PRIMARY CARE CLINIC | Facility: CLINIC | Age: 76
End: 2021-01-01

## 2021-01-01 ENCOUNTER — LAB VISIT (OUTPATIENT)
Dept: LAB | Facility: HOSPITAL | Age: 76
End: 2021-01-01
Attending: FAMILY MEDICINE
Payer: MEDICARE

## 2021-01-01 ENCOUNTER — CLINICAL SUPPORT (OUTPATIENT)
Dept: RADIATION ONCOLOGY | Facility: CLINIC | Age: 76
End: 2021-01-01
Payer: MEDICARE

## 2021-01-01 ENCOUNTER — PATIENT OUTREACH (OUTPATIENT)
Dept: ADMINISTRATIVE | Facility: OTHER | Age: 76
End: 2021-01-01
Payer: MEDICARE

## 2021-01-01 ENCOUNTER — OFFICE VISIT (OUTPATIENT)
Dept: DIABETES | Facility: CLINIC | Age: 76
End: 2021-01-01
Payer: MEDICARE

## 2021-01-01 ENCOUNTER — OFFICE VISIT (OUTPATIENT)
Dept: PRIMARY CARE CLINIC | Facility: CLINIC | Age: 76
End: 2021-01-01
Payer: MEDICARE

## 2021-01-01 VITALS
RESPIRATION RATE: 18 BRPM | DIASTOLIC BLOOD PRESSURE: 60 MMHG | SYSTOLIC BLOOD PRESSURE: 110 MMHG | HEIGHT: 62 IN | BODY MASS INDEX: 21.91 KG/M2 | HEART RATE: 73 BPM | WEIGHT: 119.06 LBS | OXYGEN SATURATION: 98 %

## 2021-01-01 VITALS
HEART RATE: 71 BPM | SYSTOLIC BLOOD PRESSURE: 102 MMHG | BODY MASS INDEX: 21.9 KG/M2 | TEMPERATURE: 98 F | WEIGHT: 119 LBS | DIASTOLIC BLOOD PRESSURE: 60 MMHG | OXYGEN SATURATION: 99 % | HEIGHT: 62 IN

## 2021-01-01 DIAGNOSIS — E11.65 TYPE 2 DIABETES MELLITUS WITH HYPERGLYCEMIA, WITHOUT LONG-TERM CURRENT USE OF INSULIN: ICD-10-CM

## 2021-01-01 DIAGNOSIS — C50.911 RECURRENT MALIGNANT NEOPLASM OF RIGHT BREAST: Chronic | ICD-10-CM

## 2021-01-01 DIAGNOSIS — E78.5 TYPE 2 DIABETES MELLITUS WITH HYPERLIPIDEMIA: Primary | ICD-10-CM

## 2021-01-01 DIAGNOSIS — E11.69 TYPE 2 DIABETES MELLITUS WITH HYPERLIPIDEMIA: Primary | ICD-10-CM

## 2021-01-01 DIAGNOSIS — G30.1 LATE ONSET ALZHEIMER'S DISEASE WITHOUT BEHAVIORAL DISTURBANCE: ICD-10-CM

## 2021-01-01 DIAGNOSIS — R30.0 DYSURIA: ICD-10-CM

## 2021-01-01 DIAGNOSIS — E78.2 MIXED HYPERLIPIDEMIA: ICD-10-CM

## 2021-01-01 DIAGNOSIS — C50.911 RECURRENT MALIGNANT NEOPLASM OF RIGHT BREAST: Primary | Chronic | ICD-10-CM

## 2021-01-01 DIAGNOSIS — E11.9 TYPE 2 DIABETES MELLITUS WITHOUT COMPLICATION, WITHOUT LONG-TERM CURRENT USE OF INSULIN: ICD-10-CM

## 2021-01-01 DIAGNOSIS — F02.80 LATE ONSET ALZHEIMER'S DISEASE WITHOUT BEHAVIORAL DISTURBANCE: ICD-10-CM

## 2021-01-01 DIAGNOSIS — E11.65 TYPE 2 DIABETES MELLITUS WITH HYPERGLYCEMIA, WITHOUT LONG-TERM CURRENT USE OF INSULIN: Primary | ICD-10-CM

## 2021-01-01 DIAGNOSIS — I82.621 ACUTE DEEP VEIN THROMBOSIS (DVT) OF RIGHT UPPER EXTREMITY, UNSPECIFIED VEIN: ICD-10-CM

## 2021-01-01 DIAGNOSIS — Z66 DNR (DO NOT RESUSCITATE): ICD-10-CM

## 2021-01-01 LAB
BACTERIA #/AREA URNS AUTO: NORMAL /HPF
BILIRUB UR QL STRIP: NEGATIVE
CLARITY UR REFRACT.AUTO: ABNORMAL
COLOR UR AUTO: YELLOW
GLUCOSE UR QL STRIP: NEGATIVE
HGB UR QL STRIP: NEGATIVE
KETONES UR QL STRIP: NEGATIVE
LEUKOCYTE ESTERASE UR QL STRIP: ABNORMAL
MICROSCOPIC COMMENT: NORMAL
NITRITE UR QL STRIP: POSITIVE
PH UR STRIP: 5 [PH] (ref 5–8)
PROT UR QL STRIP: NEGATIVE
RBC #/AREA URNS AUTO: 1 /HPF (ref 0–4)
SP GR UR STRIP: 1.01 (ref 1–1.03)
SQUAMOUS #/AREA URNS AUTO: 0 /HPF
URN SPEC COLLECT METH UR: ABNORMAL
WBC #/AREA URNS AUTO: 4 /HPF (ref 0–5)

## 2021-01-01 PROCEDURE — 77014 PR  CT GUIDANCE PLACEMENT RAD THERAPY FIELDS: ICD-10-PCS | Mod: S$GLB,,, | Performed by: RADIOLOGY

## 2021-01-01 PROCEDURE — 99213 OFFICE O/P EST LOW 20 MIN: CPT | Mod: S$GLB,,, | Performed by: FAMILY MEDICINE

## 2021-01-01 PROCEDURE — 77014 PR  CT GUIDANCE PLACEMENT RAD THERAPY FIELDS: CPT | Mod: S$GLB,,, | Performed by: RADIOLOGY

## 2021-01-01 PROCEDURE — 81001 URINALYSIS AUTO W/SCOPE: CPT | Performed by: FAMILY MEDICINE

## 2021-01-01 PROCEDURE — 99213 PR OFFICE/OUTPT VISIT, EST, LEVL III, 20-29 MIN: ICD-10-PCS | Mod: S$GLB,,, | Performed by: FAMILY MEDICINE

## 2021-01-01 PROCEDURE — G6015 RADIATION TX DELIVERY IMRT: HCPCS | Mod: S$GLB,,, | Performed by: RADIOLOGY

## 2021-01-01 PROCEDURE — G6015 PR RADN TX DELIVERY,  INTENS MOD, 1+ FIELDS PER TX: ICD-10-PCS | Mod: S$GLB,,, | Performed by: RADIOLOGY

## 2021-01-01 PROCEDURE — 99999 PR PBB SHADOW E&M-EST. PATIENT-LVL IV: ICD-10-PCS | Mod: PBBFAC,,, | Performed by: NURSE PRACTITIONER

## 2021-01-01 PROCEDURE — 99999 PR PBB SHADOW E&M-EST. PATIENT-LVL III: CPT | Mod: PBBFAC,,, | Performed by: FAMILY MEDICINE

## 2021-01-01 PROCEDURE — 77427 RADIATION TX MANAGEMENT X5: CPT | Mod: S$GLB,,, | Performed by: RADIOLOGY

## 2021-01-01 PROCEDURE — 77336 PR  RADN PHYSICS CONSULT CONTINUING: ICD-10-PCS | Mod: S$GLB,,, | Performed by: RADIOLOGY

## 2021-01-01 PROCEDURE — 99999 PR PBB SHADOW E&M-EST. PATIENT-LVL IV: CPT | Mod: PBBFAC,,, | Performed by: NURSE PRACTITIONER

## 2021-01-01 PROCEDURE — 99213 OFFICE O/P EST LOW 20 MIN: CPT | Mod: S$GLB,,, | Performed by: NURSE PRACTITIONER

## 2021-01-01 PROCEDURE — 99999 PR PBB SHADOW E&M-EST. PATIENT-LVL III: ICD-10-PCS | Mod: PBBFAC,,, | Performed by: FAMILY MEDICINE

## 2021-01-01 PROCEDURE — 77336 RADIATION PHYSICS CONSULT: CPT | Mod: S$GLB,,, | Performed by: RADIOLOGY

## 2021-01-01 PROCEDURE — 99213 PR OFFICE/OUTPT VISIT, EST, LEVL III, 20-29 MIN: ICD-10-PCS | Mod: S$GLB,,, | Performed by: NURSE PRACTITIONER

## 2021-01-01 PROCEDURE — 77427 PR CHG RADIATION,MANGEMENT,5 TX'S: ICD-10-PCS | Mod: S$GLB,,, | Performed by: RADIOLOGY

## 2021-01-01 RX ORDER — SULFAMETHOXAZOLE AND TRIMETHOPRIM 800; 160 MG/1; MG/1
1 TABLET ORAL 2 TIMES DAILY
Qty: 14 TABLET | Refills: 0 | Status: SHIPPED | OUTPATIENT
Start: 2021-01-01 | End: 2021-01-01

## 2021-01-01 RX ORDER — APIXABAN 5 MG/1
5 TABLET, FILM COATED ORAL 2 TIMES DAILY
Qty: 60 TABLET | Refills: 5 | Status: SHIPPED | OUTPATIENT
Start: 2021-01-01 | End: 2022-01-01 | Stop reason: SDUPTHER

## 2021-01-01 RX ORDER — ATORVASTATIN CALCIUM 20 MG/1
20 TABLET, FILM COATED ORAL DAILY
Qty: 90 TABLET | Refills: 1 | Status: SHIPPED | OUTPATIENT
Start: 2021-01-01

## 2021-01-01 RX ORDER — HYDROCODONE BITARTRATE AND ACETAMINOPHEN 5; 325 MG/1; MG/1
1 TABLET ORAL EVERY 6 HOURS PRN
Qty: 60 TABLET | Refills: 0 | Status: SHIPPED | OUTPATIENT
Start: 2021-01-01 | End: 2021-01-01

## 2021-01-19 ENCOUNTER — IMMUNIZATION (OUTPATIENT)
Dept: PRIMARY CARE CLINIC | Facility: CLINIC | Age: 76
End: 2021-01-19
Payer: MEDICARE

## 2021-01-19 DIAGNOSIS — Z23 NEED FOR VACCINATION: Primary | ICD-10-CM

## 2021-01-19 PROCEDURE — 91300 COVID-19, MRNA, LNP-S, PF, 30 MCG/0.3 ML DOSE VACCINE: CPT | Mod: PBBFAC | Performed by: EMERGENCY MEDICINE

## 2021-02-01 ENCOUNTER — OFFICE VISIT (OUTPATIENT)
Dept: HEMATOLOGY/ONCOLOGY | Facility: CLINIC | Age: 76
End: 2021-02-01
Payer: MEDICARE

## 2021-02-01 VITALS
SYSTOLIC BLOOD PRESSURE: 127 MMHG | WEIGHT: 122 LBS | DIASTOLIC BLOOD PRESSURE: 67 MMHG | BODY MASS INDEX: 22.31 KG/M2 | HEART RATE: 66 BPM | RESPIRATION RATE: 18 BRPM

## 2021-02-01 DIAGNOSIS — C50.911 RECURRENT MALIGNANT NEOPLASM OF RIGHT BREAST: Chronic | ICD-10-CM

## 2021-02-01 DIAGNOSIS — E78.5 TYPE 2 DIABETES MELLITUS WITH HYPERLIPIDEMIA: ICD-10-CM

## 2021-02-01 DIAGNOSIS — F02.80 ALZHEIMER'S DEMENTIA WITHOUT BEHAVIORAL DISTURBANCE, UNSPECIFIED TIMING OF DEMENTIA ONSET: ICD-10-CM

## 2021-02-01 DIAGNOSIS — I82.621 ACUTE DEEP VEIN THROMBOSIS (DVT) OF RIGHT UPPER EXTREMITY, UNSPECIFIED VEIN: ICD-10-CM

## 2021-02-01 DIAGNOSIS — G30.9 ALZHEIMER'S DEMENTIA WITHOUT BEHAVIORAL DISTURBANCE, UNSPECIFIED TIMING OF DEMENTIA ONSET: ICD-10-CM

## 2021-02-01 DIAGNOSIS — E11.69 TYPE 2 DIABETES MELLITUS WITH HYPERLIPIDEMIA: ICD-10-CM

## 2021-02-01 PROCEDURE — 3051F HG A1C>EQUAL 7.0%<8.0%: CPT | Mod: S$GLB,,, | Performed by: INTERNAL MEDICINE

## 2021-02-01 PROCEDURE — 1159F PR MEDICATION LIST DOCUMENTED IN MEDICAL RECORD: ICD-10-PCS | Mod: S$GLB,,, | Performed by: INTERNAL MEDICINE

## 2021-02-01 PROCEDURE — 3078F PR MOST RECENT DIASTOLIC BLOOD PRESSURE < 80 MM HG: ICD-10-PCS | Mod: S$GLB,,, | Performed by: INTERNAL MEDICINE

## 2021-02-01 PROCEDURE — 3051F PR MOST RECENT HEMOGLOBIN A1C LEVEL 7.0 - < 8.0%: ICD-10-PCS | Mod: S$GLB,,, | Performed by: INTERNAL MEDICINE

## 2021-02-01 PROCEDURE — 1126F PR PAIN SEVERITY QUANTIFIED, NO PAIN PRESENT: ICD-10-PCS | Mod: S$GLB,,, | Performed by: INTERNAL MEDICINE

## 2021-02-01 PROCEDURE — 99214 PR OFFICE/OUTPT VISIT, EST, LEVL IV, 30-39 MIN: ICD-10-PCS | Mod: S$GLB,,, | Performed by: INTERNAL MEDICINE

## 2021-02-01 PROCEDURE — 3074F SYST BP LT 130 MM HG: CPT | Mod: S$GLB,,, | Performed by: INTERNAL MEDICINE

## 2021-02-01 PROCEDURE — 1159F MED LIST DOCD IN RCRD: CPT | Mod: S$GLB,,, | Performed by: INTERNAL MEDICINE

## 2021-02-01 PROCEDURE — 3078F DIAST BP <80 MM HG: CPT | Mod: S$GLB,,, | Performed by: INTERNAL MEDICINE

## 2021-02-01 PROCEDURE — 3288F FALL RISK ASSESSMENT DOCD: CPT | Mod: S$GLB,,, | Performed by: INTERNAL MEDICINE

## 2021-02-01 PROCEDURE — 99214 OFFICE O/P EST MOD 30 MIN: CPT | Mod: S$GLB,,, | Performed by: INTERNAL MEDICINE

## 2021-02-01 PROCEDURE — 1126F AMNT PAIN NOTED NONE PRSNT: CPT | Mod: S$GLB,,, | Performed by: INTERNAL MEDICINE

## 2021-02-01 PROCEDURE — 1101F PT FALLS ASSESS-DOCD LE1/YR: CPT | Mod: S$GLB,,, | Performed by: INTERNAL MEDICINE

## 2021-02-01 PROCEDURE — 3074F PR MOST RECENT SYSTOLIC BLOOD PRESSURE < 130 MM HG: ICD-10-PCS | Mod: S$GLB,,, | Performed by: INTERNAL MEDICINE

## 2021-02-01 PROCEDURE — 1101F PR PT FALLS ASSESS DOC 0-1 FALLS W/OUT INJ PAST YR: ICD-10-PCS | Mod: S$GLB,,, | Performed by: INTERNAL MEDICINE

## 2021-02-01 PROCEDURE — 3288F PR FALLS RISK ASSESSMENT DOCUMENTED: ICD-10-PCS | Mod: S$GLB,,, | Performed by: INTERNAL MEDICINE

## 2021-02-10 ENCOUNTER — IMMUNIZATION (OUTPATIENT)
Dept: PRIMARY CARE CLINIC | Facility: CLINIC | Age: 76
End: 2021-02-10
Payer: MEDICARE

## 2021-02-10 DIAGNOSIS — Z23 NEED FOR VACCINATION: Primary | ICD-10-CM

## 2021-02-10 PROCEDURE — 0002A COVID-19, MRNA, LNP-S, PF, 30 MCG/0.3 ML DOSE VACCINE: CPT | Mod: PBBFAC | Performed by: EMERGENCY MEDICINE

## 2021-02-10 PROCEDURE — 91300 COVID-19, MRNA, LNP-S, PF, 30 MCG/0.3 ML DOSE VACCINE: CPT | Mod: PBBFAC | Performed by: EMERGENCY MEDICINE

## 2021-02-12 DIAGNOSIS — I82.621 ACUTE DEEP VEIN THROMBOSIS (DVT) OF RIGHT UPPER EXTREMITY, UNSPECIFIED VEIN: Primary | ICD-10-CM

## 2021-02-12 RX ORDER — APIXABAN 5 MG/1
5 TABLET, FILM COATED ORAL 2 TIMES DAILY
Qty: 60 TABLET | Refills: 1 | Status: SHIPPED | OUTPATIENT
Start: 2021-02-12 | End: 2021-04-30 | Stop reason: SDUPTHER

## 2021-02-12 RX ORDER — APIXABAN 5 MG/1
5 TABLET, FILM COATED ORAL 2 TIMES DAILY
COMMUNITY
Start: 2020-12-29 | End: 2021-02-12 | Stop reason: SDUPTHER

## 2021-03-23 ENCOUNTER — OFFICE VISIT (OUTPATIENT)
Dept: PRIMARY CARE CLINIC | Facility: CLINIC | Age: 76
End: 2021-03-23
Payer: MEDICARE

## 2021-03-23 VITALS
BODY MASS INDEX: 22.58 KG/M2 | RESPIRATION RATE: 18 BRPM | WEIGHT: 122.69 LBS | OXYGEN SATURATION: 97 % | DIASTOLIC BLOOD PRESSURE: 62 MMHG | HEIGHT: 62 IN | HEART RATE: 71 BPM | TEMPERATURE: 97 F | SYSTOLIC BLOOD PRESSURE: 130 MMHG

## 2021-03-23 DIAGNOSIS — R21 RASH: Primary | ICD-10-CM

## 2021-03-23 PROCEDURE — 99213 OFFICE O/P EST LOW 20 MIN: CPT | Mod: S$GLB,,, | Performed by: FAMILY MEDICINE

## 2021-03-23 PROCEDURE — 1126F AMNT PAIN NOTED NONE PRSNT: CPT | Mod: S$GLB,,, | Performed by: FAMILY MEDICINE

## 2021-03-23 PROCEDURE — 1101F PR PT FALLS ASSESS DOC 0-1 FALLS W/OUT INJ PAST YR: ICD-10-PCS | Mod: CPTII,S$GLB,, | Performed by: FAMILY MEDICINE

## 2021-03-23 PROCEDURE — 99999 PR PBB SHADOW E&M-EST. PATIENT-LVL IV: ICD-10-PCS | Mod: PBBFAC,,, | Performed by: FAMILY MEDICINE

## 2021-03-23 PROCEDURE — 1159F MED LIST DOCD IN RCRD: CPT | Mod: S$GLB,,, | Performed by: FAMILY MEDICINE

## 2021-03-23 PROCEDURE — 3078F PR MOST RECENT DIASTOLIC BLOOD PRESSURE < 80 MM HG: ICD-10-PCS | Mod: CPTII,S$GLB,, | Performed by: FAMILY MEDICINE

## 2021-03-23 PROCEDURE — 99213 PR OFFICE/OUTPT VISIT, EST, LEVL III, 20-29 MIN: ICD-10-PCS | Mod: S$GLB,,, | Performed by: FAMILY MEDICINE

## 2021-03-23 PROCEDURE — 3075F PR MOST RECENT SYSTOLIC BLOOD PRESS GE 130-139MM HG: ICD-10-PCS | Mod: CPTII,S$GLB,, | Performed by: FAMILY MEDICINE

## 2021-03-23 PROCEDURE — 3288F PR FALLS RISK ASSESSMENT DOCUMENTED: ICD-10-PCS | Mod: CPTII,S$GLB,, | Performed by: FAMILY MEDICINE

## 2021-03-23 PROCEDURE — 1159F PR MEDICATION LIST DOCUMENTED IN MEDICAL RECORD: ICD-10-PCS | Mod: S$GLB,,, | Performed by: FAMILY MEDICINE

## 2021-03-23 PROCEDURE — 3075F SYST BP GE 130 - 139MM HG: CPT | Mod: CPTII,S$GLB,, | Performed by: FAMILY MEDICINE

## 2021-03-23 PROCEDURE — 1101F PT FALLS ASSESS-DOCD LE1/YR: CPT | Mod: CPTII,S$GLB,, | Performed by: FAMILY MEDICINE

## 2021-03-23 PROCEDURE — 3288F FALL RISK ASSESSMENT DOCD: CPT | Mod: CPTII,S$GLB,, | Performed by: FAMILY MEDICINE

## 2021-03-23 PROCEDURE — 99999 PR PBB SHADOW E&M-EST. PATIENT-LVL IV: CPT | Mod: PBBFAC,,, | Performed by: FAMILY MEDICINE

## 2021-03-23 PROCEDURE — 3078F DIAST BP <80 MM HG: CPT | Mod: CPTII,S$GLB,, | Performed by: FAMILY MEDICINE

## 2021-03-23 PROCEDURE — 1126F PR PAIN SEVERITY QUANTIFIED, NO PAIN PRESENT: ICD-10-PCS | Mod: S$GLB,,, | Performed by: FAMILY MEDICINE

## 2021-03-23 RX ORDER — TRIAMCINOLONE ACETONIDE 1 MG/G
CREAM TOPICAL 2 TIMES DAILY
Qty: 45 G | Refills: 1 | Status: ON HOLD | OUTPATIENT
Start: 2021-03-23 | End: 2021-07-06 | Stop reason: HOSPADM

## 2021-03-31 ENCOUNTER — TELEPHONE (OUTPATIENT)
Dept: HEMATOLOGY/ONCOLOGY | Facility: CLINIC | Age: 76
End: 2021-03-31

## 2021-04-05 ENCOUNTER — OFFICE VISIT (OUTPATIENT)
Dept: HEMATOLOGY/ONCOLOGY | Facility: CLINIC | Age: 76
End: 2021-04-05
Payer: MEDICARE

## 2021-04-05 VITALS
RESPIRATION RATE: 18 BRPM | WEIGHT: 123.63 LBS | TEMPERATURE: 98 F | HEART RATE: 73 BPM | BODY MASS INDEX: 22.61 KG/M2 | SYSTOLIC BLOOD PRESSURE: 126 MMHG | DIASTOLIC BLOOD PRESSURE: 69 MMHG

## 2021-04-05 DIAGNOSIS — C50.411 MALIGNANT NEOPLASM OF UPPER-OUTER QUADRANT OF RIGHT BREAST IN FEMALE, ESTROGEN RECEPTOR POSITIVE: ICD-10-CM

## 2021-04-05 DIAGNOSIS — I82.621 ACUTE DEEP VEIN THROMBOSIS (DVT) OF RIGHT UPPER EXTREMITY, UNSPECIFIED VEIN: ICD-10-CM

## 2021-04-05 DIAGNOSIS — E11.69 TYPE 2 DIABETES MELLITUS WITH HYPERLIPIDEMIA: Primary | ICD-10-CM

## 2021-04-05 DIAGNOSIS — Z17.0 MALIGNANT NEOPLASM OF UPPER-OUTER QUADRANT OF RIGHT BREAST IN FEMALE, ESTROGEN RECEPTOR POSITIVE: ICD-10-CM

## 2021-04-05 DIAGNOSIS — E78.5 TYPE 2 DIABETES MELLITUS WITH HYPERLIPIDEMIA: Primary | ICD-10-CM

## 2021-04-05 DIAGNOSIS — B35.9 RINGWORM: ICD-10-CM

## 2021-04-05 PROCEDURE — 3074F SYST BP LT 130 MM HG: CPT | Mod: S$GLB,,, | Performed by: INTERNAL MEDICINE

## 2021-04-05 PROCEDURE — 3078F DIAST BP <80 MM HG: CPT | Mod: S$GLB,,, | Performed by: INTERNAL MEDICINE

## 2021-04-05 PROCEDURE — 1126F AMNT PAIN NOTED NONE PRSNT: CPT | Mod: S$GLB,,, | Performed by: INTERNAL MEDICINE

## 2021-04-05 PROCEDURE — 3074F PR MOST RECENT SYSTOLIC BLOOD PRESSURE < 130 MM HG: ICD-10-PCS | Mod: S$GLB,,, | Performed by: INTERNAL MEDICINE

## 2021-04-05 PROCEDURE — 3288F FALL RISK ASSESSMENT DOCD: CPT | Mod: S$GLB,,, | Performed by: INTERNAL MEDICINE

## 2021-04-05 PROCEDURE — 3051F PR MOST RECENT HEMOGLOBIN A1C LEVEL 7.0 - < 8.0%: ICD-10-PCS | Mod: S$GLB,,, | Performed by: INTERNAL MEDICINE

## 2021-04-05 PROCEDURE — 99214 PR OFFICE/OUTPT VISIT, EST, LEVL IV, 30-39 MIN: ICD-10-PCS | Mod: S$GLB,,, | Performed by: INTERNAL MEDICINE

## 2021-04-05 PROCEDURE — 99214 OFFICE O/P EST MOD 30 MIN: CPT | Mod: S$GLB,,, | Performed by: INTERNAL MEDICINE

## 2021-04-05 PROCEDURE — 3288F PR FALLS RISK ASSESSMENT DOCUMENTED: ICD-10-PCS | Mod: S$GLB,,, | Performed by: INTERNAL MEDICINE

## 2021-04-05 PROCEDURE — 1159F MED LIST DOCD IN RCRD: CPT | Mod: S$GLB,,, | Performed by: INTERNAL MEDICINE

## 2021-04-05 PROCEDURE — 1101F PR PT FALLS ASSESS DOC 0-1 FALLS W/OUT INJ PAST YR: ICD-10-PCS | Mod: S$GLB,,, | Performed by: INTERNAL MEDICINE

## 2021-04-05 PROCEDURE — 3078F PR MOST RECENT DIASTOLIC BLOOD PRESSURE < 80 MM HG: ICD-10-PCS | Mod: S$GLB,,, | Performed by: INTERNAL MEDICINE

## 2021-04-05 PROCEDURE — 1101F PT FALLS ASSESS-DOCD LE1/YR: CPT | Mod: S$GLB,,, | Performed by: INTERNAL MEDICINE

## 2021-04-05 PROCEDURE — 1159F PR MEDICATION LIST DOCUMENTED IN MEDICAL RECORD: ICD-10-PCS | Mod: S$GLB,,, | Performed by: INTERNAL MEDICINE

## 2021-04-05 PROCEDURE — 3051F HG A1C>EQUAL 7.0%<8.0%: CPT | Mod: S$GLB,,, | Performed by: INTERNAL MEDICINE

## 2021-04-05 PROCEDURE — 1126F PR PAIN SEVERITY QUANTIFIED, NO PAIN PRESENT: ICD-10-PCS | Mod: S$GLB,,, | Performed by: INTERNAL MEDICINE

## 2021-04-30 DIAGNOSIS — E11.65 TYPE 2 DIABETES MELLITUS WITH HYPERGLYCEMIA, WITHOUT LONG-TERM CURRENT USE OF INSULIN: ICD-10-CM

## 2021-04-30 DIAGNOSIS — I82.621 ACUTE DEEP VEIN THROMBOSIS (DVT) OF RIGHT UPPER EXTREMITY, UNSPECIFIED VEIN: ICD-10-CM

## 2021-04-30 RX ORDER — APIXABAN 5 MG/1
5 TABLET, FILM COATED ORAL 2 TIMES DAILY
Qty: 60 TABLET | Refills: 3 | Status: SHIPPED | OUTPATIENT
Start: 2021-04-30 | End: 2021-01-01 | Stop reason: SDUPTHER

## 2021-05-03 NOTE — ASSESSMENT & PLAN NOTE
This has been working very well for the cancer and her tumor markers were stable in March of this year and will recheck these now.  Will continue the Ibrance and exemestane.     Well positioned.

## 2021-05-05 ENCOUNTER — PATIENT MESSAGE (OUTPATIENT)
Dept: HEMATOLOGY/ONCOLOGY | Facility: CLINIC | Age: 76
End: 2021-05-05

## 2021-05-07 ENCOUNTER — PATIENT MESSAGE (OUTPATIENT)
Dept: HEMATOLOGY/ONCOLOGY | Facility: CLINIC | Age: 76
End: 2021-05-07

## 2021-06-04 ENCOUNTER — TELEPHONE (OUTPATIENT)
Dept: PRIMARY CARE CLINIC | Facility: CLINIC | Age: 76
End: 2021-06-04

## 2021-06-04 DIAGNOSIS — I82.621 ACUTE DEEP VEIN THROMBOSIS (DVT) OF RIGHT UPPER EXTREMITY, UNSPECIFIED VEIN: Primary | ICD-10-CM

## 2021-06-30 ENCOUNTER — TELEPHONE (OUTPATIENT)
Dept: DIABETES | Facility: CLINIC | Age: 76
End: 2021-06-30

## 2021-06-30 ENCOUNTER — TELEPHONE (OUTPATIENT)
Dept: HEMATOLOGY/ONCOLOGY | Facility: CLINIC | Age: 76
End: 2021-06-30

## 2021-06-30 ENCOUNTER — PATIENT MESSAGE (OUTPATIENT)
Dept: PRIMARY CARE CLINIC | Facility: CLINIC | Age: 76
End: 2021-06-30

## 2021-06-30 DIAGNOSIS — E78.5 TYPE 2 DIABETES MELLITUS WITH HYPERLIPIDEMIA: ICD-10-CM

## 2021-06-30 DIAGNOSIS — E11.69 TYPE 2 DIABETES MELLITUS WITH HYPERLIPIDEMIA: ICD-10-CM

## 2021-06-30 DIAGNOSIS — E11.65 TYPE 2 DIABETES MELLITUS WITH HYPERGLYCEMIA, WITHOUT LONG-TERM CURRENT USE OF INSULIN: ICD-10-CM

## 2021-06-30 RX ORDER — INSULIN GLARGINE 100 [IU]/ML
10 INJECTION, SOLUTION SUBCUTANEOUS NIGHTLY
Qty: 1 BOX | Refills: 3 | Status: SHIPPED | OUTPATIENT
Start: 2021-06-30 | End: 2021-01-01

## 2021-06-30 RX ORDER — ATORVASTATIN CALCIUM 20 MG/1
20 TABLET, FILM COATED ORAL DAILY
Qty: 90 TABLET | Refills: 1 | Status: SHIPPED | OUTPATIENT
Start: 2021-06-30 | End: 2021-01-01 | Stop reason: SDUPTHER

## 2021-06-30 RX ORDER — PEN NEEDLE, DIABETIC 30 GX3/16"
NEEDLE, DISPOSABLE MISCELLANEOUS
Qty: 100 EACH | Refills: 3 | Status: SHIPPED | OUTPATIENT
Start: 2021-06-30 | End: 2021-01-01

## 2021-07-01 ENCOUNTER — PATIENT MESSAGE (OUTPATIENT)
Dept: PRIMARY CARE CLINIC | Facility: CLINIC | Age: 76
End: 2021-07-01

## 2021-07-05 ENCOUNTER — HOSPITAL ENCOUNTER (OUTPATIENT)
Facility: HOSPITAL | Age: 76
Discharge: HOME | End: 2021-07-06
Attending: EMERGENCY MEDICINE | Admitting: EMERGENCY MEDICINE
Payer: MEDICARE

## 2021-07-05 DIAGNOSIS — L03.113 RIGHT ARM CELLULITIS: Primary | ICD-10-CM

## 2021-07-05 DIAGNOSIS — M79.631 PAIN AND SWELLING OF RIGHT FOREARM: ICD-10-CM

## 2021-07-05 DIAGNOSIS — M79.89 PAIN AND SWELLING OF RIGHT FOREARM: ICD-10-CM

## 2021-07-05 LAB
ALBUMIN SERPL BCP-MCNC: 3.5 G/DL (ref 3.5–5.2)
ALP SERPL-CCNC: 82 U/L (ref 55–135)
ALT SERPL W/O P-5'-P-CCNC: 12 U/L (ref 10–44)
ANION GAP SERPL CALC-SCNC: 13 MMOL/L (ref 8–16)
AST SERPL-CCNC: 17 U/L (ref 10–40)
BASOPHILS # BLD AUTO: 0.06 K/UL (ref 0–0.2)
BASOPHILS NFR BLD: 0.9 % (ref 0–1.9)
BILIRUB SERPL-MCNC: 0.4 MG/DL (ref 0.1–1)
BUN SERPL-MCNC: 12 MG/DL (ref 8–23)
CALCIUM SERPL-MCNC: 9.8 MG/DL (ref 8.7–10.5)
CHLORIDE SERPL-SCNC: 102 MMOL/L (ref 95–110)
CO2 SERPL-SCNC: 26 MMOL/L (ref 23–29)
CREAT SERPL-MCNC: 0.8 MG/DL (ref 0.5–1.4)
CRP SERPL-MCNC: 60 MG/L (ref 0–8.2)
DIFFERENTIAL METHOD: ABNORMAL
EOSINOPHIL # BLD AUTO: 0.4 K/UL (ref 0–0.5)
EOSINOPHIL NFR BLD: 5.5 % (ref 0–8)
ERYTHROCYTE [DISTWIDTH] IN BLOOD BY AUTOMATED COUNT: 13.4 % (ref 11.5–14.5)
ERYTHROCYTE [SEDIMENTATION RATE] IN BLOOD BY WESTERGREN METHOD: 44 MM/HR (ref 0–36)
EST. GFR  (AFRICAN AMERICAN): >60 ML/MIN/1.73 M^2
EST. GFR  (NON AFRICAN AMERICAN): >60 ML/MIN/1.73 M^2
GLUCOSE SERPL-MCNC: 156 MG/DL (ref 70–110)
HCT VFR BLD AUTO: 35.8 % (ref 37–48.5)
HGB BLD-MCNC: 11.4 G/DL (ref 12–16)
IMM GRANULOCYTES # BLD AUTO: 0.04 K/UL (ref 0–0.04)
IMM GRANULOCYTES NFR BLD AUTO: 0.6 % (ref 0–0.5)
LYMPHOCYTES # BLD AUTO: 1.5 K/UL (ref 1–4.8)
LYMPHOCYTES NFR BLD: 21.7 % (ref 18–48)
MCH RBC QN AUTO: 29.8 PG (ref 27–31)
MCHC RBC AUTO-ENTMCNC: 31.8 G/DL (ref 32–36)
MCV RBC AUTO: 94 FL (ref 82–98)
MONOCYTES # BLD AUTO: 0.8 K/UL (ref 0.3–1)
MONOCYTES NFR BLD: 12.1 % (ref 4–15)
NEUTROPHILS # BLD AUTO: 4.1 K/UL (ref 1.8–7.7)
NEUTROPHILS NFR BLD: 59.2 % (ref 38–73)
NRBC BLD-RTO: 0 /100 WBC
PLATELET # BLD AUTO: 258 K/UL (ref 150–450)
PMV BLD AUTO: 9.6 FL (ref 9.2–12.9)
POTASSIUM SERPL-SCNC: 3.8 MMOL/L (ref 3.5–5.1)
PROT SERPL-MCNC: 7.5 G/DL (ref 6–8.4)
RBC # BLD AUTO: 3.82 M/UL (ref 4–5.4)
SODIUM SERPL-SCNC: 141 MMOL/L (ref 136–145)
WBC # BLD AUTO: 6.86 K/UL (ref 3.9–12.7)

## 2021-07-05 PROCEDURE — 80053 COMPREHEN METABOLIC PANEL: CPT | Performed by: EMERGENCY MEDICINE

## 2021-07-05 PROCEDURE — 25000003 PHARM REV CODE 250: Performed by: EMERGENCY MEDICINE

## 2021-07-05 PROCEDURE — 86140 C-REACTIVE PROTEIN: CPT | Performed by: EMERGENCY MEDICINE

## 2021-07-05 PROCEDURE — 96365 THER/PROPH/DIAG IV INF INIT: CPT

## 2021-07-05 PROCEDURE — 99285 EMERGENCY DEPT VISIT HI MDM: CPT | Mod: 25

## 2021-07-05 PROCEDURE — 99285 PR EMERGENCY DEPT VISIT,LEVEL V: ICD-10-PCS | Mod: ,,, | Performed by: EMERGENCY MEDICINE

## 2021-07-05 PROCEDURE — 85025 COMPLETE CBC W/AUTO DIFF WBC: CPT | Performed by: EMERGENCY MEDICINE

## 2021-07-05 PROCEDURE — 99285 EMERGENCY DEPT VISIT HI MDM: CPT | Mod: ,,, | Performed by: EMERGENCY MEDICINE

## 2021-07-05 PROCEDURE — G0378 HOSPITAL OBSERVATION PER HR: HCPCS

## 2021-07-05 PROCEDURE — 85652 RBC SED RATE AUTOMATED: CPT | Performed by: EMERGENCY MEDICINE

## 2021-07-05 PROCEDURE — 63600175 PHARM REV CODE 636 W HCPCS: Performed by: EMERGENCY MEDICINE

## 2021-07-05 RX ORDER — ALPRAZOLAM 0.25 MG/1
0.25 TABLET ORAL
Status: COMPLETED | OUTPATIENT
Start: 2021-07-05 | End: 2021-07-05

## 2021-07-05 RX ORDER — SODIUM CHLORIDE 0.9 % (FLUSH) 0.9 %
10 SYRINGE (ML) INJECTION
Status: DISCONTINUED | OUTPATIENT
Start: 2021-07-05 | End: 2021-07-06 | Stop reason: HOSPADM

## 2021-07-05 RX ORDER — TALC
6 POWDER (GRAM) TOPICAL NIGHTLY PRN
Status: DISCONTINUED | OUTPATIENT
Start: 2021-07-05 | End: 2021-07-06 | Stop reason: HOSPADM

## 2021-07-05 RX ADMIN — ALPRAZOLAM 0.25 MG: 0.25 TABLET ORAL at 10:07

## 2021-07-05 RX ADMIN — VANCOMYCIN HYDROCHLORIDE 1250 MG: 1.25 INJECTION, POWDER, LYOPHILIZED, FOR SOLUTION INTRAVENOUS at 08:07

## 2021-07-06 VITALS
DIASTOLIC BLOOD PRESSURE: 55 MMHG | BODY MASS INDEX: 23.13 KG/M2 | RESPIRATION RATE: 18 BRPM | WEIGHT: 125.69 LBS | OXYGEN SATURATION: 98 % | TEMPERATURE: 98 F | SYSTOLIC BLOOD PRESSURE: 100 MMHG | HEART RATE: 75 BPM | HEIGHT: 62 IN

## 2021-07-06 PROBLEM — I82.621 ACUTE DEEP VEIN THROMBOSIS (DVT) OF RIGHT UPPER EXTREMITY: Status: RESOLVED | Noted: 2020-09-14 | Resolved: 2021-07-06

## 2021-07-06 PROBLEM — Z79.01 ANTICOAGULATED BY ANTICOAGULATION TREATMENT: Status: ACTIVE | Noted: 2021-07-06

## 2021-07-06 PROBLEM — G30.1 LATE ONSET ALZHEIMER'S DISEASE WITHOUT BEHAVIORAL DISTURBANCE: Status: ACTIVE | Noted: 2019-04-08

## 2021-07-06 LAB
ANION GAP SERPL CALC-SCNC: 8 MMOL/L (ref 8–16)
BASOPHILS # BLD AUTO: 0.05 K/UL (ref 0–0.2)
BASOPHILS NFR BLD: 0.8 % (ref 0–1.9)
BUN SERPL-MCNC: 8 MG/DL (ref 8–23)
CALCIUM SERPL-MCNC: 9.4 MG/DL (ref 8.7–10.5)
CHLORIDE SERPL-SCNC: 105 MMOL/L (ref 95–110)
CO2 SERPL-SCNC: 28 MMOL/L (ref 23–29)
CREAT SERPL-MCNC: 0.7 MG/DL (ref 0.5–1.4)
DIFFERENTIAL METHOD: ABNORMAL
EOSINOPHIL # BLD AUTO: 0.3 K/UL (ref 0–0.5)
EOSINOPHIL NFR BLD: 5 % (ref 0–8)
ERYTHROCYTE [DISTWIDTH] IN BLOOD BY AUTOMATED COUNT: 13.3 % (ref 11.5–14.5)
EST. GFR  (AFRICAN AMERICAN): >60 ML/MIN/1.73 M^2
EST. GFR  (NON AFRICAN AMERICAN): >60 ML/MIN/1.73 M^2
ESTIMATED AVG GLUCOSE: 189 MG/DL (ref 68–131)
GLUCOSE SERPL-MCNC: 103 MG/DL (ref 70–110)
HBA1C MFR BLD: 8.2 % (ref 4–5.6)
HCT VFR BLD AUTO: 36.3 % (ref 37–48.5)
HGB BLD-MCNC: 11.9 G/DL (ref 12–16)
IMM GRANULOCYTES # BLD AUTO: 0.05 K/UL (ref 0–0.04)
IMM GRANULOCYTES NFR BLD AUTO: 0.8 % (ref 0–0.5)
LYMPHOCYTES # BLD AUTO: 1 K/UL (ref 1–4.8)
LYMPHOCYTES NFR BLD: 15.7 % (ref 18–48)
MAGNESIUM SERPL-MCNC: 1.8 MG/DL (ref 1.6–2.6)
MCH RBC QN AUTO: 30.2 PG (ref 27–31)
MCHC RBC AUTO-ENTMCNC: 32.8 G/DL (ref 32–36)
MCV RBC AUTO: 92 FL (ref 82–98)
MONOCYTES # BLD AUTO: 0.6 K/UL (ref 0.3–1)
MONOCYTES NFR BLD: 10.1 % (ref 4–15)
NEUTROPHILS # BLD AUTO: 4.2 K/UL (ref 1.8–7.7)
NEUTROPHILS NFR BLD: 67.6 % (ref 38–73)
NRBC BLD-RTO: 0 /100 WBC
PHOSPHATE SERPL-MCNC: 3.6 MG/DL (ref 2.7–4.5)
PLATELET # BLD AUTO: 251 K/UL (ref 150–450)
PMV BLD AUTO: 9.1 FL (ref 9.2–12.9)
POCT GLUCOSE: 106 MG/DL (ref 70–110)
POCT GLUCOSE: 193 MG/DL (ref 70–110)
POTASSIUM SERPL-SCNC: 3.9 MMOL/L (ref 3.5–5.1)
RBC # BLD AUTO: 3.94 M/UL (ref 4–5.4)
SODIUM SERPL-SCNC: 141 MMOL/L (ref 136–145)
WBC # BLD AUTO: 6.25 K/UL (ref 3.9–12.7)

## 2021-07-06 PROCEDURE — 99217 PR OBSERVATION CARE DISCHARGE: CPT | Mod: ,,, | Performed by: STUDENT IN AN ORGANIZED HEALTH CARE EDUCATION/TRAINING PROGRAM

## 2021-07-06 PROCEDURE — 83735 ASSAY OF MAGNESIUM: CPT | Performed by: HOSPITALIST

## 2021-07-06 PROCEDURE — 99220 PR INITIAL OBSERVATION CARE,LEVL III: ICD-10-PCS | Mod: ,,, | Performed by: HOSPITALIST

## 2021-07-06 PROCEDURE — 83036 HEMOGLOBIN GLYCOSYLATED A1C: CPT | Performed by: HOSPITALIST

## 2021-07-06 PROCEDURE — G0378 HOSPITAL OBSERVATION PER HR: HCPCS

## 2021-07-06 PROCEDURE — 85025 COMPLETE CBC W/AUTO DIFF WBC: CPT | Performed by: HOSPITALIST

## 2021-07-06 PROCEDURE — 99220 PR INITIAL OBSERVATION CARE,LEVL III: CPT | Mod: ,,, | Performed by: HOSPITALIST

## 2021-07-06 PROCEDURE — 25000003 PHARM REV CODE 250: Performed by: HOSPITALIST

## 2021-07-06 PROCEDURE — 99217 PR OBSERVATION CARE DISCHARGE: ICD-10-PCS | Mod: ,,, | Performed by: STUDENT IN AN ORGANIZED HEALTH CARE EDUCATION/TRAINING PROGRAM

## 2021-07-06 PROCEDURE — 84100 ASSAY OF PHOSPHORUS: CPT | Performed by: HOSPITALIST

## 2021-07-06 PROCEDURE — 36415 COLL VENOUS BLD VENIPUNCTURE: CPT | Performed by: HOSPITALIST

## 2021-07-06 PROCEDURE — 25000003 PHARM REV CODE 250: Performed by: STUDENT IN AN ORGANIZED HEALTH CARE EDUCATION/TRAINING PROGRAM

## 2021-07-06 PROCEDURE — 80048 BASIC METABOLIC PNL TOTAL CA: CPT | Performed by: HOSPITALIST

## 2021-07-06 RX ORDER — L. ACIDOPHILUS/L.BULGARICUS 100MM CELL
1 GRANULES IN PACKET (EA) ORAL 2 TIMES DAILY
Qty: 14 PACKET | Refills: 0 | Status: SHIPPED | OUTPATIENT
Start: 2021-07-06 | End: 2021-07-13

## 2021-07-06 RX ORDER — ACETAMINOPHEN 325 MG/1
650 TABLET ORAL EVERY 6 HOURS PRN
Status: DISCONTINUED | OUTPATIENT
Start: 2021-07-06 | End: 2021-07-06 | Stop reason: HOSPADM

## 2021-07-06 RX ORDER — SERTRALINE HYDROCHLORIDE 50 MG/1
50 TABLET, FILM COATED ORAL 2 TIMES DAILY
Status: DISCONTINUED | OUTPATIENT
Start: 2021-07-06 | End: 2021-07-06 | Stop reason: HOSPADM

## 2021-07-06 RX ORDER — ATORVASTATIN CALCIUM 20 MG/1
20 TABLET, FILM COATED ORAL DAILY
Status: DISCONTINUED | OUTPATIENT
Start: 2021-07-06 | End: 2021-07-06 | Stop reason: HOSPADM

## 2021-07-06 RX ORDER — INSULIN ASPART 100 [IU]/ML
0-5 INJECTION, SOLUTION INTRAVENOUS; SUBCUTANEOUS
Status: DISCONTINUED | OUTPATIENT
Start: 2021-07-06 | End: 2021-07-06 | Stop reason: HOSPADM

## 2021-07-06 RX ORDER — GLUCAGON 1 MG
1 KIT INJECTION
Status: DISCONTINUED | OUTPATIENT
Start: 2021-07-06 | End: 2021-07-06 | Stop reason: HOSPADM

## 2021-07-06 RX ORDER — IBUPROFEN 200 MG
16 TABLET ORAL
Status: DISCONTINUED | OUTPATIENT
Start: 2021-07-06 | End: 2021-07-06 | Stop reason: HOSPADM

## 2021-07-06 RX ORDER — POLYETHYLENE GLYCOL 3350 17 G/17G
17 POWDER, FOR SOLUTION ORAL 2 TIMES DAILY PRN
Status: DISCONTINUED | OUTPATIENT
Start: 2021-07-06 | End: 2021-07-06 | Stop reason: HOSPADM

## 2021-07-06 RX ORDER — MEMANTINE HYDROCHLORIDE 10 MG/1
10 TABLET ORAL 2 TIMES DAILY
Status: DISCONTINUED | OUTPATIENT
Start: 2021-07-06 | End: 2021-07-06

## 2021-07-06 RX ORDER — ALPRAZOLAM 0.25 MG/1
0.25 TABLET ORAL 3 TIMES DAILY PRN
Status: DISCONTINUED | OUTPATIENT
Start: 2021-07-06 | End: 2021-07-06 | Stop reason: HOSPADM

## 2021-07-06 RX ORDER — CEPHALEXIN 500 MG/1
500 CAPSULE ORAL EVERY 12 HOURS
Qty: 14 CAPSULE | Refills: 0 | Status: SHIPPED | OUTPATIENT
Start: 2021-07-06 | End: 2021-07-13

## 2021-07-06 RX ORDER — L. ACIDOPHILUS/L.BULGARICUS 100MM CELL
1 GRANULES IN PACKET (EA) ORAL 2 TIMES DAILY
Status: DISCONTINUED | OUTPATIENT
Start: 2021-07-06 | End: 2021-07-06 | Stop reason: HOSPADM

## 2021-07-06 RX ORDER — DOCUSATE SODIUM 100 MG/1
100 CAPSULE, LIQUID FILLED ORAL 2 TIMES DAILY
Status: DISCONTINUED | OUTPATIENT
Start: 2021-07-06 | End: 2021-07-06 | Stop reason: HOSPADM

## 2021-07-06 RX ORDER — HYDROCODONE BITARTRATE AND ACETAMINOPHEN 5; 325 MG/1; MG/1
1 TABLET ORAL EVERY 6 HOURS PRN
Status: DISCONTINUED | OUTPATIENT
Start: 2021-07-06 | End: 2021-07-06 | Stop reason: HOSPADM

## 2021-07-06 RX ORDER — CEPHALEXIN 250 MG/1
500 CAPSULE ORAL
Status: DISCONTINUED | OUTPATIENT
Start: 2021-07-06 | End: 2021-07-06 | Stop reason: HOSPADM

## 2021-07-06 RX ORDER — IBUPROFEN 200 MG
24 TABLET ORAL
Status: DISCONTINUED | OUTPATIENT
Start: 2021-07-06 | End: 2021-07-06 | Stop reason: HOSPADM

## 2021-07-06 RX ORDER — DONEPEZIL HYDROCHLORIDE 10 MG/1
10 TABLET, FILM COATED ORAL NIGHTLY
Status: DISCONTINUED | OUTPATIENT
Start: 2021-07-06 | End: 2021-07-06 | Stop reason: HOSPADM

## 2021-07-06 RX ADMIN — LACTOBACILLUS ACIDOPHILUS / LACTOBACILLUS BULGARICUS 1 EACH: 100 MILLION CFU STRENGTH GRANULES at 01:07

## 2021-07-06 RX ADMIN — ALPRAZOLAM 0.25 MG: 0.25 TABLET ORAL at 01:07

## 2021-07-06 RX ADMIN — ATORVASTATIN CALCIUM 20 MG: 20 TABLET, FILM COATED ORAL at 08:07

## 2021-07-06 RX ADMIN — CEPHALEXIN 500 MG: 250 CAPSULE ORAL at 01:07

## 2021-07-06 RX ADMIN — APIXABAN 5 MG: 5 TABLET, FILM COATED ORAL at 08:07

## 2021-07-06 RX ADMIN — DOCUSATE SODIUM 100 MG: 100 CAPSULE, LIQUID FILLED ORAL at 08:07

## 2021-07-06 RX ADMIN — SERTRALINE HYDROCHLORIDE 50 MG: 50 TABLET ORAL at 08:07

## 2021-07-07 ENCOUNTER — OFFICE VISIT (OUTPATIENT)
Dept: HEMATOLOGY/ONCOLOGY | Facility: CLINIC | Age: 76
End: 2021-07-07
Payer: MEDICARE

## 2021-07-07 VITALS
SYSTOLIC BLOOD PRESSURE: 141 MMHG | TEMPERATURE: 98 F | HEART RATE: 84 BPM | WEIGHT: 124.31 LBS | DIASTOLIC BLOOD PRESSURE: 82 MMHG | BODY MASS INDEX: 22.73 KG/M2 | RESPIRATION RATE: 18 BRPM

## 2021-07-07 DIAGNOSIS — C50.411 MALIGNANT NEOPLASM OF UPPER-OUTER QUADRANT OF RIGHT BREAST IN FEMALE, ESTROGEN RECEPTOR POSITIVE: ICD-10-CM

## 2021-07-07 DIAGNOSIS — M25.421 SWELLING OF JOINT OF UPPER ARM, RIGHT: ICD-10-CM

## 2021-07-07 DIAGNOSIS — Z17.0 MALIGNANT NEOPLASM OF UPPER-OUTER QUADRANT OF RIGHT BREAST IN FEMALE, ESTROGEN RECEPTOR POSITIVE: ICD-10-CM

## 2021-07-07 PROCEDURE — 1125F PR PAIN SEVERITY QUANTIFIED, PAIN PRESENT: ICD-10-PCS | Mod: S$GLB,,, | Performed by: INTERNAL MEDICINE

## 2021-07-07 PROCEDURE — 1159F PR MEDICATION LIST DOCUMENTED IN MEDICAL RECORD: ICD-10-PCS | Mod: S$GLB,,, | Performed by: INTERNAL MEDICINE

## 2021-07-07 PROCEDURE — 99214 OFFICE O/P EST MOD 30 MIN: CPT | Mod: S$GLB,,, | Performed by: INTERNAL MEDICINE

## 2021-07-07 PROCEDURE — 99214 PR OFFICE/OUTPT VISIT, EST, LEVL IV, 30-39 MIN: ICD-10-PCS | Mod: S$GLB,,, | Performed by: INTERNAL MEDICINE

## 2021-07-07 PROCEDURE — 1125F AMNT PAIN NOTED PAIN PRSNT: CPT | Mod: S$GLB,,, | Performed by: INTERNAL MEDICINE

## 2021-07-07 PROCEDURE — 1159F MED LIST DOCD IN RCRD: CPT | Mod: S$GLB,,, | Performed by: INTERNAL MEDICINE

## 2021-07-07 PROCEDURE — 1101F PR PT FALLS ASSESS DOC 0-1 FALLS W/OUT INJ PAST YR: ICD-10-PCS | Mod: S$GLB,,, | Performed by: INTERNAL MEDICINE

## 2021-07-07 PROCEDURE — 1101F PT FALLS ASSESS-DOCD LE1/YR: CPT | Mod: S$GLB,,, | Performed by: INTERNAL MEDICINE

## 2021-07-07 PROCEDURE — 3288F FALL RISK ASSESSMENT DOCD: CPT | Mod: S$GLB,,, | Performed by: INTERNAL MEDICINE

## 2021-07-07 PROCEDURE — 3288F PR FALLS RISK ASSESSMENT DOCUMENTED: ICD-10-PCS | Mod: S$GLB,,, | Performed by: INTERNAL MEDICINE

## 2021-07-09 ENCOUNTER — TELEPHONE (OUTPATIENT)
Dept: HEMATOLOGY/ONCOLOGY | Facility: CLINIC | Age: 76
End: 2021-07-09

## 2021-07-14 ENCOUNTER — HOSPITAL ENCOUNTER (OUTPATIENT)
Dept: RADIOLOGY | Facility: HOSPITAL | Age: 76
Discharge: HOME | End: 2021-07-14
Attending: INTERNAL MEDICINE
Payer: MEDICARE

## 2021-07-14 VITALS — WEIGHT: 112 LBS | HEIGHT: 62 IN | BODY MASS INDEX: 20.61 KG/M2

## 2021-07-14 DIAGNOSIS — Z17.0 MALIGNANT NEOPLASM OF UPPER-OUTER QUADRANT OF RIGHT BREAST IN FEMALE, ESTROGEN RECEPTOR POSITIVE: ICD-10-CM

## 2021-07-14 DIAGNOSIS — C50.411 MALIGNANT NEOPLASM OF UPPER-OUTER QUADRANT OF RIGHT BREAST IN FEMALE, ESTROGEN RECEPTOR POSITIVE: ICD-10-CM

## 2021-07-14 DIAGNOSIS — M25.421 SWELLING OF JOINT OF UPPER ARM, RIGHT: ICD-10-CM

## 2021-07-14 LAB — GLUCOSE SERPL-MCNC: 120 MG/DL (ref 70–110)

## 2021-07-14 PROCEDURE — 78815 PET IMAGE W/CT SKULL-THIGH: CPT | Mod: PS,TC,PO

## 2021-07-15 ENCOUNTER — OFFICE VISIT (OUTPATIENT)
Dept: HEMATOLOGY/ONCOLOGY | Facility: CLINIC | Age: 76
End: 2021-07-15
Payer: MEDICARE

## 2021-07-15 VITALS
BODY MASS INDEX: 22.53 KG/M2 | DIASTOLIC BLOOD PRESSURE: 68 MMHG | SYSTOLIC BLOOD PRESSURE: 118 MMHG | WEIGHT: 123.19 LBS | TEMPERATURE: 98 F | HEART RATE: 74 BPM | RESPIRATION RATE: 17 BRPM

## 2021-07-15 DIAGNOSIS — Z17.0 MALIGNANT NEOPLASM OF UPPER-OUTER QUADRANT OF RIGHT BREAST IN FEMALE, ESTROGEN RECEPTOR POSITIVE: ICD-10-CM

## 2021-07-15 DIAGNOSIS — C50.411 MALIGNANT NEOPLASM OF UPPER-OUTER QUADRANT OF RIGHT BREAST IN FEMALE, ESTROGEN RECEPTOR POSITIVE: ICD-10-CM

## 2021-07-15 PROCEDURE — 1101F PT FALLS ASSESS-DOCD LE1/YR: CPT | Mod: S$GLB,,, | Performed by: INTERNAL MEDICINE

## 2021-07-15 PROCEDURE — 1126F PR PAIN SEVERITY QUANTIFIED, NO PAIN PRESENT: ICD-10-PCS | Mod: S$GLB,,, | Performed by: INTERNAL MEDICINE

## 2021-07-15 PROCEDURE — 99214 OFFICE O/P EST MOD 30 MIN: CPT | Mod: S$GLB,,, | Performed by: INTERNAL MEDICINE

## 2021-07-15 PROCEDURE — 99214 PR OFFICE/OUTPT VISIT, EST, LEVL IV, 30-39 MIN: ICD-10-PCS | Mod: S$GLB,,, | Performed by: INTERNAL MEDICINE

## 2021-07-15 PROCEDURE — 3288F PR FALLS RISK ASSESSMENT DOCUMENTED: ICD-10-PCS | Mod: S$GLB,,, | Performed by: INTERNAL MEDICINE

## 2021-07-15 PROCEDURE — 3288F FALL RISK ASSESSMENT DOCD: CPT | Mod: S$GLB,,, | Performed by: INTERNAL MEDICINE

## 2021-07-15 PROCEDURE — 1159F MED LIST DOCD IN RCRD: CPT | Mod: S$GLB,,, | Performed by: INTERNAL MEDICINE

## 2021-07-15 PROCEDURE — 1159F PR MEDICATION LIST DOCUMENTED IN MEDICAL RECORD: ICD-10-PCS | Mod: S$GLB,,, | Performed by: INTERNAL MEDICINE

## 2021-07-15 PROCEDURE — 1126F AMNT PAIN NOTED NONE PRSNT: CPT | Mod: S$GLB,,, | Performed by: INTERNAL MEDICINE

## 2021-07-15 PROCEDURE — 1101F PR PT FALLS ASSESS DOC 0-1 FALLS W/OUT INJ PAST YR: ICD-10-PCS | Mod: S$GLB,,, | Performed by: INTERNAL MEDICINE

## 2021-07-19 ENCOUNTER — SOCIAL WORK (OUTPATIENT)
Dept: HEMATOLOGY/ONCOLOGY | Facility: CLINIC | Age: 76
End: 2021-07-19

## 2021-07-19 ENCOUNTER — OFFICE VISIT (OUTPATIENT)
Dept: RADIATION ONCOLOGY | Facility: CLINIC | Age: 76
End: 2021-07-19
Payer: MEDICARE

## 2021-07-19 VITALS
WEIGHT: 122.38 LBS | DIASTOLIC BLOOD PRESSURE: 71 MMHG | SYSTOLIC BLOOD PRESSURE: 138 MMHG | HEART RATE: 72 BPM | BODY MASS INDEX: 22.39 KG/M2 | TEMPERATURE: 98 F | OXYGEN SATURATION: 99 %

## 2021-07-19 DIAGNOSIS — C50.911 RECURRENT MALIGNANT NEOPLASM OF RIGHT BREAST: Primary | Chronic | ICD-10-CM

## 2021-07-19 PROCEDURE — 1126F AMNT PAIN NOTED NONE PRSNT: CPT | Mod: S$GLB,,, | Performed by: RADIOLOGY

## 2021-07-19 PROCEDURE — 1101F PT FALLS ASSESS-DOCD LE1/YR: CPT | Mod: S$GLB,,, | Performed by: RADIOLOGY

## 2021-07-19 PROCEDURE — 1159F MED LIST DOCD IN RCRD: CPT | Mod: S$GLB,,, | Performed by: RADIOLOGY

## 2021-07-19 PROCEDURE — 99205 OFFICE O/P NEW HI 60 MIN: CPT | Mod: S$GLB,,, | Performed by: RADIOLOGY

## 2021-07-19 PROCEDURE — 1126F PR PAIN SEVERITY QUANTIFIED, NO PAIN PRESENT: ICD-10-PCS | Mod: S$GLB,,, | Performed by: RADIOLOGY

## 2021-07-19 PROCEDURE — 1159F PR MEDICATION LIST DOCUMENTED IN MEDICAL RECORD: ICD-10-PCS | Mod: S$GLB,,, | Performed by: RADIOLOGY

## 2021-07-19 PROCEDURE — 1101F PR PT FALLS ASSESS DOC 0-1 FALLS W/OUT INJ PAST YR: ICD-10-PCS | Mod: S$GLB,,, | Performed by: RADIOLOGY

## 2021-07-19 PROCEDURE — 3288F PR FALLS RISK ASSESSMENT DOCUMENTED: ICD-10-PCS | Mod: S$GLB,,, | Performed by: RADIOLOGY

## 2021-07-19 PROCEDURE — 99205 PR OFFICE/OUTPT VISIT, NEW, LEVL V, 60-74 MIN: ICD-10-PCS | Mod: S$GLB,,, | Performed by: RADIOLOGY

## 2021-07-19 PROCEDURE — 3288F FALL RISK ASSESSMENT DOCD: CPT | Mod: S$GLB,,, | Performed by: RADIOLOGY

## 2021-07-21 ENCOUNTER — TELEPHONE (OUTPATIENT)
Dept: HEMATOLOGY/ONCOLOGY | Facility: CLINIC | Age: 76
End: 2021-07-21

## 2021-07-23 ENCOUNTER — TELEPHONE (OUTPATIENT)
Dept: PRIMARY CARE CLINIC | Facility: CLINIC | Age: 76
End: 2021-07-23

## 2021-07-23 DIAGNOSIS — R30.0 DYSURIA: Primary | ICD-10-CM

## 2021-11-23 PROBLEM — E11.9 TYPE 2 DIABETES MELLITUS WITHOUT COMPLICATION, WITHOUT LONG-TERM CURRENT USE OF INSULIN: Status: ACTIVE | Noted: 2020-06-24

## 2021-12-07 PROBLEM — L03.113 RIGHT ARM CELLULITIS: Status: RESOLVED | Noted: 2021-07-05 | Resolved: 2021-01-01

## 2021-12-07 PROBLEM — Z66 DNR (DO NOT RESUSCITATE): Status: ACTIVE | Noted: 2021-01-01

## 2021-12-07 PROBLEM — M25.421 SWELLING OF JOINT OF UPPER ARM, RIGHT: Status: RESOLVED | Noted: 2021-07-07 | Resolved: 2021-01-01

## 2022-01-01 ENCOUNTER — HOSPITAL ENCOUNTER (OUTPATIENT)
Dept: RADIOLOGY | Facility: HOSPITAL | Age: 77
Discharge: HOME | End: 2022-02-14
Attending: NURSE PRACTITIONER
Payer: MEDICARE

## 2022-01-01 ENCOUNTER — TELEPHONE (OUTPATIENT)
Dept: HEMATOLOGY/ONCOLOGY | Facility: CLINIC | Age: 77
End: 2022-01-01
Payer: MEDICARE

## 2022-01-01 ENCOUNTER — OFFICE VISIT (OUTPATIENT)
Dept: HEMATOLOGY/ONCOLOGY | Facility: CLINIC | Age: 77
End: 2022-01-01
Payer: MEDICARE

## 2022-01-01 VITALS
RESPIRATION RATE: 18 BRPM | SYSTOLIC BLOOD PRESSURE: 114 MMHG | BODY MASS INDEX: 20.98 KG/M2 | WEIGHT: 114 LBS | HEART RATE: 73 BPM | TEMPERATURE: 98 F | DIASTOLIC BLOOD PRESSURE: 63 MMHG | HEIGHT: 62 IN

## 2022-01-01 VITALS — HEIGHT: 62 IN | WEIGHT: 110 LBS | BODY MASS INDEX: 20.24 KG/M2

## 2022-01-01 VITALS
SYSTOLIC BLOOD PRESSURE: 124 MMHG | WEIGHT: 117 LBS | DIASTOLIC BLOOD PRESSURE: 59 MMHG | BODY MASS INDEX: 21.53 KG/M2 | HEART RATE: 79 BPM | RESPIRATION RATE: 18 BRPM | TEMPERATURE: 98 F | HEIGHT: 62 IN

## 2022-01-01 DIAGNOSIS — Z17.0 MALIGNANT NEOPLASM OF UPPER-OUTER QUADRANT OF RIGHT BREAST IN FEMALE, ESTROGEN RECEPTOR POSITIVE: ICD-10-CM

## 2022-01-01 DIAGNOSIS — R42 DIZZINESS: ICD-10-CM

## 2022-01-01 DIAGNOSIS — Z17.0 MALIGNANT NEOPLASM OF UPPER-OUTER QUADRANT OF RIGHT BREAST IN FEMALE, ESTROGEN RECEPTOR POSITIVE: Primary | ICD-10-CM

## 2022-01-01 DIAGNOSIS — G30.1 LATE ONSET ALZHEIMER'S DISEASE WITHOUT BEHAVIORAL DISTURBANCE: ICD-10-CM

## 2022-01-01 DIAGNOSIS — F02.80 LATE ONSET ALZHEIMER'S DISEASE WITHOUT BEHAVIORAL DISTURBANCE: ICD-10-CM

## 2022-01-01 DIAGNOSIS — E11.9 TYPE 2 DIABETES MELLITUS WITHOUT COMPLICATION, WITHOUT LONG-TERM CURRENT USE OF INSULIN: ICD-10-CM

## 2022-01-01 DIAGNOSIS — C50.911 RECURRENT MALIGNANT NEOPLASM OF RIGHT BREAST: Chronic | ICD-10-CM

## 2022-01-01 DIAGNOSIS — C50.411 MALIGNANT NEOPLASM OF UPPER-OUTER QUADRANT OF RIGHT BREAST IN FEMALE, ESTROGEN RECEPTOR POSITIVE: Primary | ICD-10-CM

## 2022-01-01 DIAGNOSIS — C50.411 MALIGNANT NEOPLASM OF UPPER-OUTER QUADRANT OF RIGHT BREAST IN FEMALE, ESTROGEN RECEPTOR POSITIVE: ICD-10-CM

## 2022-01-01 DIAGNOSIS — I82.621 ACUTE DEEP VEIN THROMBOSIS (DVT) OF RIGHT UPPER EXTREMITY, UNSPECIFIED VEIN: ICD-10-CM

## 2022-01-01 DIAGNOSIS — C50.911 RECURRENT MALIGNANT NEOPLASM OF RIGHT BREAST: ICD-10-CM

## 2022-01-01 LAB — GLUCOSE SERPL-MCNC: 102 MG/DL (ref 70–110)

## 2022-01-01 PROCEDURE — 1160F RVW MEDS BY RX/DR IN RCRD: CPT | Mod: S$GLB,,, | Performed by: INTERNAL MEDICINE

## 2022-01-01 PROCEDURE — 70553 MRI BRAIN STEM W/O & W/DYE: CPT | Mod: TC,PO

## 2022-01-01 PROCEDURE — 1159F PR MEDICATION LIST DOCUMENTED IN MEDICAL RECORD: ICD-10-PCS | Mod: S$GLB,,, | Performed by: NURSE PRACTITIONER

## 2022-01-01 PROCEDURE — 1159F PR MEDICATION LIST DOCUMENTED IN MEDICAL RECORD: ICD-10-PCS | Mod: S$GLB,,, | Performed by: INTERNAL MEDICINE

## 2022-01-01 PROCEDURE — 3078F DIAST BP <80 MM HG: CPT | Mod: S$GLB,,, | Performed by: INTERNAL MEDICINE

## 2022-01-01 PROCEDURE — 1160F RVW MEDS BY RX/DR IN RCRD: CPT | Mod: S$GLB,,, | Performed by: NURSE PRACTITIONER

## 2022-01-01 PROCEDURE — 1126F PR PAIN SEVERITY QUANTIFIED, NO PAIN PRESENT: ICD-10-PCS | Mod: S$GLB,,, | Performed by: NURSE PRACTITIONER

## 2022-01-01 PROCEDURE — 1101F PT FALLS ASSESS-DOCD LE1/YR: CPT | Mod: S$GLB,,, | Performed by: NURSE PRACTITIONER

## 2022-01-01 PROCEDURE — 1125F PR PAIN SEVERITY QUANTIFIED, PAIN PRESENT: ICD-10-PCS | Mod: S$GLB,,, | Performed by: INTERNAL MEDICINE

## 2022-01-01 PROCEDURE — 99214 OFFICE O/P EST MOD 30 MIN: CPT | Mod: S$GLB,,, | Performed by: NURSE PRACTITIONER

## 2022-01-01 PROCEDURE — 3078F DIAST BP <80 MM HG: CPT | Mod: S$GLB,,, | Performed by: NURSE PRACTITIONER

## 2022-01-01 PROCEDURE — 1100F PTFALLS ASSESS-DOCD GE2>/YR: CPT | Mod: S$GLB,,, | Performed by: INTERNAL MEDICINE

## 2022-01-01 PROCEDURE — 99214 PR OFFICE/OUTPT VISIT, EST, LEVL IV, 30-39 MIN: ICD-10-PCS | Mod: S$GLB,,, | Performed by: NURSE PRACTITIONER

## 2022-01-01 PROCEDURE — A9585 GADOBUTROL INJECTION: HCPCS | Mod: PO | Performed by: NURSE PRACTITIONER

## 2022-01-01 PROCEDURE — 99213 PR OFFICE/OUTPT VISIT, EST, LEVL III, 20-29 MIN: ICD-10-PCS | Mod: S$GLB,,, | Performed by: INTERNAL MEDICINE

## 2022-01-01 PROCEDURE — 3074F PR MOST RECENT SYSTOLIC BLOOD PRESSURE < 130 MM HG: ICD-10-PCS | Mod: S$GLB,,, | Performed by: INTERNAL MEDICINE

## 2022-01-01 PROCEDURE — 99213 OFFICE O/P EST LOW 20 MIN: CPT | Mod: S$GLB,,, | Performed by: INTERNAL MEDICINE

## 2022-01-01 PROCEDURE — 3288F PR FALLS RISK ASSESSMENT DOCUMENTED: ICD-10-PCS | Mod: S$GLB,,, | Performed by: INTERNAL MEDICINE

## 2022-01-01 PROCEDURE — 3074F SYST BP LT 130 MM HG: CPT | Mod: S$GLB,,, | Performed by: NURSE PRACTITIONER

## 2022-01-01 PROCEDURE — 3078F PR MOST RECENT DIASTOLIC BLOOD PRESSURE < 80 MM HG: ICD-10-PCS | Mod: S$GLB,,, | Performed by: NURSE PRACTITIONER

## 2022-01-01 PROCEDURE — 3288F PR FALLS RISK ASSESSMENT DOCUMENTED: ICD-10-PCS | Mod: S$GLB,,, | Performed by: NURSE PRACTITIONER

## 2022-01-01 PROCEDURE — 1159F MED LIST DOCD IN RCRD: CPT | Mod: S$GLB,,, | Performed by: NURSE PRACTITIONER

## 2022-01-01 PROCEDURE — 25500020 PHARM REV CODE 255: Mod: PO | Performed by: NURSE PRACTITIONER

## 2022-01-01 PROCEDURE — 3074F SYST BP LT 130 MM HG: CPT | Mod: S$GLB,,, | Performed by: INTERNAL MEDICINE

## 2022-01-01 PROCEDURE — 3078F PR MOST RECENT DIASTOLIC BLOOD PRESSURE < 80 MM HG: ICD-10-PCS | Mod: S$GLB,,, | Performed by: INTERNAL MEDICINE

## 2022-01-01 PROCEDURE — 1160F PR REVIEW ALL MEDS BY PRESCRIBER/CLIN PHARMACIST DOCUMENTED: ICD-10-PCS | Mod: S$GLB,,, | Performed by: NURSE PRACTITIONER

## 2022-01-01 PROCEDURE — 1125F AMNT PAIN NOTED PAIN PRSNT: CPT | Mod: S$GLB,,, | Performed by: INTERNAL MEDICINE

## 2022-01-01 PROCEDURE — 1160F PR REVIEW ALL MEDS BY PRESCRIBER/CLIN PHARMACIST DOCUMENTED: ICD-10-PCS | Mod: S$GLB,,, | Performed by: INTERNAL MEDICINE

## 2022-01-01 PROCEDURE — 1101F PR PT FALLS ASSESS DOC 0-1 FALLS W/OUT INJ PAST YR: ICD-10-PCS | Mod: S$GLB,,, | Performed by: NURSE PRACTITIONER

## 2022-01-01 PROCEDURE — 3288F FALL RISK ASSESSMENT DOCD: CPT | Mod: S$GLB,,, | Performed by: NURSE PRACTITIONER

## 2022-01-01 PROCEDURE — 1159F MED LIST DOCD IN RCRD: CPT | Mod: S$GLB,,, | Performed by: INTERNAL MEDICINE

## 2022-01-01 PROCEDURE — 1100F PR PT FALLS ASSESS DOC 2+ FALLS/FALL W/INJURY/YR: ICD-10-PCS | Mod: S$GLB,,, | Performed by: INTERNAL MEDICINE

## 2022-01-01 PROCEDURE — 3288F FALL RISK ASSESSMENT DOCD: CPT | Mod: S$GLB,,, | Performed by: INTERNAL MEDICINE

## 2022-01-01 PROCEDURE — 1126F AMNT PAIN NOTED NONE PRSNT: CPT | Mod: S$GLB,,, | Performed by: NURSE PRACTITIONER

## 2022-01-01 PROCEDURE — 3074F PR MOST RECENT SYSTOLIC BLOOD PRESSURE < 130 MM HG: ICD-10-PCS | Mod: S$GLB,,, | Performed by: NURSE PRACTITIONER

## 2022-01-01 PROCEDURE — 78815 PET IMAGE W/CT SKULL-THIGH: CPT | Mod: TC,PO

## 2022-01-01 RX ORDER — GADOBUTROL 604.72 MG/ML
5.5 INJECTION INTRAVENOUS
Status: COMPLETED | OUTPATIENT
Start: 2022-01-01 | End: 2022-01-01

## 2022-01-01 RX ORDER — APIXABAN 5 MG/1
5 TABLET, FILM COATED ORAL 2 TIMES DAILY
Qty: 60 TABLET | Refills: 5 | Status: SHIPPED | OUTPATIENT
Start: 2022-01-01

## 2022-01-01 RX ORDER — HYDROCODONE BITARTRATE AND ACETAMINOPHEN 7.5; 325 MG/1; MG/1
1 TABLET ORAL EVERY 6 HOURS PRN
Qty: 60 TABLET | Refills: 0 | Status: SHIPPED | OUTPATIENT
Start: 2022-01-01

## 2022-01-01 RX ADMIN — GADOBUTROL 5.5 ML: 604.72 INJECTION INTRAVENOUS at 11:02

## 2022-01-27 NOTE — PROGRESS NOTES
PROGRESS NOTE    Subjective:       Patient ID: Melissa Sood is a 76 y.o. female.    Breast Cancer:  Dx: 2003  T2N1M0 stage II  neoadj AC-T, surgery-XRT Arimidex-Femara till 2/2011  BRCA 1/2 neg  ER pos, Her 2 neg  Recurrence 3/2018  Started Ibrance/Exemestane 4/30/2018-5/1/2020.  Piqray 5/15/2020- 6/7/2020(held due to high glu)    Affinitor 10mg daily?    Chief Complaint:  breast cancer follow up.      History of Present Illness:   Melissa Sood is a 76 y.o. female who presents for follow up.      Patient is here for follow up of Met breast cancer. She has been having periods of dizziness for the past 2 week. She is eating and drinking well. She has confusion due to Alzheimers. Some nausea. BP is running ok during these spells. HR was 69. Will check BS with next dizzy spell. She has not had any imaging since 7/14/2021 and she continues to be off of any chemotherapy.    7/14/2021:  PET with enlarged right SC nodes and a single MS node suspicious for metastatic disease.  Stable diffuse sclerotic osseous metastases.      Continues on Eliquis 5mg bid as of today, 7/7/2021    RUE DVT Dx 9/9/2020.  Started on Eliquis 5mg bid. Continues on as of today, 2/1/2021 and tolerating well.      Patient now on 150mg of piqray and continues as of today, 7/7/2021 and is tolerating this better.         PIK3CA--path 4/2/2018    Labs 6/24/2019:  WBC 2.5(ANC 1.3)  Hb 11.6  Plt: 110--228  CMP unremark  CA 15-3:  42.9--44--55  CA 27.29: 62.5--68--69    Family and Social history reviewed and is unchanged from 9/22/2014      ROS:  Review of Systems   Constitutional: Negative for appetite change, fever and unexpected weight change.   Eyes: Negative for visual disturbance.   Respiratory: Negative for cough and shortness of breath.    Cardiovascular: Negative for chest pain and leg swelling.   Gastrointestinal: Negative for abdominal pain, blood in stool and diarrhea.   Genitourinary:  "Negative for frequency and hematuria.   Musculoskeletal: Negative for back pain.   Skin: Negative for rash.   Neurological: Positive for dizziness. Negative for headaches.   Hematological: Negative for adenopathy.   Psychiatric/Behavioral: The patient is not nervous/anxious.           Current Outpatient Medications:     ALPRAZolam (XANAX) 0.25 MG tablet, TAKE 1 TABLET BY MOUTH THREE TIMES DAILY AS NEEDED FOR ANXIETY, Disp: 30 tablet, Rfl: 0    atorvastatin (LIPITOR) 20 MG tablet, Take 1 tablet (20 mg total) by mouth once daily., Disp: 90 tablet, Rfl: 1    donepezil (ARICEPT) 10 MG tablet, Take 1 tablet by mouth every evening., Disp: , Rfl:     ELIQUIS 5 mg Tab, Take 1 tablet (5 mg total) by mouth 2 (two) times a day., Disp: 60 tablet, Rfl: 5    escitalopram oxalate (LEXAPRO) 20 MG tablet, TAKE 1 TABLET BY MOUTH EVERY DAY, Disp: 90 tablet, Rfl: 0    exemestane (AROMASIN) 25 mg tablet, TAKE 1 TABLET BY MOUTH EVERY DAY, Disp: 30 tablet, Rfl: 11    memantine (NAMENDA XR) 28 mg CSpX, memantine 28 mg capsule sprinkle,extended release 24hr  TAKE 1 CAPSULE BY MOUTH EVERY DAY, Disp: , Rfl:     sertraline (ZOLOFT) 50 MG tablet, sertraline 50 mg tablet  TK 1 T PO QD FOR 2 WKS THEN TK 1 T PO BID, Disp: , Rfl:     SITagliptan-metformin (JANUMET) 50-1,000 mg per tablet, Take 1 tablet by mouth 2 (two) times daily with meals., Disp: 180 tablet, Rfl: 1    docusate sodium (COLACE) 100 MG capsule, Take 1 capsule (100 mg total) by mouth 2 (two) times daily. (Patient not taking: Reported on 1/27/2022), Disp: 60 capsule, Rfl: 0        Objective:       Physical Examination:     BP (!) 124/59   Pulse 79   Temp 97.6 °F (36.4 °C)   Resp 18   Ht 5' 2" (1.575 m)   Wt 53.1 kg (117 lb)   BMI 21.40 kg/m²     Physical Exam  Constitutional:       General: She is not in acute distress.     Appearance: Normal appearance. She is well-developed and normal weight.   HENT:      Head: Normocephalic and atraumatic.      Right Ear: External " ear normal.      Left Ear: External ear normal.   Eyes:      Conjunctiva/sclera: Conjunctivae normal.      Pupils: Pupils are equal, round, and reactive to light.   Neck:      Thyroid: No thyromegaly.      Trachea: No tracheal deviation.     Cardiovascular:      Rate and Rhythm: Normal rate and regular rhythm.      Heart sounds: Normal heart sounds.   Pulmonary:      Effort: Pulmonary effort is normal.      Breath sounds: Normal breath sounds.   Abdominal:      General: Bowel sounds are normal. There is no distension.      Palpations: Abdomen is soft. There is no mass.      Tenderness: There is no abdominal tenderness.   Musculoskeletal:         General: Normal range of motion.      Cervical back: Normal range of motion.   Skin:     General: Skin is warm and dry.      Findings: No rash.   Neurological:      General: No focal deficit present.      Mental Status: She is alert.      Comments: Patient has intermittent confusion due to Alzheimers   Psychiatric:         Mood and Affect: Mood normal.         Behavior: Behavior normal.         Thought Content: Thought content normal.         Judgment: Judgment normal.         Labs:   Recent Results (from the past 336 hour(s))   CBC w/ DIFF    Collection Time: 01/25/22  2:25 PM   Result Value Ref Range    WBC 6.51 3.90 - 12.70 K/uL    Hemoglobin 11.8 (L) 12.0 - 16.0 g/dL    Hematocrit 37.3 37.0 - 48.5 %    Platelets 225 150 - 450 K/uL     CMP  Sodium   Date Value Ref Range Status   01/25/2022 143 136 - 145 mmol/L Final   06/24/2019 141 134 - 144 mmol/L      Potassium   Date Value Ref Range Status   01/25/2022 4.0 3.5 - 5.1 mmol/L Final     Chloride   Date Value Ref Range Status   01/25/2022 101 95 - 110 mmol/L Final   06/24/2019 103 98 - 110 mmol/L      CO2   Date Value Ref Range Status   01/25/2022 28 23 - 29 mmol/L Final     Glucose   Date Value Ref Range Status   01/25/2022 122 (H) 70 - 110 mg/dL Final   06/24/2019 87 70 - 99 mg/dL      BUN   Date Value Ref Range Status    01/25/2022 18 8 - 23 mg/dL Final     Creatinine   Date Value Ref Range Status   01/25/2022 0.9 0.5 - 1.4 mg/dL Final   06/24/2019 0.82 0.60 - 1.40 mg/dL      Calcium   Date Value Ref Range Status   01/25/2022 9.5 8.7 - 10.5 mg/dL Final     Total Protein   Date Value Ref Range Status   01/25/2022 7.6 6.0 - 8.4 g/dL Final     Albumin   Date Value Ref Range Status   01/25/2022 4.3 3.5 - 5.2 g/dL Final   06/24/2019 4.3 3.1 - 4.7 g/dL      Total Bilirubin   Date Value Ref Range Status   01/25/2022 0.3 0.1 - 1.0 mg/dL Final     Comment:     For infants and newborns, interpretation of results should be based  on gestational age, weight and in agreement with clinical  observations.    Premature Infant recommended reference ranges:  Up to 24 hours.............<8.0 mg/dL  Up to 48 hours............<12.0 mg/dL  3-5 days..................<15.0 mg/dL  6-29 days.................<15.0 mg/dL       Alkaline Phosphatase   Date Value Ref Range Status   01/25/2022 70 55 - 135 U/L Final     AST   Date Value Ref Range Status   01/25/2022 26 10 - 40 U/L Final     ALT   Date Value Ref Range Status   01/25/2022 22 10 - 44 U/L Final     Anion Gap   Date Value Ref Range Status   01/25/2022 14 8 - 16 mmol/L Final     eGFR if    Date Value Ref Range Status   01/25/2022 >60.0 >60 mL/min/1.73 m^2 Final     eGFR if non    Date Value Ref Range Status   01/25/2022 >60.0 >60 mL/min/1.73 m^2 Final     Comment:     Calculation used to obtain the estimated glomerular filtration  rate (eGFR) is the CKD-EPI equation.        Lab Results   Component Value Date    CEA 6.3 (H) 04/01/2021             Assessment/Plan:     Problem List Items Addressed This Visit        Neuro    Late onset Alzheimer's disease without behavioral disturbance       Oncology    Recurrent malignant neoplasm of right breast (Chronic)    Relevant Orders    MRI BRAIN W WO CONTRAST    NM PET CT Routine Skull to Mid Thigh    Malignant neoplasm of  upper-outer quadrant of right breast in female, estrogen receptor positive - Primary    Relevant Orders    MRI BRAIN W WO CONTRAST    NM PET CT Routine Skull to Mid Thigh       Endocrine    Type 2 diabetes mellitus without complication, without long-term current use of insulin      Other Visit Diagnoses     Dizziness        Relevant Orders    MRI BRAIN W WO CONTRAST    NM PET CT Routine Skull to Mid Thigh        1. Met breast Cancer- Get up to date Pet scan  2. Dizziness- MRI brain  3. Type 2 diabetes- Cotninue to check BS  4. Alzheimers- Continue to follow up with PCP      Discussion:   Follow up in about 11 days (around 2/7/2022) for with Dr. Barton.      Electronically signed by Claudia Awad, MSN, APRN, AGNP-C, OCN

## 2022-02-07 NOTE — TELEPHONE ENCOUNTER
----- Message from Vicky Edge sent at 2/7/2022 11:22 AM CST -----  Contact: Maya with [facility] phone [phone]  Requesting an RX refill or new RX.  Is this a refill or new RX: Refill  RX name and strength (copy/paste from chart):  ELIQUIS 5 mg Tab  Is this a 30 day or 90 day RX: 30  Pharmacy name and phone # (copy/paste from chart):    [facility] - [address]  [address]  [place] [address]  Phone: [phone] Fax: [phone]  The doctors have asked that we provide their patients with the following 2 reminders -- prescription refills can take up to 72 hours, and a friendly reminder that in the future you can use your [facility] account to request refills: N/A

## 2022-02-17 NOTE — PROGRESS NOTES
PROGRESS NOTE    Subjective:       Patient ID: Melissa Sood is a 76 y.o. female.    Breast Cancer:  Dx: 2003  T2N1M0 stage II  neoadj AC-T, surgery-XRT Arimidex-Femara till 2/2011  BRCA 1/2 neg  ER pos, Her 2 neg  Recurrence 3/2018  Started Ibrance/Exemestane 4/30/2018-5/1/2020.  Piqray 5/15/2020- 6/7/2020(held due to high glu)    Affinitor 10mg daily?    Chief Complaint:  No chief complaint on file.  breast cancer follow up.      History of Present Illness:   Melissa Sood is a 76 y.o. female who presents for follow up.      Ms. Sood is dong ok.  She is off all cancer therapy and doing ok with this.  in memory care now.      7/14/2021:  PET with enlarged right SC nodes and a single MS node suspicious for metastatic disease.  Stable diffuse sclerotic osseous metastases.      Continues on Eliquis 5mg bid as of today, 7/7/2021    RUE DVT Dx 9/9/2020.  Started on Eliquis 5mg bid. Continues on as of today, 2/1/2021 and tolerating well.      Patient now on 150mg of piqray and continues as of today, 7/7/2021 and is tolerating this better.         PIK3CA--path 4/2/2018        Labs 6/24/2019:  WBC 2.5(ANC 1.3)  Hb 11.6  Plt: 110--228  CMP unremark  CA 15-3:  42.9--44--55  CA 27.29: 62.5--68--69        Family and Social history reviewed and is unchanged from 9/22/2014      ROS:  Review of Systems   Constitutional: Negative for appetite change, fever and unexpected weight change.   HENT: Negative for mouth sores.    Eyes: Negative for visual disturbance.   Respiratory: Negative for cough and shortness of breath.    Cardiovascular: Negative for chest pain and leg swelling.   Gastrointestinal: Positive for diarrhea. Negative for abdominal pain and blood in stool.   Genitourinary: Negative for frequency and hematuria.   Musculoskeletal: Negative for back pain.   Skin: Negative for rash.   Neurological: Negative for headaches.   Hematological: Negative for adenopathy.  "  Psychiatric/Behavioral: The patient is not nervous/anxious.           Current Outpatient Medications:     ALPRAZolam (XANAX) 0.25 MG tablet, TAKE 1 TABLET BY MOUTH THREE TIMES DAILY AS NEEDED FOR ANXIETY, Disp: 30 tablet, Rfl: 0    atorvastatin (LIPITOR) 20 MG tablet, Take 1 tablet (20 mg total) by mouth once daily., Disp: 90 tablet, Rfl: 1    docusate sodium (COLACE) 100 MG capsule, Take 1 capsule (100 mg total) by mouth 2 (two) times daily., Disp: 60 capsule, Rfl: 0    donepezil (ARICEPT) 10 MG tablet, Take 1 tablet by mouth every evening., Disp: , Rfl:     ELIQUIS 5 mg Tab, Take 1 tablet (5 mg total) by mouth 2 (two) times a day., Disp: 60 tablet, Rfl: 5    escitalopram oxalate (LEXAPRO) 20 MG tablet, TAKE 1 TABLET BY MOUTH EVERY DAY, Disp: 90 tablet, Rfl: 0    exemestane (AROMASIN) 25 mg tablet, TAKE 1 TABLET BY MOUTH EVERY DAY, Disp: 30 tablet, Rfl: 11    memantine (NAMENDA XR) 28 mg CSpX, memantine 28 mg capsule sprinkle,extended release 24hr  TAKE 1 CAPSULE BY MOUTH EVERY DAY, Disp: , Rfl:     sertraline (ZOLOFT) 50 MG tablet, sertraline 50 mg tablet  TK 1 T PO QD FOR 2 WKS THEN TK 1 T PO BID, Disp: , Rfl:     SITagliptan-metformin (JANUMET) 50-1,000 mg per tablet, Take 1 tablet by mouth 2 (two) times daily with meals., Disp: 180 tablet, Rfl: 1        Objective:       Physical Examination:     /63   Pulse 73   Temp 97.7 °F (36.5 °C)   Resp 18   Ht 5' 2" (1.575 m)   Wt 51.7 kg (114 lb)   BMI 20.85 kg/m²     Physical Exam  Constitutional:       Appearance: She is well-developed.   HENT:      Head: Normocephalic and atraumatic.      Right Ear: External ear normal.      Left Ear: External ear normal.   Eyes:      Conjunctiva/sclera: Conjunctivae normal.      Pupils: Pupils are equal, round, and reactive to light.   Neck:      Thyroid: No thyromegaly.      Trachea: No tracheal deviation.     Cardiovascular:      Rate and Rhythm: Normal rate and regular rhythm.      Heart sounds: Normal " heart sounds.   Pulmonary:      Effort: Pulmonary effort is normal.      Breath sounds: Normal breath sounds.   Abdominal:      General: Bowel sounds are normal. There is no distension.      Palpations: Abdomen is soft. There is no mass.      Tenderness: There is no abdominal tenderness.   Skin:     Findings: No rash.   Neurological:      Comments: Neuro intact througout   Psychiatric:         Behavior: Behavior normal.         Thought Content: Thought content normal.         Judgment: Judgment normal.         Labs:   No results found for this or any previous visit (from the past 336 hour(s)).  CMP  Sodium   Date Value Ref Range Status   01/25/2022 143 136 - 145 mmol/L Final   06/24/2019 141 134 - 144 mmol/L      Potassium   Date Value Ref Range Status   01/25/2022 4.0 3.5 - 5.1 mmol/L Final     Chloride   Date Value Ref Range Status   01/25/2022 101 95 - 110 mmol/L Final   06/24/2019 103 98 - 110 mmol/L      CO2   Date Value Ref Range Status   01/25/2022 28 23 - 29 mmol/L Final     Glucose   Date Value Ref Range Status   01/25/2022 122 (H) 70 - 110 mg/dL Final   06/24/2019 87 70 - 99 mg/dL      BUN   Date Value Ref Range Status   01/25/2022 18 8 - 23 mg/dL Final     Creatinine   Date Value Ref Range Status   01/25/2022 0.9 0.5 - 1.4 mg/dL Final   06/24/2019 0.82 0.60 - 1.40 mg/dL      Calcium   Date Value Ref Range Status   01/25/2022 9.5 8.7 - 10.5 mg/dL Final     Total Protein   Date Value Ref Range Status   01/25/2022 7.6 6.0 - 8.4 g/dL Final     Albumin   Date Value Ref Range Status   01/25/2022 4.3 3.5 - 5.2 g/dL Final   06/24/2019 4.3 3.1 - 4.7 g/dL      Total Bilirubin   Date Value Ref Range Status   01/25/2022 0.3 0.1 - 1.0 mg/dL Final     Comment:     For infants and newborns, interpretation of results should be based  on gestational age, weight and in agreement with clinical  observations.    Premature Infant recommended reference ranges:  Up to 24 hours.............<8.0 mg/dL  Up to 48  hours............<12.0 mg/dL  3-5 days..................<15.0 mg/dL  6-29 days.................<15.0 mg/dL       Alkaline Phosphatase   Date Value Ref Range Status   01/25/2022 70 55 - 135 U/L Final     AST   Date Value Ref Range Status   01/25/2022 26 10 - 40 U/L Final     ALT   Date Value Ref Range Status   01/25/2022 22 10 - 44 U/L Final     Anion Gap   Date Value Ref Range Status   01/25/2022 14 8 - 16 mmol/L Final     eGFR if    Date Value Ref Range Status   01/25/2022 >60.0 >60 mL/min/1.73 m^2 Final     eGFR if non    Date Value Ref Range Status   01/25/2022 >60.0 >60 mL/min/1.73 m^2 Final     Comment:     Calculation used to obtain the estimated glomerular filtration  rate (eGFR) is the CKD-EPI equation.        Lab Results   Component Value Date    CEA 6.3 (H) 04/01/2021     No results found for: PSA        Assessment/Plan:     Problem List Items Addressed This Visit     Recurrent malignant neoplasm of right breast (Chronic)     I discussed with them today that I think that it is time for hospice.  We have decided that no further therapy is needed and this would be of great help with them.  Will contact [facility] to ask them who they are marquis with.  I will have her back with me as needed.                 Discussion:   High complexity, high risk medications.   Follow up if symptoms worsen or fail to improve.      Electronically signed by Rudolph Bryan

## 2022-02-17 NOTE — ASSESSMENT & PLAN NOTE
I discussed with them today that I think that it is time for hospice.  We have decided that no further therapy is needed and this would be of great help with them.  Will contact sangita to ask them who they are marquis with.  I will have her back with me as needed.

## 2022-08-21 ENCOUNTER — PATIENT MESSAGE (OUTPATIENT)
Dept: ADMINISTRATIVE | Facility: HOSPITAL | Age: 77
End: 2022-08-21
Payer: MEDICARE